# Patient Record
Sex: MALE | Race: BLACK OR AFRICAN AMERICAN | NOT HISPANIC OR LATINO | Employment: UNEMPLOYED | URBAN - METROPOLITAN AREA
[De-identification: names, ages, dates, MRNs, and addresses within clinical notes are randomized per-mention and may not be internally consistent; named-entity substitution may affect disease eponyms.]

---

## 2017-07-28 ENCOUNTER — TRANSFERRED RECORDS (OUTPATIENT)
Dept: HEALTH INFORMATION MANAGEMENT | Facility: CLINIC | Age: 52
End: 2017-07-28

## 2017-08-18 ENCOUNTER — TRANSFERRED RECORDS (OUTPATIENT)
Dept: HEALTH INFORMATION MANAGEMENT | Facility: CLINIC | Age: 52
End: 2017-08-18

## 2018-07-12 ENCOUNTER — TRANSFERRED RECORDS (OUTPATIENT)
Dept: HEALTH INFORMATION MANAGEMENT | Facility: CLINIC | Age: 53
End: 2018-07-12

## 2018-08-14 ENCOUNTER — MEDICAL CORRESPONDENCE (OUTPATIENT)
Dept: HEALTH INFORMATION MANAGEMENT | Facility: CLINIC | Age: 53
End: 2018-08-14

## 2019-02-01 ENCOUNTER — HOSPITAL ENCOUNTER (EMERGENCY)
Facility: CLINIC | Age: 54
Discharge: HOME | End: 2019-02-01
Attending: EMERGENCY MEDICINE | Admitting: EMERGENCY MEDICINE

## 2019-02-01 ENCOUNTER — APPOINTMENT (OUTPATIENT)
Dept: GENERAL RADIOLOGY | Facility: CLINIC | Age: 54
End: 2019-02-01

## 2019-02-01 VITALS
TEMPERATURE: 98.8 F | RESPIRATION RATE: 18 BRPM | OXYGEN SATURATION: 96 % | BODY MASS INDEX: 34.07 KG/M2 | HEIGHT: 69 IN | SYSTOLIC BLOOD PRESSURE: 127 MMHG | DIASTOLIC BLOOD PRESSURE: 78 MMHG | HEART RATE: 85 BPM | WEIGHT: 230 LBS

## 2019-02-01 DIAGNOSIS — R50.9 FEBRILE ILLNESS, ACUTE: ICD-10-CM

## 2019-02-01 DIAGNOSIS — M54.50 BILATERAL LOW BACK PAIN WITHOUT SCIATICA, UNSPECIFIED CHRONICITY: ICD-10-CM

## 2019-02-01 DIAGNOSIS — R51.9 ACUTE NONINTRACTABLE HEADACHE, UNSPECIFIED HEADACHE TYPE: ICD-10-CM

## 2019-02-01 DIAGNOSIS — B34.9 NONSPECIFIC SYNDROME SUGGESTIVE OF VIRAL ILLNESS: ICD-10-CM

## 2019-02-01 LAB
ALBUMIN UR-MCNC: 30 MG/DL
ANION GAP SERPL CALCULATED.3IONS-SCNC: 3 MMOL/L (ref 3–14)
APPEARANCE UR: CLEAR
BASOPHILS # BLD AUTO: 0 10E9/L (ref 0–0.2)
BASOPHILS NFR BLD AUTO: 0.3 %
BILIRUB UR QL STRIP: NEGATIVE
BUN SERPL-MCNC: 9 MG/DL (ref 7–30)
CALCIUM SERPL-MCNC: 8.1 MG/DL (ref 8.5–10.1)
CHLORIDE SERPL-SCNC: 104 MMOL/L (ref 94–109)
CO2 SERPL-SCNC: 29 MMOL/L (ref 20–32)
COLOR UR AUTO: YELLOW
CREAT SERPL-MCNC: 1.08 MG/DL (ref 0.66–1.25)
DIFFERENTIAL METHOD BLD: ABNORMAL
EOSINOPHIL # BLD AUTO: 0 10E9/L (ref 0–0.7)
EOSINOPHIL NFR BLD AUTO: 0 %
ERYTHROCYTE [DISTWIDTH] IN BLOOD BY AUTOMATED COUNT: 11.9 % (ref 10–15)
FLUAV+FLUBV AG SPEC QL: NEGATIVE
FLUAV+FLUBV AG SPEC QL: NEGATIVE
GFR SERPL CREATININE-BSD FRML MDRD: 77 ML/MIN/{1.73_M2}
GLUCOSE SERPL-MCNC: 108 MG/DL (ref 70–99)
GLUCOSE UR STRIP-MCNC: NEGATIVE MG/DL
HCT VFR BLD AUTO: 34.8 % (ref 40–53)
HGB BLD-MCNC: 11.1 G/DL (ref 13.3–17.7)
HGB UR QL STRIP: NEGATIVE
IMM GRANULOCYTES # BLD: 0 10E9/L (ref 0–0.4)
IMM GRANULOCYTES NFR BLD: 0.6 %
KETONES UR STRIP-MCNC: NEGATIVE MG/DL
LACTATE BLD-SCNC: 0.8 MMOL/L (ref 0.7–2)
LEUKOCYTE ESTERASE UR QL STRIP: NEGATIVE
LYMPHOCYTES # BLD AUTO: 0.6 10E9/L (ref 0.8–5.3)
LYMPHOCYTES NFR BLD AUTO: 17.2 %
MCH RBC QN AUTO: 29.1 PG (ref 26.5–33)
MCHC RBC AUTO-ENTMCNC: 31.9 G/DL (ref 31.5–36.5)
MCV RBC AUTO: 91 FL (ref 78–100)
MONOCYTES # BLD AUTO: 0.2 10E9/L (ref 0–1.3)
MONOCYTES NFR BLD AUTO: 6.3 %
MUCOUS THREADS #/AREA URNS LPF: PRESENT /LPF
NEUTROPHILS # BLD AUTO: 2.6 10E9/L (ref 1.6–8.3)
NEUTROPHILS NFR BLD AUTO: 75.6 %
NITRATE UR QL: NEGATIVE
NRBC # BLD AUTO: 0 10*3/UL
NRBC BLD AUTO-RTO: 0 /100
PH UR STRIP: 8 PH (ref 5–7)
PLATELET # BLD AUTO: 201 10E9/L (ref 150–450)
POTASSIUM SERPL-SCNC: 5.3 MMOL/L (ref 3.4–5.3)
RBC # BLD AUTO: 3.82 10E12/L (ref 4.4–5.9)
RBC #/AREA URNS AUTO: 7 /HPF (ref 0–2)
SODIUM SERPL-SCNC: 136 MMOL/L (ref 133–144)
SOURCE: ABNORMAL
SP GR UR STRIP: 1.02 (ref 1–1.03)
SPECIMEN SOURCE: NORMAL
UROBILINOGEN UR STRIP-MCNC: 4 MG/DL (ref 0–2)
WBC # BLD AUTO: 3.5 10E9/L (ref 4–11)
WBC #/AREA URNS AUTO: <1 /HPF (ref 0–5)

## 2019-02-01 PROCEDURE — 83605 ASSAY OF LACTIC ACID: CPT | Performed by: EMERGENCY MEDICINE

## 2019-02-01 PROCEDURE — 71046 X-RAY EXAM CHEST 2 VIEWS: CPT

## 2019-02-01 PROCEDURE — 85025 COMPLETE CBC W/AUTO DIFF WBC: CPT | Performed by: EMERGENCY MEDICINE

## 2019-02-01 PROCEDURE — 87804 INFLUENZA ASSAY W/OPTIC: CPT | Performed by: EMERGENCY MEDICINE

## 2019-02-01 PROCEDURE — 36415 COLL VENOUS BLD VENIPUNCTURE: CPT

## 2019-02-01 PROCEDURE — 87040 BLOOD CULTURE FOR BACTERIA: CPT | Performed by: EMERGENCY MEDICINE

## 2019-02-01 PROCEDURE — 25000128 H RX IP 250 OP 636: Performed by: EMERGENCY MEDICINE

## 2019-02-01 PROCEDURE — 80048 BASIC METABOLIC PNL TOTAL CA: CPT | Performed by: EMERGENCY MEDICINE

## 2019-02-01 PROCEDURE — 96374 THER/PROPH/DIAG INJ IV PUSH: CPT

## 2019-02-01 PROCEDURE — 81001 URINALYSIS AUTO W/SCOPE: CPT | Performed by: EMERGENCY MEDICINE

## 2019-02-01 PROCEDURE — 96375 TX/PRO/DX INJ NEW DRUG ADDON: CPT

## 2019-02-01 PROCEDURE — 96361 HYDRATE IV INFUSION ADD-ON: CPT

## 2019-02-01 PROCEDURE — 25000132 ZZH RX MED GY IP 250 OP 250 PS 637: Performed by: EMERGENCY MEDICINE

## 2019-02-01 PROCEDURE — 99284 EMERGENCY DEPT VISIT MOD MDM: CPT | Mod: 25

## 2019-02-01 RX ORDER — METOCLOPRAMIDE HYDROCHLORIDE 5 MG/ML
5 INJECTION INTRAMUSCULAR; INTRAVENOUS ONCE
Status: COMPLETED | OUTPATIENT
Start: 2019-02-01 | End: 2019-02-01

## 2019-02-01 RX ORDER — ACETAMINOPHEN 500 MG
1000 TABLET ORAL ONCE
Status: COMPLETED | OUTPATIENT
Start: 2019-02-01 | End: 2019-02-01

## 2019-02-01 RX ORDER — ONDANSETRON 4 MG/1
4 TABLET, ORALLY DISINTEGRATING ORAL EVERY 8 HOURS PRN
Qty: 10 TABLET | Refills: 0 | Status: SHIPPED | OUTPATIENT
Start: 2019-02-01 | End: 2019-03-03

## 2019-02-01 RX ORDER — ONDANSETRON 2 MG/ML
4 INJECTION INTRAMUSCULAR; INTRAVENOUS ONCE
Status: COMPLETED | OUTPATIENT
Start: 2019-02-01 | End: 2019-02-01

## 2019-02-01 RX ORDER — TRAMADOL HYDROCHLORIDE 50 MG/1
50 TABLET ORAL EVERY 6 HOURS PRN
COMMUNITY
End: 2021-04-07

## 2019-02-01 RX ORDER — DIPHENHYDRAMINE HYDROCHLORIDE 50 MG/ML
25 INJECTION INTRAMUSCULAR; INTRAVENOUS ONCE
Status: COMPLETED | OUTPATIENT
Start: 2019-02-01 | End: 2019-02-01

## 2019-02-01 RX ORDER — TRAZODONE HYDROCHLORIDE 50 MG/1
300 TABLET, FILM COATED ORAL AT BEDTIME
COMMUNITY
End: 2023-01-09

## 2019-02-01 RX ORDER — LIDOCAINE 4 G/G
1 PATCH TOPICAL ONCE
Status: DISCONTINUED | OUTPATIENT
Start: 2019-02-01 | End: 2019-02-01 | Stop reason: HOSPADM

## 2019-02-01 RX ORDER — KETOROLAC TROMETHAMINE 15 MG/ML
15 INJECTION, SOLUTION INTRAMUSCULAR; INTRAVENOUS ONCE
Status: COMPLETED | OUTPATIENT
Start: 2019-02-01 | End: 2019-02-01

## 2019-02-01 RX ADMIN — DIPHENHYDRAMINE HYDROCHLORIDE 25 MG: 50 INJECTION INTRAMUSCULAR; INTRAVENOUS at 11:29

## 2019-02-01 RX ADMIN — ONDANSETRON 4 MG: 2 INJECTION INTRAMUSCULAR; INTRAVENOUS at 10:05

## 2019-02-01 RX ADMIN — ACETAMINOPHEN 1000 MG: 500 TABLET, FILM COATED ORAL at 10:03

## 2019-02-01 RX ADMIN — SODIUM CHLORIDE 1000 ML: 9 INJECTION, SOLUTION INTRAVENOUS at 10:02

## 2019-02-01 RX ADMIN — KETOROLAC TROMETHAMINE 15 MG: 15 INJECTION, SOLUTION INTRAMUSCULAR; INTRAVENOUS at 10:05

## 2019-02-01 RX ADMIN — SODIUM CHLORIDE 1000 ML: 9 INJECTION, SOLUTION INTRAVENOUS at 11:27

## 2019-02-01 RX ADMIN — METOCLOPRAMIDE 5 MG: 5 INJECTION, SOLUTION INTRAMUSCULAR; INTRAVENOUS at 11:31

## 2019-02-01 RX ADMIN — LIDOCAINE 1 PATCH: 560 PATCH PERCUTANEOUS; TOPICAL; TRANSDERMAL at 10:50

## 2019-02-01 SDOH — HEALTH STABILITY: MENTAL HEALTH: HOW OFTEN DO YOU HAVE A DRINK CONTAINING ALCOHOL?: NEVER

## 2019-02-01 ASSESSMENT — ENCOUNTER SYMPTOMS
NAUSEA: 1
DYSURIA: 0
HEADACHES: 1
COUGH: 1
FEVER: 1
VOMITING: 1
CONSTIPATION: 0
DIARRHEA: 0
HEMATURIA: 0
BACK PAIN: 1

## 2019-02-01 ASSESSMENT — MIFFLIN-ST. JEOR: SCORE: 1873.65

## 2019-02-01 NOTE — DISCHARGE INSTRUCTIONS
Rest and drink lots of fluids.  Use Tylenol or ibuprofen as needed for pain and fever.  Purchase patches over the counter for back pain.  Use Zofran as needed for nausea and vomiting.  Return if you are having severe back pain, numbness or tingling, weakness, difficulty urinating, severe headache or neck pain, or ANY OTHER CONCERNING SYMPTOMS.  Otherwise, follow up with primary care next week to ensure you are improving as expected.

## 2019-02-01 NOTE — ED PROVIDER NOTES
"  History     Chief Complaint:  Back Pain     The history is provided by the patient.     Babar Bhatt is a 53 year old male with a history of hypertension who presents for evaluation of \"a bad cold or something.\" He has had frontal headache, nonproductive cough, now resolved sore throat, anorexia, nausea with a single episode of emesis, and lower abdominal pain that is now resolved over the last 3 days. He has chronic bilateral lower back pain and presents today because this pain became severe in the last 24 hours - a change from baseline. It does not radiate to his legs. He has tried Tylenol and aspirin for pain last night without relief. He has no history of malignancy or IV drug use. Patient denies diarrhea, constipation, dysuria, or hematuria. He does have right sided neck soreness. 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 He did not get an influenza vaccination this year. Patient has fever here, but did not note one at home.    Allergies:  NKDA    Medications:    Tramadol  Trazodone    Past Medical History:    HTN    Past Surgical History:    The patient does not have any pertinent past surgical history.    Family History:    No past pertinent family history.    Social History:  Marital Status:  Single   Friend at bedside.  Tobacco use: Smokes 0.50 PPD  Negative for alcohol use.     Review of Systems   Constitutional: Positive for appetite change and fever.   HENT: Negative for sore throat (resolved).    Respiratory: Positive for cough.    Gastrointestinal: Positive for nausea and vomiting. Negative for abdominal pain (resolved), constipation and diarrhea.   Genitourinary: Negative for dysuria and hematuria.   Musculoskeletal: Positive for back pain and neck pain (right neck sore).   Neurological: Positive for headaches.   All other systems reviewed and are negative.    Physical Exam     Patient Vitals for the past 24 hrs:   BP Temp Temp src Pulse Resp SpO2 " "Height Weight   02/01/19 1200 133/79 98.8  F (37.1  C) Oral 86 -- 96 % -- --   02/01/19 1130 136/82 -- -- -- -- -- -- --   02/01/19 1100 -- 101  F (38.3  C) Oral -- -- -- -- --   02/01/19 0840 140/77 102.6  F (39.2  C) Oral 94 18 97 % 1.753 m (5' 9\") 104.3 kg (230 lb)     Physical Exam  General: Well-developed and well-nourished. Well appearing middle aged  man. Cooperative.  Head:  Atraumatic.  Eyes:  Conjunctivae, lids, and sclerae are normal.  ENT:    Normal nose. Moist mucous membranes. Normal posterior oropharynx.  Neck:  Supple. Normal range of motion. No nuchal rigidity and able to easily touch chin to chest.  CV:  Regular rate and rhythm. Normal heart sounds with no murmurs, rubs, or gallops detected.  Resp:  No respiratory distress. Rare coarse rhonchi left base without decreased breath sounds, wheezing, or rales.  GI:  Soft. Non-distended. Non-tender. No love CVA tenderness.    MS:  Normal ROM. No bilateral lower extremity edema. Mild tenderness bilateral lumbar paraspinal musculature.  Skin:  Warm. Non-diaphoretic. No pallor.  Neuro: Awake. A&Ox3. Normal strength including 5/5 strength with plantarflexion and dorsiflexion. Sensation intact to light touch throughout including perineum.  Psych:  Normal mood and affect. Normal speech.  Vitals reviewed.    Emergency Department Course   Imaging:  Radiographic findings were communicated with the patient who voiced understanding of the findings.  XR Chest 2 views:   PA and lateral views of the chest. Lungs are clear. Heart  is normal in size. No effusions are evident. No pneumothorax.  Degenerative lower thoracic spine changes are noted, as per radiology.     Laboratory:  Influenza A/B: Negative  CBC: WBC: 3.5 (L0, HGB: 11.1 (L), PLT: 201  BMP: Glucose 108 (H), Ca: 8.1 (L0, o/w WNL (Creatinine: 1.08)    1000 Lactic acid: 0.8  UA with Microscopic: pH: 8.0 (H), Albumin: 30 (A), Urobilinogen: 4.0 (H), RBC: 7 (H), Mucous: Present (a), o/w " "WNL    Blood cultures x2: Pending    Interventions:  1002 NS 1L IV  1003 Tylenol, 1000 mg, PO  1005 Toradol, 15 mg, IV injection   Zofran, 4 mg, IV injection  1050 Lidocaine patch, 1 patch, Transdermal  1127 NS 1L IV  1129 Benadryl 25 mg IV injection  1131 Reglan 5mg IV injection    Emergency Department Course:  Nursing notes and vitals reviewed.   (0922) I performed an exam of the patient as documented above.      IV inserted. Medicine administered as documented above. Blood drawn. This was sent to the lab for further testing, results above.     The patient was sent for a chest x-ray while in the emergency department, findings above.      The patient provided a urine sample here in the emergency department. This was sent for laboratory testing, findings above.     (1120) I rechecked the patient and discussed the results of his workup thus far. His fever has resolved and his back pain is much better. His headache is still present. He reports that he has been getting cold-like symptoms at the end of every week for the last several months, however today's was much worse than it has been. His friend remarks he does not sleep well and believes cold symptoms at the end of each week are because of lack of sleep.     (1249) I rechecked the patient. His neck pain has resolved and his headache has lessened. He is tolerating PO and ambulating well in the ED.     Findings and plan explained to the patient. Patient discharged home with instructions regarding supportive care, medications, and reasons to return. The importance of close follow-up was reviewed. The patient was prescribed Zofran.      I personally reviewed the laboratory and imaging results with the patient and answered all related questions prior to discharge.     Impression & Plan      Medical Decision Making:  Babar is a 53 year old male who presents with a \"bad cold.\"  He reports a headache, cough, now resolved sore throat, nausea and vomiting, and ultimately " presented today due to back pain.  Patient states he has a history of back pain, but it was worse today.  He appears well on exam though he is febrile to 102.6  F.  He has some neck pain which he describes as right-sided soreness without nuchal rigidity and my concern for meningitis is very low.  LP is not indicated currently.  He had some abdominal pain earlier that has now resolved and he has no abdominal tenderness and imaging of the abdomen can safely be deferred.  He has no CVA tenderness, describing his back pain is much lower.  Urinalysis reveals no evidence of urinary tract infection or pyelonephritis to explain his symptoms and he has no hematuria to suggest ureterolithiasis or infected stone.  He has excellent strength and sensation of the lower extremities and no saddle anesthesia and my concern for cauda equina is low.  Although epidural abscess is to be considered in this patient, he denies history of IV drug use and a well established history of chronic low back pain (PT visit August 2018 indicates pain for 4 years but I find MRI dating back to 2010).  Likely his fever and acute illness have worsened his chronic low back pain.  I did obtain blood cultures given he is febrile, but my concern for bacteremia is low.  He has no hypotension or evidence of hypoperfusion with a normal lactic acid.  He has reassuring electrolytes and no kidney injury.  Although influenza is negative, he does have a leukopenia to 3.5 which is more suggestive of a viral illness. False negative influenza remains on differential  Chest x-ray was performed which reveals no pneumonia or other acute intrathoracic processes.  Patient was given Tylenol and Toradol with resolution of his fever.  He was given Lidoderm patch and noted slow improvement in his back pain.  On repeat evaluation, he continued to complain of headache and was given a migraine cocktail of Reglan and Benadryl as well as a second liter of fluids and after this had  notable improvement in his symptoms.  He has had resolution of his neck pain and was able to tolerate PO.  He was able to ambulate without significant back pain and overall states he is feeling improved and comfortable with plan for discharge.  At this point, I believe patient is appropriate for discharge given he has a reassuring workup and improvement in his symptoms.  Again, my suspicion is that the patient's fever and symptoms are related to a viral syndrome and will likely improve with supportive care including rest, fluids, and continued over-the-counter analgesics/antipyretics.  I recommended he purchase Lidoderm type patches over-the-counter as he states he does not have insurance and cannot afford Lidoderm patches.  I have provided him with Zofran as needed for nausea and vomiting.  We have discussed possible other causes of his fever, including meningitis, and he has verbalized understanding of a very low threshold for return should he have worsening or any new/concerning symptoms.  Otherwise I recommend he follow-up with primary care.  All his questions were answered and he verbalized understanding and is amenable to discharge.    Critical Care time:  none    Diagnosis:    ICD-10-CM    1. Febrile illness, acute R50.9    2. Nonspecific syndrome suggestive of viral illness B34.9    3. Bilateral low back pain without sciatica, unspecified chronicity M54.5    4. Acute nonintractable headache, unspecified headache type R51        Disposition:  discharged home    Discharge Medications:     Medication List      Started    ondansetron 4 MG ODT tab  Commonly known as:  ZOFRAN ODT  4 mg, Oral, EVERY 8 HOURS PRN          Scribe Disclosure:  I, Rocio Arnett, am serving as a scribe on 2/1/2019 at 9:22 AM to personally document services performed by Roz Bone MD based on my observations and the provider's statements to me.     Rocio Arnett  2/1/2019    EMERGENCY DEPARTMENT       Roz Bone MD  02/04/19  [id number]

## 2019-02-01 NOTE — ED AVS SNAPSHOT
Emergency Department  [address] [place] [id number]  Phone:  [phone]  Fax:  [phone]                                    Babar Bhatt   MRN: [id number]    Department:   Emergency Department   Date of Visit:  2/1/2019           After Visit Summary Signature Page    I have received my discharge instructions, and my questions have been answered. I have discussed any challenges I see with this plan with the nurse or doctor.    ..........................................................................................................................................  Patient/Patient Representative Signature      ..........................................................................................................................................  Patient Representative Print Name and Relationship to Patient    ..................................................               ................................................  Date                                   Time    ..........................................................................................................................................  Reviewed by Signature/Title    ...................................................              ..............................................  Date                                               Time          22EPIC Rev 08/18

## 2019-02-04 ASSESSMENT — ENCOUNTER SYMPTOMS
SORE THROAT: 0
APPETITE CHANGE: 1
ABDOMINAL PAIN: 0
NECK PAIN: 1

## 2019-02-07 LAB
BACTERIA SPEC CULT: NO GROWTH
BACTERIA SPEC CULT: NO GROWTH
Lab: NORMAL
Lab: NORMAL
SPECIMEN SOURCE: NORMAL
SPECIMEN SOURCE: NORMAL

## 2019-04-13 ENCOUNTER — HOSPITAL ENCOUNTER (EMERGENCY)
Facility: CLINIC | Age: 54
Discharge: HOME | End: 2019-04-13
Attending: EMERGENCY MEDICINE | Admitting: EMERGENCY MEDICINE
Payer: COMMERCIAL

## 2019-04-13 VITALS
HEART RATE: 71 BPM | SYSTOLIC BLOOD PRESSURE: 160 MMHG | HEIGHT: 69 IN | DIASTOLIC BLOOD PRESSURE: 104 MMHG | WEIGHT: 225 LBS | TEMPERATURE: 98.5 F | RESPIRATION RATE: 13 BRPM | BODY MASS INDEX: 33.33 KG/M2 | OXYGEN SATURATION: 97 %

## 2019-04-13 DIAGNOSIS — I10 BENIGN ESSENTIAL HYPERTENSION: ICD-10-CM

## 2019-04-13 LAB
ANION GAP SERPL CALCULATED.3IONS-SCNC: 4 MMOL/L (ref 3–14)
BASOPHILS # BLD AUTO: 0 10E9/L (ref 0–0.2)
BASOPHILS NFR BLD AUTO: 0.2 %
BUN SERPL-MCNC: 12 MG/DL (ref 7–30)
CALCIUM SERPL-MCNC: 8.8 MG/DL (ref 8.5–10.1)
CHLORIDE SERPL-SCNC: 108 MMOL/L (ref 94–109)
CO2 SERPL-SCNC: 28 MMOL/L (ref 20–32)
CREAT SERPL-MCNC: 0.86 MG/DL (ref 0.66–1.25)
DIFFERENTIAL METHOD BLD: ABNORMAL
EOSINOPHIL # BLD AUTO: 0.3 10E9/L (ref 0–0.7)
EOSINOPHIL NFR BLD AUTO: 2.8 %
ERYTHROCYTE [DISTWIDTH] IN BLOOD BY AUTOMATED COUNT: 12.4 % (ref 10–15)
GFR SERPL CREATININE-BSD FRML MDRD: >90 ML/MIN/{1.73_M2}
GLUCOSE SERPL-MCNC: 97 MG/DL (ref 70–99)
HCT VFR BLD AUTO: 38.8 % (ref 40–53)
HGB BLD-MCNC: 12.7 G/DL (ref 13.3–17.7)
IMM GRANULOCYTES # BLD: 0 10E9/L (ref 0–0.4)
IMM GRANULOCYTES NFR BLD: 0.3 %
LYMPHOCYTES # BLD AUTO: 1.3 10E9/L (ref 0.8–5.3)
LYMPHOCYTES NFR BLD AUTO: 14.2 %
MCH RBC QN AUTO: 29.3 PG (ref 26.5–33)
MCHC RBC AUTO-ENTMCNC: 32.7 G/DL (ref 31.5–36.5)
MCV RBC AUTO: 89 FL (ref 78–100)
MONOCYTES # BLD AUTO: 0.9 10E9/L (ref 0–1.3)
MONOCYTES NFR BLD AUTO: 10.5 %
NEUTROPHILS # BLD AUTO: 6.4 10E9/L (ref 1.6–8.3)
NEUTROPHILS NFR BLD AUTO: 72 %
NRBC # BLD AUTO: 0 10*3/UL
NRBC BLD AUTO-RTO: 0 /100
NT-PROBNP SERPL-MCNC: 74 PG/ML (ref 0–900)
PLATELET # BLD AUTO: 266 10E9/L (ref 150–450)
POTASSIUM SERPL-SCNC: 4 MMOL/L (ref 3.4–5.3)
RBC # BLD AUTO: 4.34 10E12/L (ref 4.4–5.9)
SODIUM SERPL-SCNC: 140 MMOL/L (ref 133–144)
TROPONIN I SERPL-MCNC: <0.015 UG/L (ref 0–0.04)
WBC # BLD AUTO: 8.9 10E9/L (ref 4–11)

## 2019-04-13 PROCEDURE — 85025 COMPLETE CBC W/AUTO DIFF WBC: CPT | Performed by: EMERGENCY MEDICINE

## 2019-04-13 PROCEDURE — 99284 EMERGENCY DEPT VISIT MOD MDM: CPT

## 2019-04-13 PROCEDURE — 80048 BASIC METABOLIC PNL TOTAL CA: CPT | Performed by: EMERGENCY MEDICINE

## 2019-04-13 PROCEDURE — 84484 ASSAY OF TROPONIN QUANT: CPT | Performed by: EMERGENCY MEDICINE

## 2019-04-13 PROCEDURE — 93005 ELECTROCARDIOGRAM TRACING: CPT

## 2019-04-13 PROCEDURE — 83880 ASSAY OF NATRIURETIC PEPTIDE: CPT | Performed by: EMERGENCY MEDICINE

## 2019-04-13 RX ORDER — AMLODIPINE BESYLATE 5 MG/1
5 TABLET ORAL DAILY
Qty: 30 TABLET | Refills: 0 | Status: SHIPPED | OUTPATIENT
Start: 2019-04-13 | End: 2021-04-07

## 2019-04-13 ASSESSMENT — ENCOUNTER SYMPTOMS: HEADACHES: 1

## 2019-04-13 ASSESSMENT — MIFFLIN-ST. JEOR: SCORE: 1850.97

## 2019-04-13 NOTE — ED AVS SNAPSHOT
Emergency Department  [address] [place] [address]  Phone:  [phone]  Fax:  [phone]                                    Babar Bhatt   MRN: [id number]    Department:   Emergency Department   Date of Visit:  4/13/2019           After Visit Summary Signature Page    I have received my discharge instructions, and my questions have been answered. I have discussed any challenges I see with this plan with the nurse or doctor.    ..........................................................................................................................................  Patient/Patient Representative Signature      ..........................................................................................................................................  Patient Representative Print Name and Relationship to Patient    ..................................................               ................................................  Date                                   Time    ..........................................................................................................................................  Reviewed by Signature/Title    ...................................................              ..............................................  Date                                               Time          22EPIC Rev 08/18

## 2019-04-13 NOTE — DISCHARGE INSTRUCTIONS
*You may resume diet and activities.  *Take medications as prescribed.  Continue your current medications.  *Check your blood pressures daily and follow-up with your doctor regarding them.  *Return if you develop chest pain, shortness of breath, faint or feel like you will faint or become worse in any way.    Discharge Instructions  Hypertension - High Blood Pressure    During you visit to the Emergency Department, your blood pressure was higher than the recommended blood pressure.  This may be related to stress, pain, medication or other temporary conditions. In these cases, your blood pressure may return to normal on its own. If you have a history of high blood pressure, you may need to have your provider adjust your medications. Sometimes, your high measurement here may indicate that you have developed high blood pressure that will stay high unless it is treated. As a general rule, high blood pressure causes problems over years rather than days, weeks, or months. So, while it is important to treat blood pressure, it is rarely important to treat blood pressure immediately. Occasionally we will begin a medication in the Emergency Department; more often we will recommend close follow-up for medications with a primary doctor/clinic.    Generally, every Emergency Department visit should have a follow-up clinic visit with either a primary or a specialty clinic/provider. Please follow-up as instructed by your emergency provider today.    Return to the Emergency Department if you start to have:  A severe headache.  Chest pain.  Shortness of breath.  Weakness or numbness that affects one part of the body.  Confusion.  Vision changes.  Significant swelling of legs and/or eyes.  A reaction to any medication started in the Emergency Department.    What can I do to help myself?  Avoid alcohol.  Take any blood pressure medicine that you are prescribed.  Get a good night?s sleep.  Lower your salt intake.  Exercise.  Lose  weight.  Manage stress.  See your doctor regularly    If blood pressure medication was started in the Emergency Department:  The medicine may not have an immediate effect. The body and brain determine what blood pressure you have. The medicine?s job is to retrain the body?s ?thermostat? to a lower blood pressure.  You will need to follow up with your provider to see how this medicine is working for you.  If you were given a prescription for medicine here today, be sure to read all of the information (including the package insert) that comes with your prescription.  This will include important information about the medicine, its side effects, and any warnings that you need to know about.  The pharmacist who fills the prescription can provide more information and answer questions you may have about the medicine.  If you have questions or concerns that the pharmacist cannot address, please call or return to the Emergency Department.   Remember that you can always come back to the Emergency Department if you are not able to see your regular provider in the amount of time listed above, if you get any new symptoms, or if there is anything that worries you.

## 2019-04-13 NOTE — ED PROVIDER NOTES
History     Chief Complaint:  Hypertension    The history is provided by the patient.      Babar Bhatt is a 53 year old male who presents with hypertension. The patient reports having high blood pressure for a while and he has not been getting treated for it for a couple of months. He states the medication did not work which is why he quit taking it. He reports having associated headaches and blurry vision for several months now which is why he has been taking his blood pressure.  He was seen yesterday in urgent care and they referred him to the ED. Headaches are gradual and like previous headaches.  No associated neurologic symptoms.  No fever.  No chest pain, shortness of breath, leg swelling or other symptoms.  He does not know the name of the medication. He denies a history of diabetes.     Allergies:  No known drug allergies.    Medications:    Tramadol (Ultram) 50mg  Trazodone (Desyrel) 50mg    Past Medical History:    Hypertension    Past Surgical History:    History reviewed. No pertinent past surgical history.    Family History:    History reviewed. No pertinent family history.    Social History:  Patient is single  Tobacco Use: Current smoker  Alcohol Use: No  PCP: Physician No Ref-Primary     Review of Systems   Eyes: Positive for visual disturbance.   Cardiovascular:        Hypertension   Neurological: Positive for headaches.   All other systems reviewed and are negative.    Physical Exam   First Vitals:  Patient Vitals for the past 24 hrs:   BP Temp Temp src Pulse Heart Rate Resp SpO2 Height Weight   04/13/19 1630 (!) 152/106 -- -- 79 75 15 96 % -- --   04/13/19 1621 -- -- -- -- 75 10 96 % -- --   04/13/19 1620 -- -- -- -- 77 10 91 % -- --   04/13/19 1619 (!) 163/109 -- -- 74 77 -- 98 % -- --   04/13/19 1617 -- -- -- -- 76 20 -- -- --   04/13/19 1616 -- -- -- -- 85 18 -- -- --   04/13/19 1615 -- -- -- -- 75 13 -- -- --   04/13/19 1614 -- -- -- -- 76 19 -- -- --   04/13/19 1613 -- -- -- -- 70 16 --  "-- --   04/13/19 1612 -- -- -- -- 77 26 -- -- --   04/13/19 1611 -- -- -- -- 84 16 -- -- --   04/13/19 1610 -- -- -- -- 80 10 -- -- --   04/13/19 1609 -- -- -- -- 76 15 -- -- --   04/13/19 1608 -- -- -- -- 70 9 -- -- --   04/13/19 1607 -- -- -- -- 69 12 -- -- --   04/13/19 1606 -- -- -- -- 73 17 -- -- --   04/13/19 1605 -- -- -- -- 72 11 -- -- --   04/13/19 1604 -- -- -- -- 70 22 -- -- --   04/13/19 1603 -- -- -- -- 68 10 -- -- --   04/13/19 1602 -- -- -- -- -- (!) 48 -- -- --   04/13/19 1530 (!) 208/87 98.5  F (36.9  C) Oral 81 -- 18 98 % 1.753 m (5' 9\") 102.1 kg (225 lb)     Physical Exam  General: Well-nourished,  appears to be uncomfortable when I enter the room  Eyes: PERRL, conjunctivae pink no scleral icterus or conjunctival injection.  No photophobia  ENT:  Moist mucus membranes, posterior oropharynx clear without erythema or exudates  Respiratory:  Lungs clear to auscultation bilaterally, no crackles/rubs/wheezes.  Good air movement  CV: Normal rate and rhythm, no murmurs/rubs/gallops  GI:  Abdomen soft and non-distended.  Normoactive BS.  No tenderness, guarding or rebound  Skin: Warm, dry.  No rashes or petechiae  Musculoskeletal: No peripheral edema or calf tenderness  Neuro: Alert and oriented to person/place/time.  Neck supple.  PERRL, EOMI no nystagmus, no aphasia/facial droop/dysarthria, tongue midline, symmetric palatal elevation, normal strength at SCM/trapezius/BUE/BLE, normal coordination to FNF at BUE, normal casual gait, negative romberg, sensation intact to LT over face/BUE/BLE  Psychiatric: Normal affect    Emergency Department Course   ECG:  @ 1559  Indication: Hypertension  Vent. Rate 74 bpm. IN interval 144 ms. QRS duration 82 ms. QT/QTc 350/388 ms. P-R-T axis 58 -22 -31.   Normal sinus rhythm. Normal ECG.     Read @ 1603 by Dr. Caballero.    Laboratory:  CBC:  WBC 8.9, HGB 12.7 low,   BMP: WNL. (Creatinine 0.86)  Troponin: <0.015  BNP: 74    Emergency Department Course:  3:48 PM Nursing " notes and vitals reviewed.  I performed an exam of the patient as documented above.     5:12 PM I rechecked on and updated the patient.    5:17 PM Findings and plan explained to the patient. Patient discharged home with instructions regarding supportive care, medications, and reasons to return. The importance of close follow-up was reviewed.     Impression & Plan      Medical Decision Making:  Babar Bhatt is a 53 year old male who presents for evaluation of elevated blood pressure.  There is a history of hypertension in the past.  The workup here is negative and the patient does not have any clinical, laboratory, ecg or historical signs of end-organ dysfunction.  His headaches are chronic and I do not believe he has a subarachnoid or meningitis. There is no signs of hypertensive emergency or urgency.  Blood pressure came down without intervention.  Supportive outpatient management is therefore indicated with close follow-up of primary care physician.  Given data obtained here in ED, will initiate amlodipine for therapy at this time given he is  and encouraged serial blood pressure monitoring at home to aid primary in decision making regarding hypertension.      Diagnosis:    ICD-10-CM    1. Benign essential hypertension I10        Disposition:  discharged to home    Discharge Medications:     Medication List      Started    amLODIPine 5 MG tablet  Commonly known as:  NORVASC  5 mg, Oral, DAILY          I, Bradley Aasen, am serving as a scribe on 4/13/2019 at 3:47 PM to personally document services performed by Ange Caballero MD based on my observations and the provider's statements to me.        Ange Caballero MD  04/13/19 1255

## 2019-04-13 NOTE — ED TRIAGE NOTES
Has had hx of htn, used to take meds but quite. Has noticed elevated bp for several months but yesterday went to urgent care where bp was 170/111. C/o headache and blurred vision.

## 2019-04-14 LAB — INTERPRETATION ECG - MUSE: NORMAL

## 2019-05-02 ENCOUNTER — TRANSFERRED RECORDS (OUTPATIENT)
Dept: HEALTH INFORMATION MANAGEMENT | Facility: CLINIC | Age: 54
End: 2019-05-02

## 2019-10-08 ENCOUNTER — OFFICE VISIT (OUTPATIENT)
Dept: FAMILY MEDICINE | Facility: CLINIC | Age: 54
End: 2019-10-08
Payer: COMMERCIAL

## 2019-10-08 VITALS
DIASTOLIC BLOOD PRESSURE: 91 MMHG | RESPIRATION RATE: 16 BRPM | TEMPERATURE: 98.4 F | OXYGEN SATURATION: 95 % | WEIGHT: 233.4 LBS | SYSTOLIC BLOOD PRESSURE: 148 MMHG | HEIGHT: 70 IN | BODY MASS INDEX: 33.41 KG/M2 | HEART RATE: 83 BPM

## 2019-10-08 DIAGNOSIS — I10 BENIGN ESSENTIAL HYPERTENSION: ICD-10-CM

## 2019-10-08 DIAGNOSIS — G89.4 CHRONIC PAIN SYNDROME: Primary | ICD-10-CM

## 2019-10-08 LAB
AMPHETAMINES QUAL: NEGATIVE
BARBITURATES QUAL URINE: NEGATIVE
BENZODIAZEPINE QUAL URINE: NEGATIVE
BUPRENORPHINE QUAL URINE: NEGATIVE
CANNABINOIDS UR QL SCN: POSITIVE
COCAINE QUAL URINE: NEGATIVE
METHAMPHETAMINE: NEGATIVE
METHODONE QUAL: NEGATIVE
MORPHINE QUAL: NEGATIVE
OXYCODONE QUAL: NEGATIVE
PHENCYCLIDINE: NEGATIVE
PROPOXYPHENE: NEGATIVE
TEMPERATURE OF URINE WAS BETWEEN 90-100 DEGREES F: YES
TRICYCLIC ANTIDEPRESSANTS: NEGATIVE

## 2019-10-08 RX ORDER — AMLODIPINE BESYLATE 10 MG/1
10 TABLET ORAL DAILY
Qty: 30 TABLET | Refills: 0 | Status: SHIPPED | OUTPATIENT
Start: 2019-10-08 | End: 2019-10-31

## 2019-10-08 RX ORDER — TRAMADOL HYDROCHLORIDE 50 MG/1
100 TABLET ORAL 4 TIMES DAILY
Qty: 120 TABLET | Refills: 0 | Status: SHIPPED | OUTPATIENT
Start: 2019-10-08 | End: 2019-10-22

## 2019-10-08 ASSESSMENT — PAIN SCALES - GENERAL: PAINLEVEL: SEVERE PAIN (7)

## 2019-10-08 ASSESSMENT — MIFFLIN-ST. JEOR: SCORE: 1904.95

## 2019-10-08 NOTE — PROGRESS NOTES
Chronic Pain Initial Visit         Babar Bhatt is a 53 year old year old who is  here for evaluation and treatment of chronic pain.     Babar is here for evaluation and to develop a multifaceted chronic pain plan that may or may not include chronic opiate therapy.     Previously been on a pain contract? No   Previous pain diagnosis: Yes - chronic low back pain  Pain location: Low back  Pain onset: 3 years ago  Pain is described as Aching, Burning, Sharp, Shooting and Stabbing   Pain rating: intensity ranges from 7/10 to 10/10, and Averages 7/10 on a 0-10 scale.  Aggravating factors include: Standing for periods of time.   Relieving factors include: Sitting, bending over, Details: Sitting/bending over relieves the pain for about 15-20 mins then it returns     Who lives in your household? Step sone, room mate   Recent family or social stressors:  none noted  Are you currently working ? Yes. 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 Fair   How many hours do you need? 8 How many do you get? 8    Mental Health  Diagnosis of Depression :   YES, diagnosed 10 years ago after father passing away   Other MH Diagnosis?:  No   History of preadolescence sexual abuse?:No      Substance Use   Alcohol Use:7-9 beverages / week  Tobacco Use: Currently quitting smoking. Used to smoke about 1/2 ppd.   History of chemical dependency:   YES, hx of cocaine abuse. Went through treatment about 3.5 years ago.   Current use of recreational substances alcohol  and cannabis   Any substance abuse in household? No           Family history:Substance use  Family History of alcohol abuse? No   Family History of Illicit substance use? No   Family History of prescription medication  abuse? No     Past pain Treatments   Pain Clinic:   YES - [facility]   Physical Therapy:  Yes. Helped during PT but then was worse off afterwards.   Psychologist:  Yes at [facility], last saw them 2  years ago. Sees a psychiatrist every 3 months.   Relaxation techniques/biofeedback:  Yes - Plays drums  Chiropractor:  No  Acupuncture:  No  TENs Unit:  Yes - tried for sciatica  Injections:  No   Current Exercise: Activity not currently     Previous medication treatments:  Opiates - Tramadol 100 mg QID  Antiepileptics - gabapentin (Neurontin) (did not like, to sedative)  Antidepressants - Citalopram (Celexa)   NSAIDs - diclofenac (Cataflam, Voltaren) and ibuprofen (Motrin)  Muscle relaxants - none  Topicals - lidocaine (Lidoderm)    LEGAL ISSUES  Are you currently involved in litigation related to your pain complaint? No  Have you ever been arrested or had other legal problems? Yes: no arrests   Have you filed a Workers' Compensation/SSI/MVA claim related to your pain complaint? No    --FITO/CareEverywhere signed for other providers,  Yes  --[place] Prescriber s Database reviewed:Yes    What are you hoping to do when your pain is more controlled? More exercise and being more active            Opioid Evaluation tools     DIRE Score: Patient Selection for Opioid Analgesia      Diagnosis: 2    1 = Benign chronic condition with minimal objective findings or no definite medical diagnosis.  Examples: fibromyalgia, migraine headaches, non-specific back pain.  2 = Slowly progressive condition concordant with moderate pain, or fixed condition with  moderate objective findings. Examples: failed back surgery syndrome, back pain with  moderate degenerative changes, neuropathic pain.  3 = Advanced condition concordant with severe pain with objective findings. Examples:  severe ischemic vascular disease, advanced neuropathy, severe spinal stenosis.    Intractability: 3    1 = Few therapies have been tried and the patient takes a passive role in his/her pain  management process.  2 = Most customary treatments have been tried but the patient is not fully engaged in the pain  management process, or barriers prevent (insurance,  transportation, medical illness).  3 = Patient fully engaged in a spectrum of appropriate treatments but with inadequate  response.    Risk (P+C+R+S): 10    Psychological: 2    1 = Serious personality dysfunction or mental illness interfering with care. Example: personality disorder, severe affective disorder, significant personality issues.  2 = Personality or mental health interferes moderately. Example: depression or anxiety disorder.  3 = Good communication with clinic. No significant personality dysfunction or mental illness.    Chemical Health: 2    1 = Active or very recent use of illicit drugs, excessive alcohol, or prescription drug abuse.  2 = Chemical coper (uses medications to cope with stress) or history of CD in remission.  3 = No CD history. Not drug-focused or chemically reliant.    Reliability: 3  1 = History of numerous problems: medication misuse, missed appointments, rarely follows through.  2 = Occasional difficulties with compliance, but generally reliable.  3 = Highly reliable patient with meds, appointments & treatment.    Social Support: 3  1 = Life in chaos. Little family support and few close relationships. Loss of most normal life roles.  2 = Reduction in some relationships and life roles.  3 = Supportive family/close relationships. Involved in work or school and no social isolation.     Efficacy score: 3    1 = Poor function or minimal pain relief despite moderate to high doses.  2 = Moderate benefit with function improved in a number of ways (or insufficient info - hasn't tried opioid yet or very low doses or too short of a trial).  3 = Good improvement in pain and function and quality of life with stable doses over time.      Total score (D + I + R + E): 18    Score 7-13: Not a suitable candidate for long-term opioid analgesia  Score 14-21: May be a good candidate for long-term opioid analgesia  Used with permission by Duarte Denise M.D.  Date: Oct 8, 2019  Patient Name: Babar LIU  Nova    OPIOID RISK TOOL        Ugo each box that applies Item score if female Item score if male   1. Family history of substance abuse Alcohol 0 1 3    Illegal Drugs 0 2 3    Prescription Drugs 0 4 4   2. Personal History of Substance Abuse Alcohol 0 3 3    Illegal Drugs 4 4 4    Prescription Drugs 1 5 5   3. Age (Ugo box if 16 - 45)  0 1 1   4. History of Preadolescent Sexual Abuse  0 3 0   5. Psychological Disease ADD,  OCD,  Bipolar,  Schizophrenia 0 2 2    Depression 1 1 1     TOTAL:  6     Used by Permission: Shayy Fraire MD   Functional Assessment  Questionnaire -5    Self-care ability assessment (bathing, toilet, dressing, moving and eating)  4. Independent with self-care   20   Family and social ability assessment (chores, hobbies, driving, sex and social activities)  4. Able to perform all   20   Movement ability assessment (Walk climb stairs)  3. Able to walk short distances and climb more than one flight of stairs   15   Lifting ability assessment  1. Able to lift up to 10 lbs. occasionally   5   Work ability assessment  2. Able to work part-time and with physical limitations   10                                                                                                                                                                                                                                                                                               Total    70   Physical Functional Ability (FAQ5) Score    2005 Peter Posada MD.                                                                                                                                                                                                                  Total  X 5 =     70/100       DIRE Score:  No flowsheet data found.   Score 14-21: May be a candidate for long-term opioid analgesia    Source: [facility]   2005    Opioid Risk Tool Score:  No flowsheet data  found.   Total Score Risk Category  4 - 7 =  Moderate Risk   of future problems with Opioid     Functional Assessment  Questionnaire -5  No flowsheet data found.    Bedside opioid-overdose calculator (RIOSORD)    In the past six months, has your patient had an outpatient,          Points for  inpatient or ED visit for any of the following:           'yes' answer    Substance use disorder (addiction) of any kind,     25   including alcohol and cannabis?  Bipolar disorder or schizophrenia?       10  Stroke or other cerebrovascular disease?          9  Signifi cant chronic kidney disease?         8  Heart failure?           7  Nonmalignant pancreatic disease?         7  Chronic pulmonary disease?          5  Chronic headache?           5  Does your patient take:  Fentanyl (transdermal or transmucosal)?      13  Morphine?          11  Methadone?          10  Hydromorphone?           7  Extended-release or long-acting (ER/LA) opioid?          5  Benzodiazepine?           9  Antidepressant?           8   >100 mg MME/day?                      7  TOTAL   8    Average probability of overdose or serious opioid-induced respiratory depression in the next six months   Points    Risk   0-4     2%   5-7     5%   8-9     7%   10-17     15%   18-25     30%   26-41     55%   42     83%        Problem, Medication and Allergy Lists were reviewed and are current..    Patient is a new patient to this clinic and so  I reviewed/updated the Past Medical History, the Family History and the Social History. .         Review of Systems:   CONSTITUTIONAL: no fatigue, no unexpected change in weight  SKIN: no worrisome rashes, no worrisome moles, no worrisome lesions  EYES: no acute vision problems or changes  ENT: no ear problems, no mouth problems, no throat problems  RESP: no significant cough, no shortness of breath  CV: no chest pain, no palpitations, no new or worsening peripheral edema  GI: no nausea, no vomiting, no constipation, no  "diarrhea         Physical Exam:     Vitals:    10/08/19 1437 10/08/19 1439   BP: (!) 160/96 (!) 148/91   Pulse: 83    Resp: 16    Temp: 98.4  F (36.9  C)    TempSrc: Oral    SpO2: 95%    Weight: 105.9 kg (233 lb 6.4 oz)    Height: 1.778 m (5' 10\")      Body mass index is 33.49 kg/m .  Severe Pain (7)  Vital signs normal except elevated BP  Vitals were reviewed  GENERAL: healthy, alert, well nourished, well hydrated, no distress  RESP: lungs clear to auscultation - no rales, no rhonchi, no wheezes  CV: regular rates and rhythm, normal S1 S2, no S3 or S4 and no murmur, no click or rub -        Results:     Results for orders placed or performed during the hospital encounter of 04/13/19   CBC with platelets differential   Result Value Ref Range    WBC 8.9 4.0 - 11.0 10e9/L    RBC Count 4.34 (L) 4.4 - 5.9 10e12/L    Hemoglobin 12.7 (L) 13.3 - 17.7 g/dL    Hematocrit 38.8 (L) 40.0 - 53.0 %    MCV 89 78 - 100 fl    MCH 29.3 26.5 - 33.0 pg    MCHC 32.7 31.5 - 36.5 g/dL    RDW 12.4 10.0 - 15.0 %    Platelet Count 266 150 - 450 10e9/L    Diff Method Automated Method     % Neutrophils 72.0 %    % Lymphocytes 14.2 %    % Monocytes 10.5 %    % Eosinophils 2.8 %    % Basophils 0.2 %    % Immature Granulocytes 0.3 %    Nucleated RBCs 0 0 /100    Absolute Neutrophil 6.4 1.6 - 8.3 10e9/L    Absolute Lymphocytes 1.3 0.8 - 5.3 10e9/L    Absolute Monocytes 0.9 0.0 - 1.3 10e9/L    Absolute Eosinophils 0.3 0.0 - 0.7 10e9/L    Absolute Basophils 0.0 0.0 - 0.2 10e9/L    Abs Immature Granulocytes 0.0 0 - 0.4 10e9/L    Absolute Nucleated RBC 0.0    Basic metabolic panel   Result Value Ref Range    Sodium 140 133 - 144 mmol/L    Potassium 4.0 3.4 - 5.3 mmol/L    Chloride 108 94 - 109 mmol/L    Carbon Dioxide 28 20 - 32 mmol/L    Anion Gap 4 3 - 14 mmol/L    Glucose 97 70 - 99 mg/dL    Urea Nitrogen 12 7 - 30 mg/dL    Creatinine 0.86 0.66 - 1.25 mg/dL    GFR Estimate >90 >60 mL/min/[1.73_m2]    GFR Estimate If Black >90 >60 mL/min/[1.73_m2] "    Calcium 8.8 8.5 - 10.1 mg/dL   Troponin I   Result Value Ref Range    Troponin I ES <0.015 0.000 - 0.045 ug/L   Nt probnp inpatient (BNP)   Result Value Ref Range    N-Terminal Pro BNP Inpatient 74 0 - 900 pg/mL   EKG 12-lead, tracing only   Result Value Ref Range    Interpretation ECG Click View Image link to view waveform and result         -Comprehensive rapid urine drug screen obtained today: Yes    Assessment and Plan    Babar Bhatt is here for evaluation of chronic pain.    PMHx: Depressing, hypertension   PSHx: None   Meds: Celexa, trazodone, amlodipine 5mg daily   Allergies: None  FamHx: No family history of Heart disease, stroke. Sister and father have diabetes      Babar Bhatt presents to establish care. Past medical, surgical, family, and social history reviewed.         Pt currently has a functional status FAQ 5  of 70.    Patient is being prescribed 400 mg of tramadol per day. 40 mg MEDD    Naloxone WILL NOT be prescribed.     The etiology of patient's chronic pain is chronic low back pain M.54.5    Chronic pain syndrome  Babar presents for initial CPM visit. He has been seeing Dr. Bowles from [facility] and has been on a stable does of tramadol for quite some time due to low back pain. He has been off of tramadol for 2 weeks and his LBP worsened. No withdrawal symptoms. He was honest through our discussions and seems that the tramadol has helped his pain more than any other medication or therapy he has tried. He also states it helps his depression. He does have a history of cocaine abuse which he underwent treatment for 3.5 years ago and has been free of cocaine since. He admits to smoking marijuana occasionally which is consistent with his UDS. Will continue his current tramadol prescription and see him back in 2 weeks for follow up.   - Rapid Urine Drug Screen ([facility])  - MENTAL HEALTH REFERRAL  -  - traMADol (ULTRAM) 50 MG tablet; Take 2 tablets (100 mg) by mouth 4 times daily for 15 days   Dispense: 120 tablet; Refill: 0    2. Benign essential hypertension  Hypertensive today, will increase amlodipine 10 mg daily (was on 5 mg daily)  - amLODIPine (NORVASC) 10 MG tablet; Take 1 tablet (10 mg) by mouth daily  Dispense: 30 tablet; Refill: 0    Exercise discussed and patient  and he declined to increase exercise at this time due to pain, however, would like to exercise more in the future if pain is controlled.     I explained that the assessment process may take up to 3 visits.   Expectations for CPM were also copied into the patient instructions.    Goals of therapy:     Increase function     Decrease suffering     May not relieve pain  1) Non-medical management: He has tried PT in the past and is not interested in it currently. He would like to increase exercise once he is back on tramadol and his pain is improved.   2) Non-opioid, medical management: None  3) Opioid medical management: tramadol 400 mg QID  4) Behavioral Health referral completed for CPM BH evaluation.      5) If opioids prescribed  patient was asked to bring pill bottle to each appointment and was informed that refills would only be provided at office visits. Sign opioid treatment agreement, update yearly while on opioids.   6) Asked patient to follow-up in  2 weeks with same provider for   CPM Follow up (20min)visit.  7)Chronic pain syndrome added to the patient s problem list      I precepted today with Eric Gutierrez MD.     Rafi Ortega, PGY3  [facility] Medicine

## 2019-10-08 NOTE — PATIENT INSTRUCTIONS
Babar Bhatt  [id number]         October 8, 2019  Informed  Consent  and  Agreement  for  Opioid  Therapy  of  Pain        Reason  for  Review  and  Signing  of  this  Document       Pain  relief  is  an  important  goal  for  your  care.  Opioid  medications  may  be  a  helpful  part  of   chronic  pain  treatment  for  some  people;  however,  misuse  of  opioid  medications  may result  in   serious  harm  to  patients  prescribed  them  and,  when  the  medications  are  diverted,  to  the  public  at   large.  As  opioid  use  for  pain  management  has  increased  in  recent  years,  injury,  addiction,  and   death  due  to  misuse  of  opioids  have  also  increased.    Patients  and  health  care  providers  both  have  responsibilities  for  the  safe  use  of  opioid   medications  when  they  are  prescribed  for  pain.  This  agreement  provides  important information   on  the  potential  benefits  and  risks  of  opioid  medications  and shows that both  you and  your  provider  agree  on  a  care  plan  so  that  opioid  medications  are  used  in  a  way  that  is  safe   and  effective  in  treating  your  pain.    This  agreement  is  reviewed  and  signed  by  all  patients  in  our   practice  who  receive  opioids  for  chronic  pain.          Expected  Benefits  or  Goals  of  Opioid  Treatment     Improved  pain      Improved  ability  to  engage  in  work,  social,  recreational  and/or  physical  activities      Improved  quality  of  life            Potential  Risks  or  Side  Effects  of  Opioid  Treatment      Physical  side  effects May  include  mood  changes,  drowsiness,  nausea,  constipation,   urination  difficulties,  depressed  breathing,  itching,  bone  thinning  and  sexual  difficulties,   such  as  lowering  of  male  hormone  in  men  and  cessation  of  menstrual  periods  in  women.      Physical  dependence  Sudden  stopping  of  an  opioid  may  lead  to  withdrawal   symptoms   including  abdominal  cramping,  pain,  diarrhea,  sweating,  anxiety,  irritability  and  aching.      Tolerance   A  dose  of  an  opioid  may  become  less  effective  overtime  even  though  there  is  no   change  in  your  physical  condition.    If  this  happens  repeatedly,  your  medication  may  need  to   be  changed  or  discontinued.        Addiction  Is  more  common  in  people  with  personal  or  family  history  of  addiction,  but  can   occur  in  anyone.    It  is  suggested  by  drug  craving,  loss  of  control  and  poor  outcomes  of  use.        Hyperalgesia  Increased  sensitivity  to  and/or  increasing  experience  of  pain  caused  by  the   use  of  opioids  may  require  change  or  discontinuation  of  medication.      Overdose  Taking  more  than  the  prescribed  amount  of  medication  or  using  with  alcohol  or   other  drugs  can  cause  you  to  stop  breathing  resulting  in  coma,  brain  damage,  or  even  death.        Sleep  apnea  (periods  of  not  breathing  while  asleep)  may  be  caused  or  worsened  by  opioids.        Risk  to  unborn  child  Risks  to  unborn  children  may  include:  physical  dependence  at  birth,   possible  alterations  in  pain  perception,  possible  increased  risk  for  development  of  addiction,   among  others.    Tell  your  provider  if  you  are  or  intend  to  become  pregnant.      Victimization There  is  a  risk  that  you  or  your  household  may  be  subject  to  theft,  deceit,   assault  or  abuse  by  persons  seeking  to  obtain  your  medications  for  purposes  of  misuse.        Responsibilities  in  Opioid  Therapy  of  Chronic  Pain      Your provider's responsibilities: listening carefully to your concerns, treating you with  care and with due respect, and making clinical decisions based on what he/she believes is in  your best interest.    Your responsibilities: In order to maximize the potential  benefits of opioid medications and to  minimize the potential risks, it is important that you accept the following responsibilities. In  signing this agreement, you agree to:    1. Use your opioid medications as prescribed for the purpose of relieving pain  2. Keep your medications locked up to avoid intentional or unintentional use or diversion  by others. Discard all unused medications.  3. Be honest with your providers about your medication or other drug use.  4. Use no illegal drugs and not abuse alcohol while being prescribed opioids.  5. Not share, sell, trade or in any way provide your medications to others.  6. Receive opioid medications from this practice only. If opioids are prescribed  unexpectedly by another office (for example due to an accident or dental procedure),  inform this office within 24 hours.  7. Fill your opioid medications at one pharmacy only. Inform this practice within 24 hours  if you must use a pharmacy different from your usual one.  8. Have urine drugs tests on a random basis and as requested by your provider. (Opioid  may be discontinued if the tests are declined illicit drugs found or medication not present when should be.)  9. Bring your opioid medications to the practice when requested.  10. Participate in other pain treatments agreed to with your provider and keep all  appointments scheduled for your care.  11. Permit this practice to communicate with other care providers and/or your significant  others as needed to assure opioids are being used appropriately and are beneficial to  your health and well-being  12.  I understand that my clinic can track controlled substance prescriptions from other providers through a central database (prescription monitoring program).  13.   I will tell my clinic right away if I become pregnant or have a new medical problem [facility]    Your medications may be continued if they improve your pain, help you engage in  valued  activities, and/or enhance your quality of life and if you follow the above responsibilities.  Your medications may be discontinued if your goals for treatment are not met, if you experience negative  effects from using them, or if you do not follow this agreement.  If you develop complications of opioid use, such as addiction, we will assist you in finding  treatment. Please be aware, however, that our practice cooperates fully with law enforcement,  the [place] Drug Enforcement Agency and other agencies in the investigation of opioid-related  crimes including sharing, selling, trading or other potential harmful use of these powerful  Medications.    I have reviewed this document and been given the opportunity to have any questions  answered. I understand the possible benefits and risks of opioid medications and I accept the  responsibilities described above.    __________________________________         ____________________________________  Patient     Date   Rafi Ortega MD                     Date        Personal Care Plan for Chronic Pain    1.  Personal Goals: Be able to exercise and be more active     2.  Sleep:     *  Basic sleep plan:    *reduce or eliminate caffeine and daytime naps    * relaxation before bed    * limit screen time 1-2 hours prior to bed    * establish dark/quiet sleep environment    * go to bed at target bedtime    * keep consistent wake time   *  Nighttime medications including the following: Trazodone    3.  Physical Activity:     * Listen to your body.  Pace yourself for success.  Don't over-do it.  Don't under do it.   * Balance activities with rest and set realistic goals.     4.  Nutrition/Weight:     * Try to eat at least 5 servings of fresh fruits and vegetables each day.   * Limit processed foods and foods high in sugar, sodium and fat.   * Maintain a healthy weight.     5.  Mood/Stress Management:    * Formal interventions:    * Counseling  * Support group or therapy  group   * Home/community based interventions:  * Relaxation techniques  * Meditation  * Yoga  * Creative activity  * Spiritual /prayer  * Service-based activity.   * Medications: Celexa    6.  Tobacco/Alcohol/Drug Use:      * Maintain healthy relationship with alcohol    * For women this would be no more than 1 drink per day    * For men, this would be no more than 2 drinks per day   * Eliminate recreational drugs    7.  Pain:     * Non-medication treatments:    * ice/heat    * massage    * acupuncture    * chiropractor    8.  Pain Medications: Tramadol 100mg 4 times daily       MENTAL HEALTH REFERRAL    October 9, 2019  Mental Health referral routed to  to schedule CPM appointment with behavioral health team.  Marlen Quintanilla        [facility] 10/11/19    [facility] 10/14/19

## 2019-10-09 ENCOUNTER — DOCUMENTATION ONLY (OUTPATIENT)
Dept: PSYCHOLOGY | Facility: CLINIC | Age: 54
End: 2019-10-09

## 2019-10-13 NOTE — PROGRESS NOTES
Preceptor Attestation:   Patient seen, evaluated and discussed with the resident. I have verified the content of the note, which accurately reflects my assessment of the patient and the plan of care.   Supervising Physician:  Eric Gutierrez MD.  Results for orders placed or performed in visit on 10/08/19   Rapid Urine Drug Screen ([facility])   Result Value Ref Range    Cannabinoids Qual Urine POSITIVE (A) NEGATIVE    Phencyclidine NEGATIVE NEGATIVE    Cocaine Qual Urine NEGATIVE NEGATIVE    Methamphetamine Qual NEGATIVE NEGATIVE    Morphine Qual NEGATIVE NEGATIVE    Amphetamines Qual NEGATIVE NEGATIVE    Benzodiazepine Qual Urine NEGATIVE NEGATIVE    Tricyclic Antidepressants NEGATIVE NEGATIVE    Methadone Qual NEGATIVE NEGATIVE    Barbiturates Qual Urine NEGATIVE NEGATIVE    Oxycodone Qual NEGATIVE NEGATIVE    Propoxyphene NEGATIVE NEGATIVE    Buprenorphine Qual Urine NEGATIVE NEGATIVE    Temperature of Urine was Between  Degrees F YES YES

## 2019-10-22 ENCOUNTER — OFFICE VISIT (OUTPATIENT)
Dept: FAMILY MEDICINE | Facility: CLINIC | Age: 54
End: 2019-10-22
Payer: COMMERCIAL

## 2019-10-22 ENCOUNTER — OFFICE VISIT (OUTPATIENT)
Dept: PSYCHOLOGY | Facility: CLINIC | Age: 54
End: 2019-10-22
Payer: COMMERCIAL

## 2019-10-22 VITALS
HEIGHT: 70 IN | HEART RATE: 74 BPM | OXYGEN SATURATION: 94 % | DIASTOLIC BLOOD PRESSURE: 82 MMHG | SYSTOLIC BLOOD PRESSURE: 136 MMHG | WEIGHT: 235.8 LBS | RESPIRATION RATE: 18 BRPM | BODY MASS INDEX: 33.76 KG/M2 | TEMPERATURE: 97.9 F

## 2019-10-22 DIAGNOSIS — G89.4 CHRONIC PAIN SYNDROME: ICD-10-CM

## 2019-10-22 DIAGNOSIS — Z71.6 ENCOUNTER FOR SMOKING CESSATION COUNSELING: ICD-10-CM

## 2019-10-22 DIAGNOSIS — G89.4 CHRONIC PAIN SYNDROME: Primary | ICD-10-CM

## 2019-10-22 DIAGNOSIS — N52.8 OTHER MALE ERECTILE DYSFUNCTION: Primary | ICD-10-CM

## 2019-10-22 RX ORDER — SILDENAFIL 100 MG/1
100 TABLET, FILM COATED ORAL DAILY PRN
Qty: 30 TABLET | Refills: 0 | Status: SHIPPED | OUTPATIENT
Start: 2019-10-22 | End: 2021-04-07

## 2019-10-22 RX ORDER — NICOTINE 21 MG/24HR
1 PATCH, TRANSDERMAL 24 HOURS TRANSDERMAL EVERY 24 HOURS
Qty: 16 PATCH | Refills: 0 | Status: SHIPPED | OUTPATIENT
Start: 2019-10-22 | End: 2021-04-07

## 2019-10-22 RX ORDER — TRAMADOL HYDROCHLORIDE 50 MG/1
100 TABLET ORAL 4 TIMES DAILY
Qty: 120 TABLET | Refills: 0 | Status: SHIPPED | OUTPATIENT
Start: 2019-10-22 | End: 2019-11-12

## 2019-10-22 ASSESSMENT — MIFFLIN-ST. JEOR: SCORE: 1915.83

## 2019-10-22 NOTE — PATIENT INSTRUCTIONS
Personal Care Plan for Chronic Pain    1.  Personal Goals:  Mr. Bhatt would like to be more active in his life. Ideally would like to jog or walk more, however, roller skating has been a potential replacement. Was skating every Friday in the past, however, has been unable to keep up with this.    *  Sexual Health Goals: Sex drive has decreased and is considering medication (e.g., Viagra).   *  Other Functional Goals (e.g., hobbies, daily activities, etc.): Started playing drums again with a friend, going on walks, and would like to bike ride more.     2.  Sleep:     *  Basic sleep plan:    *reduce or eliminate caffeine and daytime naps    * relaxation before bed    * limit screen time 1-2 hours prior to bed    * establish dark/quiet sleep environment    * go to bed at target bedtime:  Midnight     * keep consistent wake time:  8 am.   *  Nighttime medications including the following: Trazodone    3.  Physical Activity:  Has been going on walks, is trying to bike and roller skate more.    * Listen to your body.  Pace yourself for success.  Don't over-do it.  Don't under do it.   * Balance activities with rest and set realistic goals.     4.  Nutrition/Weight: Working on eating more healthy by eating more fish and vegetables, and less fried foods.    * Try to eat at least 5 servings of fresh fruits and vegetables each day.   * Limit processed foods and foods high in sugar, sodium and fat.   * Maintain a healthy weight.     6.  Tobacco Use:      * Consider tobacco quit plan:  Plans to quit smoking.   Counseling: Meet with Dr. Capps if needed  Medication:  Considering the patch and other tobacco cessation supports.   Quit date: Wishes to speak with Dr. Ortega for medication beforehand    7.  Pain:     * Non-medication treatments:    * ice/heat    * massage    * acupuncture    * chiropractor    Follow up with Behavioral Health, Dr. Mack: November 12, 2019 at 11:00 am

## 2019-10-22 NOTE — PROGRESS NOTES
"Behavioral Health Consult for Chronic Pain Management    Visit type: Health and Behavior Assessment  Meeting lasted: 60 minutes  Others present: patient    Babar Bhatt is a 53 year old,  male referred by Dr. Ortega for behavioral health evaluation as part of the Chronic Pain Management protocol at [facility]. Goal of behavioral health visit is to assist PCP with assessment and to co-create a plan to help patient with psychosocial aspects of pain.     Topics Discussed:  Oriented patient to team model of care for chronic pain and scope and purpose of assessment today.    Patient's Understanding of Pain Experience: Patient reported that he has lumbar disc degeneration and he understands that this will get progressively worse. Said his back pain started 5 years ago. His doctor ordered an MRI which showed disc degeneration. Said that after trials of medication tramadol ended up working best to control pain and allow him to function.     Patient Goals: Patient would like to be more active, lead a healthier lifestyle, and enjoy some hobbies/activities outside of work, now that he has shifted to part-time. Developed the following behavioral health goals:    Improve diet by reducing fried food and eating more fish and vegetable. Has been eating more grilled salmon and salads lately.     Quit smoking. Will look into patch and other medications before setting a quit date.     Become more active. Identify physical activity that he can do and enjoys (e.g., biking or roller  skating). Has started going on more walks. Recently picked up drumming with a friend who plays the [employer].      Current self-management strategies: Has relied mostly on medication (\"it is the only thing that helps\"). During work, Mr. Bhatt will take periodic breaks and do some stretching to improve back pain. Said he sometimes smokes marijuana which helps him distract from the pain (\"it's still there but I just don't think " "about it\"). Provided brief psychoeducation on medical cannabis and prescription options.     Sleep: Reported sleep schedule from midnight to 8 am. Says he used to have more difficulty sleeping, however, trazodone has helped. Said he still wakes up every couple of hours sometimes.     Nutrition: Reported eating a lot of fried foods and unhealthy junk food. Said he does want to eat more healthy to lower blood pressure and try to lose some weight. Is focusing on eating more fish and vegetables. Reported drinking mountain dew daily, but is also trying to cut this down and replacing with tea.     Physical Activity: Reported a difficult time exercising. Said he has been told that he needs to exercising sitting down and lifting weights, however, feels that this is boring. Is trying to go on walks, roller skating, or biking, which are more tolerable with his back pain. Said he has tried physical therapy, but did not stick with it since he would feel sore and with worsened pain afterwards.     Psychiatric History: Was taking Celexa for depression in the past for about 6 years. However, this was discontinued once tramadol started. Mr. Bhatt has felt that tramadol provides similar benefits. Started taking Celexa on August 2010 due to symptoms of depression after father's death. Reported depression symptoms went into remission with medication.    History of psychosis:  Denied   History of susanne: Denied   FITO for current providers?  Yes: FITO signed today for Dr. Arguelles (psychiatrist)    Trauma History:  Denied    History of Substance Use:    Alcohol: Said he will drink alcohol once a week during date night   Tobacco: Smoking regularly for past 3 years, 1 pack every two days. Is interested in quitting   Caffeine: Mountain dew daily, trying to reduce and drink tea instead   Illicit Drugs: Reported smoking marijuana about 2-3 times per week. Said this is for recreational  use with girlfriend. Although noted that it sometimes helps " "him feel less pain by distracting him.   Past drug use: Reported cocaine use. 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 Reported that co-workers and mangers are supportive.     Other Relevant Information: Mr. Bhatt lives with his girlfriend. Said he has four adult children (ages 20 to 31) and three grandchildren (ages 2-5). Said his family is supportive and his children have encouraged him to take medication and take care of his health (\"I used to be against pills, but the medication has really helped\"). Reported having three sisters and one identical twin brother in town.     Patient Active Problem List   Diagnosis     CARDIOVASCULAR SCREENING; LDL GOAL LESS THAN 160       Current Outpatient Medications   Medication     amLODIPine (NORVASC) 10 MG tablet     amLODIPine (NORVASC) 5 MG tablet     nicotine (NICODERM CQ) 14 MG/24HR 24 hr patch     nicotine (NICORETTE) 2 MG gum     sildenafil (VIAGRA) 100 MG tablet     traMADol (ULTRAM) 50 MG tablet     traMADol (ULTRAM) 50 MG tablet     traZODone (DESYREL) 50 MG tablet     No current facility-administered medications for this visit.        Objective: Mr. Bhatt appears to be awake and alert for today's visit.    Opioid Evaluation tools:     DIRE Score:      10/22/2019   Total Score 18      Score 14-21: May be a candidate for long-term opioid analgesia    Source: [facility]   2005    Opioid Risk Tool Score:  OPIOID RISK TOOL TOTAL SCORE 10/22/2019   Total Score 5      Total Score Risk Category  4 - 7 =  Moderate Risk   of future problems with Opioid     Bedside opioid-overdose calculator (RIOSORD)    In the past six months, has your patient had an outpatient,          Points " for  inpatient or ED visit for any of the following:           'yes' answer    Substance use disorder (addiction) of any kind,     25   including alcohol and cannabis?  Bipolar disorder or schizophrenia?       10  Stroke or other cerebrovascular disease?          9  Significant chronic kidney disease?         8  Heart failure?           7  Nonmalignant pancreatic disease?         7  Chronic pulmonary disease?          5  Chronic headache?           5  Does your patient take:  Fentanyl (transdermal or transmucosal)?      13  Morphine?          11  Methadone?          10  Hydromorphone?           7  Extended-release or long-acting (ER/LA) opioid?          5  Benzodiazepine?           9  Antidepressant?           8   >100 mg MME/day?                      7  TOTAL   8    Average probability of overdose or serious opioid-induced respiratory depression in the next six months   Points    Risk   0-4     2%   5-7     5%   8-9     7%   10-17     15%   18-25     30%   26-41     55%   42     83%     Other Assessment Tools:    DAST-10 Questionnaire  I m going to read you a list of questions concerning information about your potential involvement with drugs, excluding alcohol and tobacco, during the past 12 months.  When the words  drug abuse  are used, they mean the use of prescribed or over-the-counter medications/drugs in excess of the directions and any non-medical use of drugs. The various classes of drugs may include: cannabis (e.g., marijuana, hash), solvents, tranquilizers (e.g., Valium), barbiturates, cocaine, stimulants (e.g., speed), hallucinogens (e.g., LSD) or narcotics (e.g., heroin). Remember that the questions do not include alcohol or tobacco.    If you have difficulty with a statement, then choose the response that is mostly right. You may choose to answer or not answer any of the questions in this section.    These questions refer to the past 12 months Yes No Score   Have you used drugs other than those  "required for medical reasons?  1 0 0   Do you abuse more than one drug at a time? 1 0 0   Are you always able to stop using drugs when you want to? (If never use drugs, answer  Yes. ) * (reverse scored) 0 1 0   Have you had \"blackouts\" or \"flashbacks\" as a result of drug use?  1 0 0   Do you ever feel bad or guilty about your drug use? If never use drugs, choose  No.   1 0 0   Does your spouse (or parents) ever complain about your involvement with drugs? 1 0 0   Have you neglected your family because of your use of drugs? 1 0 0   Have you engaged in illegal activities in order to obtain drugs?  1 0 0   Have you ever experienced withdrawal symptoms (felt sick) when you stopped taking drugs? 1 0 0   Have you had medical problems as a result of your drug use (e.g.,  memory loss, hepatitis, convulsions, bleeding, etc.)? 1 0 0   TOTAL   0     DAST-10 Score Degree of problems Related to Drug Abuse Suggested Action   0 No problems reported None at this time   1-2 Low Level Monitor, re-assess at later timeNo issues identified.   3-5 Moderate Level Brief Intervention   6-8 Substantial Level Referral for brief therapy or treatment   9-10 Severe Level Refer for treatment       Functional Assessment  Questionnaire -5  FUNCTIONAL ASSESSMENT QUESTIONNAIRE SCORE 10/22/2019 10/22/2019   Total Score 65 65       Lifestyle Risk Screening Tool  10/22/2019   How many hours of sleep do you get most days? 8   How many times a day do you eat sweets or fried/processed foods? 1   How many 8 oz servings of sugared drinks (soda, juice, etc.) do you have per day? 2   How many servings of fruit and vegetables do you eat a day? 2 or less   How many hours of screen time (TV, Tablet, Video Games, phone, etc.) do you have per day? 4 or more   How many days a week do you exercise enough to make your heart beat faster? 3 or less   How many minutes a day do you exercise enough to make your heart beat faster? 9 or less   How often are you around others " who are smoking? Daily   How often do you use tobacco products of any kind? Rarely   How often do you use e-cigarettes or vape?  Never   How many alcoholic drinks do you have in one week? 0 to 14   How many alcoholic drinks do you have in one day? 0 to 2       Assessment:   Mr. Bhatt was referred by Dr. Ortega for a behavioral health evaluation to address chronic pain syndrome.  Based on assessment today, Mr. Bhatt's estimated DIRE score would be 18 which may indicate that he may be a suitable candidate for opioid therapy.  His ORT score of 5 places him at moderate risk for developing problems with opioid therapy and his RIOSORD score of 8 indicates that he  would have an 7% chance of overdose or serious opioid-induced respiratory depression in the next six months.  If medical cannabis were to be considered, it should be remembered that past history of substance use disorder is a relative contraindication.  That being said, Mr. Bhatt is currently using marijuana without apparent incident or problem and enrollment in medical cannabis program could be considered from a risk mitigation perspective.  Mr. Bhatt would likely benefit from additional behavioral health support to address poor nutrition and tobacco cessation. He was receptive to this discussion and interested in scheduling with this provider for additional support.    Stage of change: Preparatory  Diagnosis: chronic pain syndrome.    Plan:   1. Provided psychoeducation about the relationships between chronic pain, sleep, physical activity, tobacco and other alcohol/drug use, nutrition/weight and stress.   2. Developed Personal Care Plan for Chronic Pain for self-management strategies (see patient care instructions).  Care Plan Date: October 22, /2019  3. Follow up with [facility] is recommended - Mr. Bhatt scheduled follow-up on November 12, 2019.

## 2019-10-22 NOTE — Clinical Note
Hello, I met with your patient for CPM visit. Please see my following note and let me know if you have any questions.

## 2019-10-22 NOTE — PATIENT INSTRUCTIONS
Chronic Pain Management Visit      Goals of Chronic Pain therapy:     Increase function     Decrease suffering     This process may not get rid of pain completely.    There are no diagnoses linked to this encounter.      Thank you for coming to Clinic today.  Lab Testing:  **If you had lab testing today and your results are reassuring or normal they will be mailed to you or sent through [employer] within 7 days.   **If the lab tests need quick action we will call you with the results.  The phone number we will call with results is # [phone] (home) none (work). If this is not the best number please call our clinic and change the number.  Medication Refills:  If you need any refills please call your pharmacy and they will contact us. Opioid refills will only be refilled at visits, any lost opioid prescriptions will not be replaced.  If you need to  your refill at a new pharmacy, please contact the new pharmacy directly. The new pharmacy will help you get your medications transferred faster.   Scheduling:  If you have any concerns about today's visit or wish to schedule another appointment please call our office during normal business hours   (8-5:00 M-F)    Medical Concerns:  If you have urgent medical concerns please call [phone] at any time of the day.  If you have a medical emergency please call [phone].  Again thank you for choosing [address] [address] [phone]  and please let us know how we can best partner with you to improve you and your family's health.            Guide to Smoking Cessation  [facility]    Congratulations!  Making the decision to quit smoking cigarettes is often very difficult, so CONGRATULATIONS on making the first steps towards a smoke-free lifestyle.     What are the Dangers of Smoking?  ? Each year, more than 480,000 Americans die from cigarette-related causes  ? Over 7,000 chemicals in cigarette smoke,  at least 69 cause cancer  ? Cigarettes contain chemicals found in nail polish, hair dye, household , rat poisons, batteries, and materials to pave roads  ? Risk of death from any cause is up to 3 times higher in people who smoke  ? Risk of Chronic Obstructive Pulmonary Disease (COPD; an irreversible lung disease) is about     25 times more likely  ? Risk of Lung Cancer is about 24 times more likely  ? Increased Risk of Death from:  o Heart Disease  o Stroke  o Infections (pneumonia, influenza, tuberculosis)  o Diabetes  o Liver Disease  o Ischemic Disorders of Intestines (loss of blood flow to intestines)  o Damage to Blood Vessels  o Kidney Failure  ? Increased Risk of Many Types of Cancer      ? How does Smoking Affect the Health of the People Around Me?   ? Dangers of Second-Hand Smoke (contributes to death of  38,000 Americans each year):   o Lung cancer in people who have never smoked  o Cancer of larynx (voice box), throat, nasal sinuses, brain, bladder, rectum, stomach, and breast  o Linked to childhood cancers such as lymphoma, leukemia, liver cancer, and brain tumors  o Effects on children s health:  - Get sick more often (bronchitis, pneumonia, ear infections)  - More likely to cough, wheeze, and have shortness of breath (risk of asthma)  o Linked to stroke, nasal irritation, lung cancer, and heart disease in adults  ? Third-Hand Smoke:  o Residual nicotine and other chemicals left on various indoor surfaces react with common indoor pollutants to create dangerous toxic chemicals  o It attaches to hair, skin, clothes, furniture, drapes, walls, bedding, carpets, dust, vehicles, and   other surfaces even after the smoking has stopped      When Do I Start my New Life without Cigarettes?  ? Set a quit date in the near future (with a few weeks)  ? This is the day that you will remember forever as the day you stopped smoking cigarettes  ? What will your day be? Please write it down.  I will remember the  day I quit smoking cigarettes as _________________________________.  How Much Money will I Save by Quitting Smoking?  ? If I smoke about a pack per day:  o Cigarettes are about $10 per pack  o $70/week ? $300/month ? $3,600/year  How Much Time will I Save by Quitting Smoking?  ? If I smoke a pack per day:  o Cigarettes take about 6 minutes to smoke ? You will get back 30 days per year  How do I Sussex with Cravings?  ? Most major cravings only last ~1-3 minutes and tend to get better after just 10 minutes!  ? First week or two are most difficult but YOU CAN DO IT!  ? What  triggers  do you have for smoking?   ? Avoid places where others are smoking   ? Ask friends to not smoke in front of you or quit with a friend  ? Put notecard with reasons for quitting where cigarettes are normally kept  ? Chew on sugarless gum or suck on hard candy, mints, or lollipops  ? Use a toothpick or straw to help mouth busy  ? Keep rubber band on wrist - let it remind you of quitting and play with it if you need to use your hands  ? Snack on carrots or celery sticks  ? Brush teeth with mint flavored toothpaste or use mouth wash immediately after waking up or as needed throughout day  ? Drink a glass of fruit juice or water instead   ? Take a walk or exercise more  ? Call or visit a friend  ? Play with children or pets instead  ? Deep breathing or  mini mental vacation   ? Take a bath or shower - especially right after waking up  ? Learn a new hobby with your extra free time  ? Wash dishes by hand or just wash your hands more frequently  ? Change the flavor of coffee you drink or try hot tea instead  ? Which strategies will work best for you to quit smoking?   Nicotine Replacement Therapy (NRT):    1) Nicotine Patch (Nicoderm CQ ):    Reason to use: It gives your body nicotine throughout the day to reduce cravings    Side Effects: Skin irritation, vivid dreams, insomnia, GI complaints    2) Nicotine Gum (Nicorette ):    Reason to use: To  replace cigarette for  breakthrough  cravings    Side Effects: mouth and jaw soreness, hiccups, stomach upset     3) Nicotine Lozenge:    Reason to use: To replace cigarette for  breakthrough  cravings    Side Effects: hiccups, nausea, and heartburn    4) Nicotine Inhaler:    Reason to use: To replace cigarette for  breakthrough  cravings     It is not an e-cigarette; it s a medication to help with cravings    Side Effects: throat irritation, coughing, and nasal irritation    Non-Nicotine Replacement Therapies:  1) Varenicline (Chantix ):    Reason to use:  o Partially blocks the effects of nicotine in your brain and partially acts like nicotine    Side Effects: Nausea, headache, flatulence, difficulty sleeping (insomnia), abnormal dreams, abnormal taste in mouth  o (RARE) If you notice any change in your behavior or mood or have any thoughts of harming yourself or others, please stop using right away and contact your doctor     2) Bupropion SR (Zyban ):    Reason to use:  o Increases the amount of dopamine in your brain; helps physical and psychological addition  o May help withdrawal symptoms and some people even think it makes cigarettes taste bad  o Discontinue if no significant progress toward abstinence by 7 weeks    Side effects: difficulty sleeping (insomnia), dry mouth  o (RARE) If you notice any change in your behavior or mood or have any thoughts of harming yourself or others, please stop using right away and contact your doctor

## 2019-10-22 NOTE — PROGRESS NOTES
Behavioral Health Consult for Chronic Pain Management    Visit type: Health and Behavior Assessment  Meeting lasted: *** minutes  Others present: patient    Babar Bhatt is a 53 year old,  male referred by Dr. Ortega for behavioral health evaluation as part of the Chronic Pain Management protocol at [facility]. Goal of behavioral health visit is to assist PCP with assessment and to co-create a plan to help patient with psychosocial aspects of pain.     Topics Discussed:  Oriented patient to team model of care for chronic pain and scope and purpose of assessment today.    Patient's Understanding of Pain Experience: *** Lumbar disc degeneration. Back pain started 5 years ago. Tramadol ended up working the past.     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 Needs to take a few minute break for stretching to help with pain.  Current self-management strategies: *** Medication is the only thing that helps. Stretching helps    Sleep: Takes trazodone for sleep. This helps although still wakes up every couple of hours sometimes.     Nutrition:  *** Reported eating fried foods and unhealthy junk food. Said he does want to eat more healthy by eating more fish, salads, vegetables    Physical Activity:  *** Tried physical therapy. Felt better right during the therapy, but afterwards would feel sore and with worsened pain.    Psychiatric History: Was taking celexa for depression in the past for about 6 years. However, this was discontinued once tramadol started. Mr. Bhatt has felt that tramadol provides similar benefits. Started taking celexa in August 2010 due to symptoms of depression after father's death. Reported depression symptoms went into remission with medication.    History of psychosis:  Denied   FITO for current providers?  Yes: FITO signed today for Dr. Arguelles    Trauma History:  Denied    History of Substance Use:    Alcohol: *** Once a  week    Tobacco: *** Smoking regularly for past 3 years, 1 pack every two days, interested in quitting   Caffeine: *** Mountain dew, used to be daily but is trying to change, trying to drink tea   Illicit Drugs: *** marijuana about three times per week. Past: cocaine use, got treatment, sober for 3.5 years   Misuse of Prescription Drugs: denied     Family History of Substance Use:  none    Alcohol: ***    Illicit Drugs: ***   Misuse of Prescription Drugs: ***    Lives with girlfriend. Four children from 19 to 30, 3 grandchildren (2 to 5 years old). Three sisters and 1 twin brother. Close to brother    Work History: part-time work     Other Relevant Information: ***    Patient Active Problem List   Diagnosis     CARDIOVASCULAR SCREENING; LDL GOAL LESS THAN 160       Current Outpatient Medications   Medication     amLODIPine (NORVASC) 10 MG tablet     amLODIPine (NORVASC) 5 MG tablet     traMADol (ULTRAM) 50 MG tablet     traMADol (ULTRAM) 50 MG tablet     traZODone (DESYREL) 50 MG tablet     No current facility-administered medications for this visit.          Objective: Mr. Bhatt appears to be {ORIENTATION:407220} for today's visit.    Opioid Evaluation tools:  (Enter responses in flowsheets)    DIRE Score:    No flowsheet data found.   {Dire Score:674498}    Source: [facility]   2005    Opioid Risk Tool Score:  No flowsheet data found.   Total Score Risk Category {Risk:965078} of future problems with Opioid     Bedside opioid-overdose calculator (RIOSORD)  {Bold positives and % risk at the bottom.}    In the past six months, has your patient had an outpatient,          Points for  inpatient or ED visit for any of the following:           'yes' answer    Substance use disorder (addiction) of any kind,     25   including alcohol and cannabis?  Bipolar disorder or schizophrenia?       10  Stroke or other cerebrovascular disease?          9  Signifi cant chronic kidney  "disease?         8  Heart failure?           7  Nonmalignant pancreatic disease?         7  Chronic pulmonary disease?          5  Chronic headache?           5  Does your patient take:  Fentanyl (transdermal or transmucosal)?      13  Morphine?          11  Methadone?          10  Hydromorphone?           7  Extended-release or long-acting (ER/LA) opioid?          5  Benzodiazepine?           9  Antidepressant?           8   >100 mg MME/day?                      7  TOTAL   ***    Average probability of overdose or serious opioid-induced respiratory depression in the next six months   Points    Risk   0-4     2%   5-7     5%   8-9     7%   10-17     15%   18-25     30%   26-41     55%   42     83%    Other Assessment Tools:    DAST-10 Questionnaire  I m going to read you a list of questions concerning information about your potential involvement with drugs, excluding alcohol and tobacco, during the past 12 months.  When the words  drug abuse  are used, they mean the use of prescribed or over-the-counter medications/drugs in excess of the directions and any non-medical use of drugs. The various classes of drugs may include: cannabis (e.g., marijuana, hash), solvents, tranquilizers (e.g., Valium), barbiturates, cocaine, stimulants (e.g., speed), hallucinogens (e.g., LSD) or narcotics (e.g., heroin). Remember that the questions do not include alcohol or tobacco.    If you have difficulty with a statement, then choose the response that is mostly right. You may choose to answer or not answer any of the questions in this section.    These questions refer to the past 12 months Yes No Score   Have you used drugs other than those required for medical reasons?  1 0 {0-1:076953}   Do you abuse more than one drug at a time? 1 0 {0-1:258842}   Are you always able to stop using drugs when you want to? (If never use drugs, answer  Yes. ) * (reverse scored) 0 1 {0-1:478249}   Have you had \"blackouts\" or \"flashbacks\" as a result of " "drug use?  1 0 {0-1:741593}   Do you ever feel bad or guilty about your drug use? If never use drugs, choose  No.   1 0 {0-1:062334}   Does your spouse (or parents) ever complain about your involvement with drugs? 1 0 {0-1:679958}   Have you neglected your family because of your use of drugs? 1 0 {0-1:103667}   Have you engaged in illegal activities in order to obtain drugs?  1 0 {0-1:489663}   Have you ever experienced withdrawal symptoms (felt sick) when you stopped taking drugs? 1 0 {0-1:666228}   Have you had medical problems as a result of your drug use (e.g.,  memory loss, hepatitis, convulsions, bleeding, etc.)? 1 0 {0-1:488139}   TOTAL   ***     DAST-10 Score Degree of problems Related to Drug Abuse Suggested Action   0 No problems reported None at this time   1-2 Low Level Monitor, re-assess at later time{Issues :521890}   3-5 Moderate Level Brief Intervention   6-8 Substantial Level Referral for brief therapy or treatment   9-10 Severe Level Refer for treatment       Functional Assessment  Questionnaire -5  FUNCTIONAL ASSESSMENT QUESTIONNAIRE SCORE 10/8/2019   Total Score 70       No flowsheet data found.    No flowsheet data found.    Assessment:   Mr. Bhatt was referred by  *** for a behavioral health evaluation to address chronic pain syndrome.  Based on assessment today, Mr. Bhatt's estimated DIRE score would be *** which may indicate that {he / she - lower case:428026} {MAY:412889} be a suitable candidate for opioid therapy.  {His/HerCap:858985} ORT score of ***places{ Him Her Them:685643::\"them\"} at *** risk for developing problems with opioid therapy and {his/her/their:102712} RIOSORD score of *** indicates that {he / she - lower case:977593}  would have an ***% chance of overdose or serious opioid-induced respiratory depression in the next six months.  If medical cannabis were to be considered ***  Given these findings, ***.  Mr. Bhatt would likely benefit from additional behavioral health " support to address *** (choose what applies - poor sleep, limited physical activity, poor nutrition and stress management).  {HE/SHE/THEY:336223} was receptive to this discussion and interested in scheduling with this provider for additional support.    Stage of change: {STAGES OF CHANGE:781083}  Diagnosis: chronic pain syndrome.    Plan:   1. Provided psychoeducation about the relationships between chronic pain, sleep, physical activity, tobacco and other alcohol/drug use, nutrition/weight and stress.   2. Developed Personal Care Plan for Chronic Pain for self-management strategies (see patient care instructions).  Care Plan Date: {Care Plan  Months:445862}/{Years 7252-0477:214324}  3. Follow up with [facility] {IS/IS NOT:9024} recommended        Note to Clinician:  Copy Personal Care Plan (BHPTCPMPERSONALCAREPLAN) into patient instructions and print for patient   Route note to PCP   Update overview comments for Chronic Pain Syndrome on the problem list using (BTCPMPROBLEMLIST) dot phrase  Route to Supervisory Committee (BTCPMSUPERVISORYCOMMITTEEREFERRAL) if appropriate (i.e., ORT indicates High risk with >8 score; DIRE indicates Not suitable candidate with <13 score; additional concerns or risks)

## 2019-10-22 NOTE — PROGRESS NOTES
Preceptor Attestation:   Patient seen, evaluated and discussed with the resident. I have verified the content of the note, which accurately reflects my assessment of the patient and the plan of care.   Supervising Physician:  Curtis Underwood MD

## 2019-10-22 NOTE — PROGRESS NOTES
"  Chronic Pain Follow-Up Visit    Pain Update:  Location of pain: back  Analgesia/pain control: Recent changes:  improved    Overall control: Tolerable with discomfort    Adherance     How often do you take extra pain medicine:Never    Did you take your pain medication today? YES    Adverse effects: No      Database checked today? Yes. Details: as expected     No flowsheet data found.  No flowsheet data found.    Care Plan discussed and updated as needed.  See the end of this note.     FUNCTIONAL ASSESSMENT QUESTIONNAIRE SCORE 10/8/2019 10/22/2019   Total Score 70 65        Problem, Medication and Allergy Lists were reviewed and are current..           Physical Exam:     Vitals:    10/22/19 1142   BP: 136/82   Pulse: 74   Resp: 18   Temp: 97.9  F (36.6  C)   TempSrc: Oral   SpO2: 94%   Weight: 107 kg (235 lb 12.8 oz)   Height: 1.778 m (5' 10\")     Body mass index is 33.83 kg/m .  Vitals were reviewed and were normal  GENERAL: healthy, alert, well nourished, well hydrated, no distress  RESP: lungs clear to auscultation - no rales, no rhonchi, no wheezes  CV: regular rates and rhythm, normal S1 S2, no S3 or S4 and no murmur, no click or rub -  ABDOMEN: soft, no tenderness, no  hepatosplenomegaly, no masses, normal bowel sounds        Results:     Results for orders placed or performed in visit on 10/08/19   Rapid Urine Drug Screen ([facility])   Result Value Ref Range    Cannabinoids Qual Urine POSITIVE (A) NEGATIVE    Phencyclidine NEGATIVE NEGATIVE    Cocaine Qual Urine NEGATIVE NEGATIVE    Methamphetamine Qual NEGATIVE NEGATIVE    Morphine Qual NEGATIVE NEGATIVE    Amphetamines Qual NEGATIVE NEGATIVE    Benzodiazepine Qual Urine NEGATIVE NEGATIVE    Tricyclic Antidepressants NEGATIVE NEGATIVE    Methadone Qual NEGATIVE NEGATIVE    Barbiturates Qual Urine NEGATIVE NEGATIVE    Oxycodone Qual NEGATIVE NEGATIVE    Propoxyphene NEGATIVE NEGATIVE    Buprenorphine Qual Urine NEGATIVE NEGATIVE    Temperature of Urine was " Between  Degrees F YES YES      rapid urine drug screen obtained today: Yes    Assessment and Plan    Babar Bhatt is here for follow up of chronic pain caused by low back pain.    Babar was seen today for pain management.    Diagnoses and all orders for this visit:    Other male erectile dysfunction  -     sildenafil (VIAGRA) 100 MG tablet; Take 1 tablet (100 mg) by mouth daily as needed (Erectile dysfunction)    Chronic pain syndrome  -     traMADol (ULTRAM) 50 MG tablet; Take 2 tablets (100 mg) by mouth 4 times daily  -     Rapid Urine Drug Screen ([facility])    Encounter for smoking cessation counseling  -     nicotine (NICODERM CQ) 14 MG/24HR 24 hr patch; Place 1 patch onto the skin every 24 hours  -     nicotine (NICORETTE) 2 MG gum; Place 1 each (2 mg) inside cheek as needed for smoking cessation      Chronic Pain Syndrome:  Care plan updated with patient, see below for details.  Patient is being prescribed 400 mg of tramadol IR per day. 40 mg MEDD  Care Plan Date: October/2019  Naloxone has not been prescribed.     Smoking Cessation  - Additionally discussed smoking cessation. He is currently smoking 1/2 ppd and would like to try the patch in addition to gum for cravings.     Erectile Dysfunction  - Has trouble gaining erections and would like to try Viagra. He is not taking nitrates. Good Rx coupon printed along with the prescription.       If opioids prescribed patient was asked to bring pill bottle to each appointment and was informed that refills would only be provided at office visits.   Asked patient to F/U in 2 weeks with same provider for 20 minute  Visit.     I precepted today with Curtis Underwood MD.     Rafi Ortega, PGY3  [facility] Medicine          Patient Instructions      Chronic Pain Management Visit      Goals of Chronic Pain therapy:     Increase function     Decrease suffering     This process may not get rid of pain completely.    There are no diagnoses linked to this  encounter.      Thank you for coming to Clinic today.  Lab Testing:  **If you had lab testing today and your results are reassuring or normal they will be mailed to you or sent through [employer] within 7 days.   **If the lab tests need quick action we will call you with the results.  The phone number we will call with results is # [phone] (home) none (work). If this is not the best number please call our clinic and change the number.  Medication Refills:  If you need any refills please call your pharmacy and they will contact us. Opioid refills will only be refilled at visits, any lost opioid prescriptions will not be replaced.  If you need to  your refill at a new pharmacy, please contact the new pharmacy directly. The new pharmacy will help you get your medications transferred faster.   Scheduling:  If you have any concerns about today's visit or wish to schedule another appointment please call our office during normal business hours   (8-5:00 M-F)    Medical Concerns:  If you have urgent medical concerns please call [phone] at any time of the day.  If you have a medical emergency please call [phone].  Again thank you for choosing [address] [address] [phone]  and please let us know how we can best partner with you to improve you and your family's health.            Guide to Smoking Cessation  [facility]    Congratulations!  Making the decision to quit smoking cigarettes is often very difficult, so CONGRATULATIONS on making the first steps towards a smoke-free lifestyle.     What are the Dangers of Smoking?  ? Each year, more than 480,000 Americans die from cigarette-related causes  ? Over 7,000 chemicals in cigarette smoke, at least 69 cause cancer  ? Cigarettes contain chemicals found in nail polish, hair dye, household , rat poisons, batteries, and materials to pave roads  ? Risk of death from any cause is up to 3  times higher in people who smoke  ? Risk of Chronic Obstructive Pulmonary Disease (COPD; an irreversible lung disease) is about     25 times more likely  ? Risk of Lung Cancer is about 24 times more likely  ? Increased Risk of Death from:  o Heart Disease  o Stroke  o Infections (pneumonia, influenza, tuberculosis)  o Diabetes  o Liver Disease  o Ischemic Disorders of Intestines (loss of blood flow to intestines)  o Damage to Blood Vessels  o Kidney Failure  ? Increased Risk of Many Types of Cancer      ? How does Smoking Affect the Health of the People Around Me?   ? Dangers of Second-Hand Smoke (contributes to death of  38,000 Americans each year):   o Lung cancer in people who have never smoked  o Cancer of larynx (voice box), throat, nasal sinuses, brain, bladder, rectum, stomach, and breast  o Linked to childhood cancers such as lymphoma, leukemia, liver cancer, and brain tumors  o Effects on children s health:  - Get sick more often (bronchitis, pneumonia, ear infections)  - More likely to cough, wheeze, and have shortness of breath (risk of asthma)  o Linked to stroke, nasal irritation, lung cancer, and heart disease in adults  ? Third-Hand Smoke:  o Residual nicotine and other chemicals left on various indoor surfaces react with common indoor pollutants to create dangerous toxic chemicals  o It attaches to hair, skin, clothes, furniture, drapes, walls, bedding, carpets, dust, vehicles, and   other surfaces even after the smoking has stopped      When Do I Start my New Life without Cigarettes?  ? Set a quit date in the near future (with a few weeks)  ? This is the day that you will remember forever as the day you stopped smoking cigarettes  ? What will your day be? Please write it down.  I will remember the day I quit smoking cigarettes as _________________________________.  How Much Money will I Save by Quitting Smoking?  ? If I smoke about a pack per day:  o Cigarettes are about $10 per pack  o $70/week ?  $300/month ? $3,600/year  How Much Time will I Save by Quitting Smoking?  ? If I smoke a pack per day:  o Cigarettes take about 6 minutes to smoke ? You will get back 30 days per year  How do I [place] with Cravings?  ? Most major cravings only last ~1-3 minutes and tend to get better after just 10 minutes!  ? First week or two are most difficult but YOU CAN DO IT!  ? What  triggers  do you have for smoking?   ? Avoid places where others are smoking   ? Ask friends to not smoke in front of you or quit with a friend  ? Put notecard with reasons for quitting where cigarettes are normally kept  ? Chew on sugarless gum or suck on hard candy, mints, or lollipops  ? Use a toothpick or straw to help mouth busy  ? Keep rubber band on wrist - let it remind you of quitting and play with it if you need to use your hands  ? Snack on carrots or celery sticks  ? Brush teeth with mint flavored toothpaste or use mouth wash immediately after waking up or as needed throughout day  ? Drink a glass of fruit juice or water instead   ? Take a walk or exercise more  ? Call or visit a friend  ? Play with children or pets instead  ? Deep breathing or  mini mental vacation   ? Take a bath or shower - especially right after waking up  ? Learn a new hobby with your extra free time  ? Wash dishes by hand or just wash your hands more frequently  ? Change the flavor of coffee you drink or try hot tea instead  ? Which strategies will work best for you to quit smoking?   Nicotine Replacement Therapy (NRT):    1) Nicotine Patch (Nicoderm CQ ):    Reason to use: It gives your body nicotine throughout the day to reduce cravings    Side Effects: Skin irritation, vivid dreams, insomnia, GI complaints    2) Nicotine Gum (Nicorette ):    Reason to use: To replace cigarette for  breakthrough  cravings    Side Effects: mouth and jaw soreness, hiccups, stomach upset     3) Nicotine Lozenge:    Reason to use: To replace cigarette for  breakthrough   cravings    Side Effects: hiccups, nausea, and heartburn    4) Nicotine Inhaler:    Reason to use: To replace cigarette for  breakthrough  cravings     It is not an e-cigarette; it s a medication to help with cravings    Side Effects: throat irritation, coughing, and nasal irritation    Non-Nicotine Replacement Therapies:  1) Varenicline (Chantix ):    Reason to use:  o Partially blocks the effects of nicotine in your brain and partially acts like nicotine    Side Effects: Nausea, headache, flatulence, difficulty sleeping (insomnia), abnormal dreams, abnormal taste in mouth  o (RARE) If you notice any change in your behavior or mood or have any thoughts of harming yourself or others, please stop using right away and contact your doctor     2) Bupropion SR (Zyban ):    Reason to use:  o Increases the amount of dopamine in your brain; helps physical and psychological addition  o May help withdrawal symptoms and some people even think it makes cigarettes taste bad  o Discontinue if no significant progress toward abstinence by 7 weeks    Side effects: difficulty sleeping (insomnia), dry mouth  o (RARE) If you notice any change in your behavior or mood or have any thoughts of harming yourself or others, please stop using right away and contact your doctor

## 2019-10-23 NOTE — PROGRESS NOTES
I have reviewed and agree with the behavioral health fellow's documentation for this visit.  I did not personally see the patient.  Amber Hsieh, PhD., LP

## 2019-10-29 ENCOUNTER — TELEPHONE (OUTPATIENT)
Dept: FAMILY MEDICINE | Facility: CLINIC | Age: 54
End: 2019-10-29

## 2019-10-29 DIAGNOSIS — I10 BENIGN ESSENTIAL HYPERTENSION: ICD-10-CM

## 2019-10-31 RX ORDER — AMLODIPINE BESYLATE 10 MG/1
10 TABLET ORAL DAILY
Qty: 30 TABLET | Refills: 3 | Status: SHIPPED | OUTPATIENT
Start: 2019-10-31 | End: 2020-06-26

## 2019-11-12 ENCOUNTER — OFFICE VISIT (OUTPATIENT)
Dept: FAMILY MEDICINE | Facility: CLINIC | Age: 54
End: 2019-11-12
Payer: COMMERCIAL

## 2019-11-12 ENCOUNTER — OFFICE VISIT (OUTPATIENT)
Dept: PSYCHOLOGY | Facility: CLINIC | Age: 54
End: 2019-11-12
Payer: COMMERCIAL

## 2019-11-12 VITALS
HEART RATE: 93 BPM | DIASTOLIC BLOOD PRESSURE: 79 MMHG | BODY MASS INDEX: 34.63 KG/M2 | RESPIRATION RATE: 16 BRPM | WEIGHT: 233.8 LBS | TEMPERATURE: 97.3 F | OXYGEN SATURATION: 96 % | HEIGHT: 69 IN | SYSTOLIC BLOOD PRESSURE: 144 MMHG

## 2019-11-12 DIAGNOSIS — G89.4 CHRONIC PAIN SYNDROME: Primary | ICD-10-CM

## 2019-11-12 DIAGNOSIS — I10 BENIGN ESSENTIAL HYPERTENSION: ICD-10-CM

## 2019-11-12 DIAGNOSIS — Z71.6 ENCOUNTER FOR SMOKING CESSATION COUNSELING: ICD-10-CM

## 2019-11-12 LAB
AMPHETAMINES QUAL: NEGATIVE
BARBITURATES QUAL URINE: NEGATIVE
BENZODIAZEPINE QUAL URINE: NEGATIVE
BUPRENORPHINE QUAL URINE: NEGATIVE
CANNABINOIDS UR QL SCN: POSITIVE
COCAINE QUAL URINE: ABNORMAL
METHAMPHETAMINE: NEGATIVE
METHODONE QUAL: NEGATIVE
MORPHINE QUAL: NEGATIVE
OXYCODONE QUAL: NEGATIVE
PHENCYCLIDINE: NEGATIVE
PROPOXYPHENE: NEGATIVE
TEMPERATURE OF URINE WAS BETWEEN 90-100 DEGREES F: YES
TRICYCLIC ANTIDEPRESSANTS: NEGATIVE

## 2019-11-12 RX ORDER — TRAMADOL HYDROCHLORIDE 50 MG/1
50 TABLET ORAL EVERY 6 HOURS PRN
Status: CANCELLED | OUTPATIENT
Start: 2019-11-12

## 2019-11-12 RX ORDER — TRAMADOL HYDROCHLORIDE 50 MG/1
100 TABLET ORAL 4 TIMES DAILY
Qty: 240 TABLET | Refills: 0 | Status: SHIPPED | OUTPATIENT
Start: 2019-11-12 | End: 2021-04-28

## 2019-11-12 ASSESSMENT — MIFFLIN-ST. JEOR: SCORE: 1878.01

## 2019-11-12 NOTE — PATIENT INSTRUCTIONS
Thank you for coming in today!  The homework/goals we discussed today are:     Great job on your plan to stop smoking this week! Please let me know how it goes    Remember to pace yourself during work and when you start exercising  Please follow-up in 1 month. Stop by the  to schedule this appointment after today's visit. This can be a 30 minute appointment.            AUTUMN Souza., JOLANTA Yusuf., JUDAH Russ., RICHARD Pandey., TORIBIO Dinh., & SHANDRA Willard. Cognitive behavioral therapy for chronic pain among veterans: Therapist manual. [place], [place]: [employer].

## 2019-11-12 NOTE — PROGRESS NOTES
Behavioral Health Chronic Pain Management Visit     Visit type: Follow Up  Meeting lasted: 30 minutes  Others present: none     Reason for Consultation:  Babar Bhatt is a 54 year old,  male referred by Dr. Ortega for behavioral health consultation as part of the Chronic Pain Management protocol at [facility]. Goal of behavioral health visit is to help the patient with the psychosocial aspects of pain management.     Patient Goals: Patient would like to be more active, lead a healthier lifestyle, and enjoy some hobbies/activities outside of work, now that he has shifted to part-time. Developed the following behavioral health goals:    Improve diet by reducing fried food and eating more fish and vegetable. Has been eating more grilled salmon and salads lately.     Quit smoking. Will look into patch and other medications before setting a quit date.     Become more active. Identify physical activity that he can do and enjoys (e.g., biking or roller skating). Has started going on more walks. Recently picked up drumming with a friend who plays the [employer].           Topics Discussed:   1. Today we focused on tobacco cessation. Nico reported that he bought a pack of cigarettes last week and smoked during his birthday and an outing with friends. He plans to quit smoking after this pack is done, which will probably be by tomorrow. We discussed a quit date of Wednesday or Thursday. This provider utilized motivational interviewing strategies to help Nico make a plan for quitting. He stated it is very important for him to quit smoking (10/10 in the importance scale) because as he gets older he notices the effects smoking has on his body. He does not like having shortness of breath, complications with bronchitis, and said that when he smokes he files tired and with lesser mood than before. When he has stopped smoking in the past he reported increased energy and feeling more content. He also  "reported that his children urge him to quit smoking often and he wishes to set a good example. Additionally, Nico noted the cost of buying cigarettes builds up monthly (\"about $80 dls a month, sometimes $160, that's high!\"). Nico reported feeling fairly confident that he could quit smoking (8/10 in the confidence scale) because he has quit in the past successfully. We explored strategies he has used to manage cravings and stop smoking (e.g., patches, distract himself until thought passes, etc.).   2. We briefly discussed increasing activity. Nico reported he often \"pushes\" through the pain and works longer shifts at work, however, he has felt really tired and with worse back pain afterwards. Discussed concept of pacing. Tied this to his plan to start exercising more. We included more information on pacing in the AVS.    Objective: Mr. Bhatt appears to be awake and alert for today's visit.    Assessment:   Mr. Bhatt was referred by Dr. Ortega for a behavioral health evaluation to address chronic pain syndrome. The patient would likely benefit from continued support with psychosocial aspects of pain management.    Stage of change: Preparatory  Diagnosis: chronic pain syndrome    Plan:   1. We did not update the personal care plan today. We reviewed and decided to update at next appointment. Personal Care Plan for Chronic Pain Date: October/2019.  "

## 2019-11-12 NOTE — PROGRESS NOTES
"  Chronic Pain Follow-Up Visit    Pain Update:  Location of pain: back  Analgesia/pain control: Recent changes:  same    Overall control: Tolerable with discomfort    Adherance     How often do you take extra pain medicine:Never    Did you take your pain medication today? YES    Adverse effects: No      Database checked today? Yes. Details: As expected     No flowsheet data found.  No flowsheet data found.    Care Plan discussed and updated as needed.  See the end of this note.     FUNCTIONAL ASSESSMENT QUESTIONNAIRE SCORE 10/22/2019 10/22/2019   Total Score 65 65        Problem, Medication and Allergy Lists were reviewed and are current..           Physical Exam:     Vitals:    11/12/19 1318   BP: (!) 144/79   BP Location: Left arm   Pulse: 93   Resp: 16   Temp: 97.3  F (36.3  C)   TempSrc: Oral   SpO2: 96%   Weight: 106.1 kg (233 lb 12.8 oz)   Height: 1.74 m (5' 8.5\")     Body mass index is 35.03 kg/m .  Vital signs normal except HTN  GENERAL: healthy, alert, well nourished, well hydrated, no distress  HENT: ear canals- normal; TMs- normal; Nose- normal; Mouth- no ulcers, no lesions  NECK: no tenderness, no adenopathy, no asymmetry, no masses, no stiffness; thyroid- normal to palpation  RESP: lungs clear to auscultation - no rales, no rhonchi, no wheezes  CV: regular rates and rhythm, normal S1 S2, no S3 or S4 and no murmur, no click or rub -  ABDOMEN: soft, no tenderness, no  hepatosplenomegaly, no masses, normal bowel sounds        Results:     No results found for any visits on 11/12/19.   rapid urine drug screen obtained today: Yes    Assessment and Plan    Babar Bhatt is here for follow up of chronic pain caused by low back pain.    Babar was seen today for refill request.    Diagnoses and all orders for this visit:    Chronic pain syndrome  -     Rapid Urine Drug Screen ([facility])  -     ORTHOPEDICS ADULT REFERRAL; Future  -     traMADol (ULTRAM) 50 MG tablet; Take 2 tablets (100 mg) by mouth 4 times " daily    Benign essential hypertension    Encounter for smoking cessation counseling  -     nicotine (NICORETTE) 2 MG gum; Place 1 each (2 mg) inside cheek as needed for smoking cessation        1. Chronic Pain Syndrome:  Care plan updated with patient, see below for details.  Patient is being prescribed 400 mg of tramadol per day. 60 mg MEDD  Care Plan Date: October/2019  Naloxone has been prescribed.  Babar is here for chronic pain visit due to low back pain. Pain has been stable, has pain when he works long shifts still. Would like referral for possible steroid injection as he feels he is on too many medications.  checked, as expected.   - Rapid Urine Drug Screen ([facility])  - ORTHOPEDICS ADULT REFERRAL; Future  - traMADol (ULTRAM) 50 MG tablet; Take 2 tablets (100 mg) by mouth 4 times daily  Dispense: 240 tablet; Refill: 0    2. Benign essential hypertension  BP elevated today, has been checking it at home and it has been in the 130s systolic. Would like to try exercise and diet prior to adding another medication.     3. Encounter for smoking cessation counseling  - nicotine (NICORETTE) 2 MG gum; Place 1 each (2 mg) inside cheek as needed for smoking cessation  Dispense: 80 each; Refill: 3    If opioids prescribed patient was asked to bring pill bottle to each appointment and was informed that refills would only be provided at office visits.   Asked patient to F/U in 1 month(s) with same provider for 20 minute  Visit.     I precepted today with Curtis Underwood MD.       Rafi Ortega MD    There are no Patient Instructions on file for this visit.         ADDENDUM     Rapid drug screen positive for cocaine. Sending confirmatory test.     Rafi Ortega, PGY3  [facility]

## 2019-11-14 NOTE — PATIENT INSTRUCTIONS
11/14/19  ORTHOPEDICS ADULT REFERRAL  [facility]  Phone: [phone]  Fax: [phone]    Referral submitted online.    Demographics, referral, office note(s) and radiology reports faxed to [phone], they will contact patient.     Marlen Quintanilla

## 2019-11-16 LAB — BENZOYLECGONINE, URN, QUANT: >1000 NG/ML

## 2019-12-09 NOTE — TELEPHONE ENCOUNTER
Note routed to Dr.Krumme Callie BAEZ  
[facility] phone call message- patient requesting a refill:    Full Medication Name: amLODIPine (NORVASC) 10 MG tablet    Dose: Sig - Route: Take 1 tablet (10 mg) by mouth daily - Oral    Pharmacy confirmed as   [id number] - [facility] [address] [place] [address]  Phone: [phone] Fax: [phone]  : Yes    Additional Comments: Please give pt a call once refill has been complete. Pt is running low on medication and needs refill sent over.       OK to leave a message on voice mail? Yes    Primary language: English      needed? No    Call taken on October 29, 2019 at 4:42 PM by Grisel Flores-Cardona  
Refilled medication.     Rafi Ortega, PGY3  [facility]      
Quality 130: Documentation Of Current Medications In The Medical Record: Current Medications Documented
Quality 431: Preventive Care And Screening: Unhealthy Alcohol Use - Screening: Patient screened for unhealthy alcohol use using a single question and scores less than 2 times per year
Quality 226: Preventive Care And Screening: Tobacco Use: Screening And Cessation Intervention: Patient screened for tobacco use and is an ex/non-smoker
Quality 47: Advance Care Plan: Advance Care Planning discussed and documented in the medical record; patient did not wish or was not able to name a surrogate decision maker or provide an advance care plan.
Detail Level: Detailed

## 2020-04-16 ENCOUNTER — TELEPHONE (OUTPATIENT)
Dept: FAMILY MEDICINE | Facility: CLINIC | Age: 55
End: 2020-04-16

## 2020-04-16 NOTE — TELEPHONE ENCOUNTER
Reached out to patient during COVID19 Clinic outreach. Reassured patient that [facility] is still open and has started implementing phone and video appointments to help patient remain safe at home.     Patient reports the following concerns: n/a    Per patient request, patient is scheduled for a visit to address their concerns on the following date: n/a     Offered MyChart. Patient declined.    Marian Mansfield

## 2021-03-31 ENCOUNTER — TELEPHONE (OUTPATIENT)
Dept: FAMILY MEDICINE | Facility: CLINIC | Age: 56
End: 2021-03-31

## 2021-03-31 NOTE — TELEPHONE ENCOUNTER
Called patient to inform them of upcoming COVID-19 vaccine clinic with [employer] single dose vaccination on Saturday 4/3/21 at this clinic.    Scheduled for 11:45am apt on Saturday 4/3.     MERY Smith

## 2021-04-07 ENCOUNTER — OFFICE VISIT (OUTPATIENT)
Dept: FAMILY MEDICINE | Facility: CLINIC | Age: 56
End: 2021-04-07
Payer: COMMERCIAL

## 2021-04-07 VITALS
TEMPERATURE: 96.9 F | DIASTOLIC BLOOD PRESSURE: 90 MMHG | HEART RATE: 91 BPM | OXYGEN SATURATION: 97 % | BODY MASS INDEX: 37.04 KG/M2 | WEIGHT: 250.1 LBS | SYSTOLIC BLOOD PRESSURE: 152 MMHG | RESPIRATION RATE: 20 BRPM | HEIGHT: 69 IN

## 2021-04-07 DIAGNOSIS — Z11.59 ENCOUNTER FOR HEPATITIS C SCREENING TEST FOR LOW RISK PATIENT: ICD-10-CM

## 2021-04-07 DIAGNOSIS — M47.816 SPONDYLOSIS OF LUMBAR REGION WITHOUT MYELOPATHY OR RADICULOPATHY: Primary | ICD-10-CM

## 2021-04-07 DIAGNOSIS — Z11.4 SCREENING FOR HUMAN IMMUNODEFICIENCY VIRUS WITHOUT PRESENCE OF RISK FACTORS: ICD-10-CM

## 2021-04-07 DIAGNOSIS — E66.01 MORBID OBESITY (H): ICD-10-CM

## 2021-04-07 DIAGNOSIS — I10 BENIGN ESSENTIAL HYPERTENSION: ICD-10-CM

## 2021-04-07 DIAGNOSIS — G89.4 CHRONIC PAIN SYNDROME: ICD-10-CM

## 2021-04-07 LAB
AMPHETAMINES UR QL: NOT DETECTED NG/ML
ANION GAP SERPL CALCULATED.3IONS-SCNC: 6 MMOL/L (ref 3–14)
BARBITURATES UR QL SCN: NOT DETECTED NG/ML
BENZODIAZ UR QL SCN: NOT DETECTED NG/ML
BUN SERPL-MCNC: 15 MG/DL (ref 7–30)
BUPRENORPHINE UR QL: NOT DETECTED NG/ML
CALCIUM SERPL-MCNC: 9 MG/DL (ref 8.5–10.1)
CANNABINOIDS UR QL: ABNORMAL NG/ML
CHLORIDE SERPL-SCNC: 106 MMOL/L (ref 94–109)
CHOLEST SERPL-MCNC: 210 MG/DL
CO2 SERPL-SCNC: 29 MMOL/L (ref 20–32)
COCAINE UR QL SCN: NOT DETECTED NG/ML
CREAT SERPL-MCNC: 0.9 MG/DL (ref 0.66–1.25)
D-METHAMPHET UR QL: NOT DETECTED NG/ML
GFR SERPL CREATININE-BSD FRML MDRD: >90 ML/MIN/{1.73_M2}
GLUCOSE SERPL-MCNC: 110 MG/DL (ref 70–99)
HBA1C MFR BLD: 4.8 % (ref 0–5.6)
HDLC SERPL-MCNC: 88 MG/DL
LDLC SERPL CALC-MCNC: 105 MG/DL
METHADONE UR QL SCN: NOT DETECTED NG/ML
NONHDLC SERPL-MCNC: 122 MG/DL
OPIATES UR QL SCN: NOT DETECTED NG/ML
OXYCODONE UR QL SCN: NOT DETECTED NG/ML
PCP UR QL SCN: NOT DETECTED NG/ML
POTASSIUM SERPL-SCNC: 4 MMOL/L (ref 3.4–5.3)
PROPOXYPH UR QL: NOT DETECTED NG/ML
SODIUM SERPL-SCNC: 141 MMOL/L (ref 133–144)
TRICYCLICS UR QL SCN: NOT DETECTED NG/ML
TRIGL SERPL-MCNC: 85 MG/DL

## 2021-04-07 PROCEDURE — 99214 OFFICE O/P EST MOD 30 MIN: CPT | Performed by: FAMILY MEDICINE

## 2021-04-07 PROCEDURE — 80048 BASIC METABOLIC PNL TOTAL CA: CPT | Performed by: FAMILY MEDICINE

## 2021-04-07 PROCEDURE — 80306 DRUG TEST PRSMV INSTRMNT: CPT | Performed by: FAMILY MEDICINE

## 2021-04-07 PROCEDURE — 87389 HIV-1 AG W/HIV-1&-2 AB AG IA: CPT | Performed by: FAMILY MEDICINE

## 2021-04-07 PROCEDURE — 36415 COLL VENOUS BLD VENIPUNCTURE: CPT | Performed by: FAMILY MEDICINE

## 2021-04-07 PROCEDURE — 80061 LIPID PANEL: CPT | Performed by: FAMILY MEDICINE

## 2021-04-07 PROCEDURE — 83036 HEMOGLOBIN GLYCOSYLATED A1C: CPT | Performed by: FAMILY MEDICINE

## 2021-04-07 PROCEDURE — 86803 HEPATITIS C AB TEST: CPT | Performed by: FAMILY MEDICINE

## 2021-04-07 RX ORDER — TRIAMTERENE AND HYDROCHLOROTHIAZIDE 75; 50 MG/1; MG/1
1 TABLET ORAL DAILY
Qty: 90 TABLET | Refills: 0 | Status: SHIPPED | OUTPATIENT
Start: 2021-04-07 | End: 2021-07-05

## 2021-04-07 RX ORDER — GABAPENTIN 300 MG/1
300 CAPSULE ORAL 3 TIMES DAILY
Qty: 90 CAPSULE | Refills: 0 | Status: SHIPPED | OUTPATIENT
Start: 2021-04-07 | End: 2021-05-05

## 2021-04-07 RX ORDER — BUPROPION HYDROCHLORIDE 300 MG/1
TABLET ORAL
COMMUNITY

## 2021-04-07 RX ORDER — CITALOPRAM HYDROBROMIDE 40 MG/1
TABLET ORAL
COMMUNITY
Start: 2020-05-08 | End: 2023-01-09

## 2021-04-07 RX ORDER — ZOLPIDEM TARTRATE 5 MG/1
5 TABLET ORAL
COMMUNITY
End: 2023-01-01

## 2021-04-07 ASSESSMENT — MIFFLIN-ST. JEOR: SCORE: 1946.89

## 2021-04-07 NOTE — PROGRESS NOTES
Assessment & Plan   1. Spondylosis of lumbar region without myelopathy or radiculopathy  Assessment: history and clinical examination are consistent with spondylosis of the lumbar spine. The patient denies any weight loss, urinary or bowel changes.   Plan:  - he was started on gabapentin today.   - advised to schedule a visit if pain fails to improve.   - Drug Abuse Screen Panel 13, Urine (Care Map)  - **Basic metabolic panel FUTURE anytime  - gabapentin (NEURONTIN) 300 MG capsule; Take 1 capsule (300 mg) by mouth 3 times daily  Dispense: 90 capsule; Refill: 0    2. Chronic pain syndrome  - Drug Abuse Screen Panel 13, Urine (Care Map)  - **Basic metabolic panel FUTURE anytime    3. Benign essential hypertension  - triamterene-HCTZ (MAXZIDE) 75-50 MG tablet; Take 1 tablet by mouth daily  Dispense: 90 tablet; Refill: 0  - **Basic metabolic panel FUTURE anytime  - Hemoglobin A1c  - Lipid panel reflex to direct LDL Fasting    4. Morbid obesity (H)  - **Basic metabolic panel FUTURE anytime  - Hemoglobin A1c  - Lipid panel reflex to direct LDL Fasting    5. Screening for human immunodeficiency virus without presence of risk factors  - HIV Antigen Antibody Combo    6. Encounter for hepatitis C screening test for low risk patient  - Hepatitis C antibody    Return in about 1 week (around 4/14/2021) for Follow-up visit.    Nabila Newby MD  [facility]    Margo Eckert is a 55 year old who presents for the following health issues     HPI     Chronic/Recurring Back Pain Follow Up      Where is your back pain located? (Select all that apply) low back center    How would you describe your back pain?  burning and sharp    Where does your back pain spread? the right and left buttock and the right and left  thigh    Since your last clinic visit for back pain, how has your pain changed? gradually worsening    Does your back pain interfere with your job? YES    Since your last visit, have you tried any  "new treatment? No    Worsening pain for the past few months.   Pain starts 15 minutes after standing or walking.  Pain radiates from the back to bilateral legs.   Pain is on the scale of 10/10.  Tingling and burning is always there but worse with walking and standing.   Sitting and bending helps with the pain.   Pain has been present since 2018.  The patient tried ibuprofen and acetaminophen without improvement in his pain.   Pain is worse because of weight gain recently.     Hypertension Follow-up      Do you check your blood pressure regularly outside of the clinic? No     Are you following a low salt diet? Yes    Are your blood pressures ever more than 140 on the top number (systolic) OR more   than 90 on the bottom number (diastolic), for example 140/90? unknown      How many servings of fruits and vegetables do you eat daily?  0-1    On average, how many sweetened beverages do you drink each day (Examples: soda, juice, sweet tea, etc.  Do NOT count diet or artificially sweetened beverages)?   1    How many days per week do you exercise enough to make your heart beat faster? 3 or less    How many minutes a day do you exercise enough to make your heart beat faster? 9 or less  How many days per week do you miss taking your medication? 1    What makes it hard for you to take your medications?  remembering to take     Smoking one pack every 3 days.       Review of Systems   Constitutional, HEENT, cardiovascular, pulmonary, gi and gu systems are negative, except as otherwise noted.      Objective    BP (!) 152/90 (Patient Position: Sitting, Cuff Size: Adult Large)   Pulse 91   Temp 96.9  F (36.1  C) (Tympanic)   Resp 20   Ht 1.74 m (5' 8.5\")   Wt 113.4 kg (250 lb 1.6 oz)   SpO2 97%   BMI 37.47 kg/m    Body mass index is 37.47 kg/m .  Physical Exam   GENERAL: healthy, alert and no distress  NECK: no adenopathy, no asymmetry, masses, or scars and thyroid normal to palpation  RESP: lungs clear to auscultation - " no rales, rhonchi or wheezes  CV: regular rate and rhythm, normal S1 S2, no S3 or S4, no murmur, click or rub, no peripheral edema and peripheral pulses strong  ABDOMEN: soft, nontender, no hepatosplenomegaly, no masses and bowel sounds normal  Comprehensive back pain exam:  No tenderness, Range of motion not limited by pain, Lower extremity strength functional and equal on both sides, Lower extremity reflexes within normal limits bilaterally, Lower extremity sensation normal and equal on both sides and Straight leg positive on  left, indicating possible ipsilateral radiculopathy    Results for orders placed or performed in visit on 04/07/21   Drug Abuse Screen Panel 13, Urine (Care Map)     Status: Abnormal   Result Value Ref Range    Cannabinoids (96-olk-1-carboxy-9-THC) Detected, Abnormal Result (A) NDET^Not Detected ng/mL    Phencyclidine (Phencyclidine) Not Detected NDET^Not Detected ng/mL    Cocaine (Benzoylecgonine) Not Detected NDET^Not Detected ng/mL    Methamphetamine (d-Methamphetamine) Not Detected NDET^Not Detected ng/mL    Opiates (Morphine) Not Detected NDET^Not Detected ng/mL    Amphetamine (d-Amphetamine) Not Detected NDET^Not Detected ng/mL    Benzodiazepines (Nordiazepam) Not Detected NDET^Not Detected ng/mL    Tricyclic Antidepressants (Desipramine) Not Detected NDET^Not Detected ng/mL    Methadone (Methadone) Not Detected NDET^Not Detected ng/mL    Barbiturates (Butalbital) Not Detected NDET^Not Detected ng/mL    Oxycodone (Oxycodone) Not Detected NDET^Not Detected ng/mL    Propoxyphene (Norpropoxyphene) Not Detected NDET^Not Detected ng/mL    Buprenorphine (Buprenorphine) Not Detected NDET^Not Detected ng/mL   **Basic metabolic panel FUTURE anytime     Status: Abnormal   Result Value Ref Range    Sodium 141 133 - 144 mmol/L    Potassium 4.0 3.4 - 5.3 mmol/L    Chloride 106 94 - 109 mmol/L    Carbon Dioxide 29 20 - 32 mmol/L    Anion Gap 6 3 - 14 mmol/L    Glucose 110 (H) 70 - 99 mg/dL    Urea  Nitrogen 15 7 - 30 mg/dL    Creatinine 0.90 0.66 - 1.25 mg/dL    GFR Estimate >90 >60 mL/min/[1.73_m2]    GFR Estimate If Black >90 >60 mL/min/[1.73_m2]    Calcium 9.0 8.5 - 10.1 mg/dL   Hemoglobin A1c     Status: None   Result Value Ref Range    Hemoglobin A1C 4.8 0 - 5.6 %   Lipid panel reflex to direct LDL Fasting     Status: Abnormal   Result Value Ref Range    Cholesterol 210 (H) <200 mg/dL    Triglycerides 85 <150 mg/dL    HDL Cholesterol 88 >39 mg/dL    LDL Cholesterol Calculated 105 (H) <100 mg/dL    Non HDL Cholesterol 122 <130 mg/dL   HIV Antigen Antibody Combo     Status: None   Result Value Ref Range    HIV Antigen Antibody Combo Nonreactive NR^Nonreactive       Hepatitis C antibody     Status: None   Result Value Ref Range    Hepatitis C Antibody Nonreactive NR^Nonreactive

## 2021-04-07 NOTE — LETTER
Opioid / Opioid Plus Controlled Substance Agreement    This is an agreement between you and your provider about the safe and appropriate use of controlled substance/opioids prescribed by your care team. Controlled substances are medicines that can cause physical and mental dependence (abuse).    There are strict laws about having and using these medicines. We here at [facility] are committing to working with you in your efforts to get better. To support you in this work, we ll help you schedule regular office appointments for medicine refills. If we must cancel or change your appointment for any reason, we ll make sure you have enough medicine to last until your next appointment.     As a Provider, I will:    Listen carefully to your concerns and treat you with respect.     Recommend a treatment plan that I believe is in your best interest. This plan may involve therapies other than opioid pain medication.     Talk with you often about the possible benefits, and the risk of harm of any medicine that we prescribe for you.     Provide a plan on how to taper (discontinue or go off) using this medicine if the decision is made to stop its use.    As a Patient, I understand that opioid(s):     Are a controlled substance prescribed by my care team to help me function or work and manage my condition(s).     Are strong medicines and can cause serious side effects such as:    Drowsiness, which can seriously affect my driving ability    A lower breathing rate, enough to cause death    Harm to my thinking ability     Depression     Abuse of and addiction to this medicine    Need to be taken exactly as prescribed. Combining opioids with certain medicines or chemicals (such as illegal drugs, sedatives, sleeping pills, and benzodiazepines) can be dangerous or even fatal. If I stop opioids suddenly, I may have severe withdrawal symptoms.    Do not work for all types of pain nor for all patients. If they re not helpful, I may  be asked to stop them.    The risks, benefits and side effects of these medicine(s) were explained to me. I agree that:  1. I will take part in other treatments as advised by my care team. This may be psychiatry or counseling, physical therapy, behavioral therapy, group treatment or a referral to a specialist.     2. I will keep all my appointments. I understand that this is part of the monitoring of opioids. My care team may require an office visit for EVERY opioid/controlled substance refill. If I miss appointments or don t follow instructions, my care team may stop my medicine.    3. I will take my medicines as prescribed. I will not change the dose or schedule unless my care team tells me to. There will be no refills if I run out early.     4. I may be asked to come to the clinic and complete a urine drug test or complete a pill count at any time. If I don t give a urine sample or participate in a pill count, the care team may stop my medicine.    5. I will only receive prescriptions from this clinic for chronic pain. If I am treated by another provider for acute pain issues, I will tell them that I am taking opioid pain medication for chronic pain and that I have a treatment agreement with this provider. I will inform my [facility] care team within one business day if I am given a prescription for any pain medication by another healthcare provider. My [facility] care team can contact other providers and pharmacists about my use of any medicines.    6. It is up to me to make sure that I don t run out of my medicines on weekends or holidays. If my care team is willing to refill my opioid prescription without a visit, I must request refills only during office hours. Refills may take up to 3 business days to process. I will use one pharmacy to fill all my opioid and other controlled substance prescriptions. I will notify the clinic about any changes to my insurance or medication availability.    7. I  am responsible for my prescriptions. If the medicine/prescription is lost, stolen or destroyed, it will not be replaced. I also agree not to share controlled substance medicines with anyone.    8. I am aware I should not use any illegal or recreational drugs. I agree not to drink alcohol unless my care team says I can.       9. If I enroll in the [place] [facility] Cannabis program, I will tell my care team prior to my next refill.     10. I will tell my care team right away if I become pregnant, have a new medical problem treated outside of my regular clinic, or have a change in my medications.    11. I understand that this medicine can affect my thinking, judgment and reaction time. Alcohol and drugs affect the brain and body, which can affect the safety of my driving. Being under the influence of alcohol or drugs can affect my decision-making, behaviors, personal safety, and the safety of others. Driving while impaired (DWI) can occur if a person is driving, operating, or in physical control of a car, motorcycle, boat, snowmobile, ATV, motorbike, off-road vehicle, or any other motor vehicle ([place] Statute 169A.20). I understand the risk if I choose to drive or operate any vehicle or machinery.    I understand that if I do not follow any of the conditions above, my prescriptions or treatment may be stopped or changed.          Opioids  What You Need to Know    What are opioids?   Opioids are pain medicines that must be prescribed by a doctor. They are also known as narcotics.     Examples are:   1. morphine (MS Contin, Kinza)  2. oxycodone (Oxycontin)  3. oxycodone and acetaminophen (Percocet)  4. hydrocodone and acetaminophen (Vicodin, Norco)   5. fentanyl patch (Duragesic)   6. hydromorphone (Dilaudid)   7. methadone  8. codeine (Tylenol #3)     What do opioids do well?   Opioids are best for severe short-term pain such as after a surgery or injury. They may work well for cancer pain. They may help some people with  long-lasting (chronic) pain.     What do opioids NOT do well?   Opioids never get rid of pain entirely, and they don t work well for most patients with chronic pain. Opioids don t reduce swelling, one of the causes of pain.                                    Other ways to manage chronic pain and improve function include:       Treat the health problem that may be causing pain    Anti-inflammation medicines, which reduce swelling and tenderness, such as ibuprofen (Advil, Motrin) or naproxen (Aleve)    Acetaminophen (Tylenol)    Antidepressants and anti-seizure medicines, especially for nerve pain    Topical treatments such as patches or creams    Injections or nerve blocks    Chiropractic or osteopathic treatment    Acupuncture, massage, deep breathing, meditation, visual imagery, aromatherapy    Use heat or ice at the pain site    Physical therapy     Exercise    Stop smoking    Take part in therapy       Risks and side effects     Talk to your doctor before you start or decide to keep taking opioids. Possible side effects include:      Lowering your breathing rate enough to cause death    Overdose, including death, especially if taking higher than prescribed doses    Worse depression symptoms; less pleasure in things you usually enjoy    Feeling tired or sluggish    Slower thoughts or cloudy thinking    Being more sensitive to pain over time; pain is harder to control    Trouble sleeping or restless sleep    Changes in hormone levels (for example, less testosterone)    Changes in sex drive or ability to have sex    Constipation    Unsafe driving    Itching and sweating    Dizziness    Nausea, throwing up and dry mouth    What else should I know about opioids?    Opioids may lead to dependence, tolerance, or addiction.      Dependence means that if you stop or reduce the medicine too quickly, you will have withdrawal symptoms. These include loose poop (diarrhea), jitters, flu-like symptoms, nervousness and tremors.  Dependence is not the same as addiction.                       Tolerance means needing higher doses over time to get the same effect. This may increase the chance of serious side effects.      Addiction is when people improperly use a substance that harms their body, their mind or their relations with others. Use of opiates can cause a relapse of addiction if you have a history of drug or alcohol abuse.      People who have used opioids for a long time may have a lower quality of life, worse depression, higher levels of pain and more visits to doctors.    You can overdose on opioids. Take these steps to lower your risk of overdose:    1. Recognize the signs:  Signs of overdose include decrease or loss of consciousness (blackout), slowed breathing, trouble waking up and blue lips. If someone is worried about overdose, they should call 911.    2. Talk to your doctor about Narcan (naloxone).   If you are at risk for overdose, you may be given a prescription for Narcan. This medicine very quickly reverses the effects of opioids.   If you overdose, a friend or family member can give you Narcan while waiting for the ambulance. They need to know the signs of overdose and how to give Narcan.     3. Don't use alcohol or street drugs.   Taking them with opioids can cause death.    4. Do not take any of these medicines unless your doctor says it s OK. Taking these with opioids can cause death:    Benzodiazepines, such as lorazepam (Ativan), alprazolam (Xanax) or diazepam (Valium)    Muscle relaxers, such as cyclobenzaprine (Flexeril)    Sleeping pills like zolpidem (Ambien)     Other opioids      How to keep you and other people safe while taking opioids:    1. Never share your opioids with others.  Opioid medicines are regulated by the Drug Enforcement Agency (HEATHER). Selling or sharing medications is a criminal act.    2. Be sure to store opioids in a secure place, locked up if possible. Young children can easily swallow them  and overdose.    3. When you are traveling with your medicines, keep them in the original bottles. If you use a pill box, be sure you also carry a copy of your medicine list from your clinic or pharmacy.    4. Safe disposal of opioids    Most pharmacies have places to get rid of medicine, called disposal kiosks. Medicine disposal options are also available in every [facility]. Search your county and  medication disposal  to find more options. You can find more details at:  https://www.pca.[email or web address].[email or web address]./living-green/managing-[email or web address]-medications     I agree that my provider, clinic care team, and pharmacy may work with any city, state or federal law enforcement agency that investigates the misuse, sale, or other diversion of my controlled medicine. I will allow my provider to discuss my care with, or share a copy of, this agreement with any other treating provider, pharmacy or emergency room where I receive care.    I have read this agreement and have asked questions about anything I did not understand.    _______________________________________________________  Patient Signature - Babar Bhatt _____________________                   Date     _______________________________________________________  Provider Signature - Nabila Newby MD   _____________________                   Date     _______________________________________________________  Witness Signature (required if provider not present while patient signing)   _____________________                   Date

## 2021-04-08 LAB
HCV AB SERPL QL IA: NONREACTIVE
HIV 1+2 AB+HIV1 P24 AG SERPL QL IA: NONREACTIVE

## 2021-04-12 ENCOUNTER — NURSE TRIAGE (OUTPATIENT)
Dept: NURSING | Facility: CLINIC | Age: 56
End: 2021-04-12

## 2021-04-12 DIAGNOSIS — E78.5 HYPERLIPIDEMIA WITH TARGET LDL LESS THAN 100: Primary | ICD-10-CM

## 2021-04-12 NOTE — RESULT ENCOUNTER NOTE
Called the patient at 6:20 pm on 4/12/21 to discuss lab results. Patient did no . I left him a voice message asking him to call us back.   If he calls back please inform him the following.   Dear Babar  Here are your recent results. LDL(bad) cholesterol level is elevated which can increase your heart disease risk.  A diet high in fat and simple carbohydrates, genetics and being overweight can contribute to this. ADVISE: exercising 150 minutes of aerobic exercise per week (30 minutes for 5 days per week or 50 minutes for 3 days per week are options) and eating a low saturated fat/low carbohydrate diet are helpful to improve this. Current guidelines from the American Heart Association support starting a cholesterol lowering medication to lower your heart and stroke disease risk. I would like to send a prescription to your pharmacy. Please let us know if you are okay with it.     I would also recommend rechecking your fasting cholesterol panel by scheduling a lab-only appointment in 3 months.    Best Regards  Nabila Newby MD      The 10-year ASCVD risk score (Radhajennifer STANTON Jr., et al., 2013) is: 9.7%    Values used to calculate the score:      Age: 55 years      Sex: Male      Is Non- : No      Diabetic: No      Tobacco smoker: Yes      Systolic Blood Pressure: 152 mmHg      Is BP treated: No      HDL Cholesterol: 88 mg/dL      Total Cholesterol: 210 mg/dL    Nabila Newby MD

## 2021-04-12 NOTE — TELEPHONE ENCOUNTER
Returning PCP call - reviewed provider note with patient.  Patient would like to start cholesterol medication.  Pharmacy: [facility] on [address] on 4/12/2021 at 6:41 PM    Reason for Disposition    Caller requesting lab results    Additional Information    Negative: Lab calling with strep throat test results and triager can call in prescription    Negative: Lab calling with urinalysis test results and triager can call in prescription    Negative: Medication questions    Negative: ED call to PCP    Negative: Physician call to PCP    Negative: Call about patient who is currently hospitalized    Negative: Lab or radiology calling with CRITICAL test results    Negative: [1] Prescription not at pharmacy AND [2] was prescribed today by PCP    Negative: [1] Follow-up call from patient regarding patient's clinical status AND [2] information urgent    Negative: [1] Caller requests to speak ONLY to PCP AND [2] urgent question    Negative: [1] Caller requests to speak to PCP now AND [2] won't tell us reason for call  (Exception: if 10 pm to 6 am, caller must first discuss reason for the call)    Negative: Notification of hospital admission    Negative: Notification of death    Protocols used: [facility]

## 2021-04-13 RX ORDER — ROSUVASTATIN CALCIUM 20 MG/1
20 TABLET, COATED ORAL DAILY
Qty: 90 TABLET | Refills: 1 | Status: SHIPPED | OUTPATIENT
Start: 2021-04-13 | End: 2021-08-04

## 2021-04-28 ENCOUNTER — ANCILLARY PROCEDURE (OUTPATIENT)
Dept: GENERAL RADIOLOGY | Facility: CLINIC | Age: 56
End: 2021-04-28
Attending: FAMILY MEDICINE
Payer: COMMERCIAL

## 2021-04-28 ENCOUNTER — OFFICE VISIT (OUTPATIENT)
Dept: FAMILY MEDICINE | Facility: CLINIC | Age: 56
End: 2021-04-28
Payer: COMMERCIAL

## 2021-04-28 VITALS
HEART RATE: 93 BPM | HEIGHT: 69 IN | WEIGHT: 245 LBS | SYSTOLIC BLOOD PRESSURE: 130 MMHG | TEMPERATURE: 97 F | BODY MASS INDEX: 36.29 KG/M2 | DIASTOLIC BLOOD PRESSURE: 87 MMHG | RESPIRATION RATE: 16 BRPM | OXYGEN SATURATION: 96 %

## 2021-04-28 DIAGNOSIS — M43.17 SPONDYLOLISTHESIS OF LUMBOSACRAL REGION: ICD-10-CM

## 2021-04-28 DIAGNOSIS — J44.9 CHRONIC OBSTRUCTIVE PULMONARY DISEASE, UNSPECIFIED COPD TYPE (H): Primary | ICD-10-CM

## 2021-04-28 DIAGNOSIS — Z12.11 ENCOUNTER FOR SCREENING FOR MALIGNANT NEOPLASM OF COLON: ICD-10-CM

## 2021-04-28 DIAGNOSIS — Z87.891 PERSONAL HISTORY OF TOBACCO USE: ICD-10-CM

## 2021-04-28 PROCEDURE — 99406 BEHAV CHNG SMOKING 3-10 MIN: CPT | Performed by: FAMILY MEDICINE

## 2021-04-28 PROCEDURE — 72100 X-RAY EXAM L-S SPINE 2/3 VWS: CPT | Mod: FY | Performed by: RADIOLOGY

## 2021-04-28 PROCEDURE — 99214 OFFICE O/P EST MOD 30 MIN: CPT | Performed by: FAMILY MEDICINE

## 2021-04-28 RX ORDER — ALBUTEROL SULFATE 90 UG/1
2 AEROSOL, METERED RESPIRATORY (INHALATION) EVERY 6 HOURS
Qty: 6.7 G | Refills: 3 | Status: SHIPPED | OUTPATIENT
Start: 2021-04-28 | End: 2021-08-06

## 2021-04-28 RX ORDER — ALBUTEROL SULFATE 1.25 MG/3ML
1.25 SOLUTION RESPIRATORY (INHALATION) EVERY 6 HOURS PRN
Qty: 3 ML | Refills: 0 | Status: SHIPPED | OUTPATIENT
Start: 2021-04-28 | End: 2022-05-17

## 2021-04-28 RX ORDER — FLUTICASONE PROPIONATE 110 UG/1
1 AEROSOL, METERED RESPIRATORY (INHALATION) 2 TIMES DAILY
Qty: 12 G | Refills: 0 | Status: SHIPPED | OUTPATIENT
Start: 2021-04-28 | End: 2021-06-03

## 2021-04-28 RX ORDER — ALBUTEROL SULFATE 90 UG/1
2 AEROSOL, METERED RESPIRATORY (INHALATION) ONCE
Status: CANCELLED | OUTPATIENT
Start: 2021-04-28 | End: 2021-04-28

## 2021-04-28 ASSESSMENT — MIFFLIN-ST. JEOR: SCORE: 1923.75

## 2021-04-28 NOTE — PATIENT INSTRUCTIONS
"  You can call to schedule radiology tests at the following numbers:    [facility] (including mammograms, ultrasound, CT and MRI scans and Dexa Scans)    Call   Toll Free     [facility] (including mammograms, ultrasound, CT and MRI scans and Dexa Scans)    Call   Toll Free     Nicotine Gum (Nicorette, polacrilex nicotine gum)      Dosing:    >25 cigarettes per day Dose   Weeks 1-6 Chew 1 piece of 4 mg gum every 1-2 hours. Maximum of 24 pieces a day.   Weeks 7-9 Chew 1 piece of 4 mg gum every 2-4 hours. Maximum of 24 pieces a day.   Weeks 10-12 Chew 1 piece of 4 mg gum every 4-8 hours. Maximum of 24 pieces a day.   < 25 cigarettes per day    Weeks 1-6 Chew 1 piece of 2 mg gum every 1-2 hours. Maximum of 24 pieces a day.   Weeks 7-9 Chew 1 piece of 2 mg gum every 2-4 hours. Maximum of 24 pieces a day.   Weeks 10-12 Chew 1 piece of 2 mg gum every 4-8 hours. Maximum of 24 pieces a day.     How to use nicotine gum:    Chew the gum slowly until you notice a tingly feeling or peppery taste.     Then \"park\" the gum between your teeth and your cheek and let it sit there until you don't notice the tingly feeling or taste anymore.     Chew the gum slowly again until you notice the tingly feeling or peppery taste again and \"park\" the gum on the other side of your mouth.     Repeat this process for 30 minutes, then throw the gum away.     Especially at the beginning, use the gum whenever you would normally smoke a cigarette.    Some tips:    Do not smoke while you are using nicotine gum.     Do not swallow the gum.     Do not chew the gum like normal gum--you will swallow the nicotine instead of absorbing it. You are also more likely to get indigestion or nausea.     Do not drink anything, including water, while you are chewing gum.     Avoid acidic drinks like coffee and pop right before chewing the gum.     As your body becomes less " dependent on nicotine, you will need to chew less gum.     Follow up with your health care provider if you have any questions and also to help taper your nicotine gum dose.    Side Effects:  Some people may experience some indigestion or nausea. Using proper chewing technique may help. If you experience any other troublesome or unusual side effects, call your health care provider.    Lung Cancer Screening   Frequently Asked Questions  If you are at high-risk for lung cancer, getting screened with low-dose computed tomography (LDCT) every year can help save your life. This handout offers answers to some of the most common questions about lung cancer screening. If you have other questions, please call [phone] ([phone]).     What is it?  Lung cancer screening uses special X-ray technology to create an image of your lung tissue. The exam is quick and easy and takes less than 10 seconds. We don t give you any medicine or use any needles. You can eat before and after the exam. You don t need to change your clothes as long as the clothing on your chest doesn t contain metal. But, you do need to be able to hold your breath for at least 6 seconds during the exam.    What is the goal of lung cancer screening?  The goal of lung cancer screening is to save lives. Many times, lung cancer is not found until a person starts having physical symptoms. Lung cancer screening can help detect lung cancer in the earliest stages when it may be easier to treat.    Who should be screened for lung cancer?  We suggest lung cancer screening for anyone who is at high-risk for lung cancer. You are in the high-risk group if you:      are between the ages of 55 and 79, and    have smoked at least 1 pack of cigarettes a day for 30 or more years, and    still smoke or have quit within the past 15 years.    However, if you have a new cough or shortness of breath, you should talk to your doctor before being screened.    Some national  lung health advocacy groups also recommend screening for people ages 50 to 79 who have smoked an average of 1 pack of cigarettes a day for 20 years. They must also have at least 1 other risk factor for lung cancer, not including exposure to secondhand smoke. Other risk factors are having had cancer in the past, emphysema, pulmonary fibrosis, COPD, a family history of lung cancer, or exposure to certain materials such as arsenic, asbestos, beryllium, cadmium, chromium, diesel fumes, nickel, radon or silica. Your care team can help you know if you have one of these risk factors.     Why does it matter if I have symptoms?  Certain symptoms can be a sign that you have a condition in your lungs that should be checked and treated by your doctor. These symptoms include fever, chest pain, a new or changing cough, shortness of breath that you have never felt before, coughing up blood or unexplained weight loss. Having any of these symptoms can greatly affect the results of lung cancer screening.       Should all smokers get an LDCT lung cancer screening exam?  It depends. Lung cancer screening is for a very specific group of men and women who have a history of heavy smoking over a long period of time (see  Who should be screened for lung cancer  above).  I am in the high-risk group, but have been diagnosed with cancer in the past. Is LDCT lung cancer screening right for me?  In some cases, you should not have LDCT lung screening, such as when your doctor is already following your cancer with CT scan studies. Your doctor will help you decide if LDCT lung screening is right for you.  Do I need to have a screening exam every year?  Yes. If you are in the high-risk group described earlier, you should get an LDCT lung cancer screening exam every year until you are 79, or are no longer willing or able to undergo screening and possible procedures to diagnose and treat lung cancer.  How effective is LDCT at preventing death from  lung cancer?  Studies have shown that LDCT lung cancer screening can lower the risk of death from lung cancer by 20 percent in people who are at high-risk.  What are the risks?  There are some risks and limitations of LDCT lung cancer screening. We want to make sure you understand the risks and benefits, so please let us know if you have any questions. Your doctor may want to talk with you more about these risks.    Radiation exposure: As with any exam that uses radiation, there is a very small increased risk of cancer. The amount of radiation in LDCT is small--about the same amount a person would get from a mammogram. Your doctor orders the exam when he or she feels the potential benefits outweigh the risks.    False negatives: No test is perfect, including LDCT. It is possible that you may have a medical condition, including lung cancer, that is not found during your exam. This is called a false negative result.    False positives and more testing: LDCT very often finds something in the lung that could be cancer, but in fact is not. This is called a false positive result. False positive tests often cause anxiety. To make sure these findings are not cancer, you may need to have more tests. These tests will be done only if you give us permission. Sometimes patients need a treatment that can have side effects, such as a biopsy. For more information on false positives, see  What can I expect from the results?     Findings not related to lung cancer: Your LDCT exam also takes pictures of areas of your body next to your lungs. In a very small number of cases, the CT scan will show an abnormal finding in one of these areas, such as your kidneys, adrenal glands, liver or thyroid. This finding may not be serious, but you may need more tests. Your doctor can help you decide what other tests you may need, if any.  What can I expect from the results?  About 1 out of 4 LDCT exams will find something that may need more tests.  Most of the time, these findings are lung nodules. Lung nodules are very small collections of tissue in the lung. These nodules are very common, and the vast majority--more than 97 percent--are not cancer (benign). Most are normal lymph nodes or small areas of scarring from past infections.  But, if a small lung nodule is found to be cancer, the cancer can be cured more than 90 percent of the time. To know if the nodule is cancer, we may need to get more images before your next yearly screening exam. If the nodule has suspicious features (for example, it is large, has an odd shape or grows over time), we will refer you to a specialist for further testing.  Will my doctor also get the results?  Yes. Your doctor will get a copy of your results.  Is it okay to keep smoking now that there s a cancer screening exam?  No. Tobacco is one of the strongest cancer-causing agents. It causes not only lung cancer, but other cancers and cardiovascular (heart) diseases as well. The damage caused by smoking builds over time. This means that the longer you smoke, the higher your risk of disease. While it is never too late to quit, the sooner you quit, the better.  Where can I find help to quit smoking?  The best way to prevent lung cancer is to stop smoking. If you have already quit smoking, congratulations and keep it up! For help on quitting smoking, please call [facility] at 4-523-131-YNNG ([phone]) or the American Cancer Society at 1-310.730.8499 to find local resources near you.  One-on-one health coaching:  If you d prefer to work individually with a health care provider on tobacco cessation, we offer:      Medication Therapy Management:  Our specially trained pharmacists work closely with you and your doctor to help you quit smoking.  Call [phone] or [phone] (toll free).     Can Do: Health coaching offered by [facility].  [email or web address]

## 2021-04-28 NOTE — PROGRESS NOTES
Assessment & Plan     Chronic obstructive pulmonary disease, unspecified COPD type (H)  Patient has a long history of smoking.  History of COPD diagnosed in 2017.  I restarted his Flovent inhaler.  - albuterol (PROAIR HFA/PROVENTIL HFA/VENTOLIN HFA) 108 (90 Base) MCG/ACT inhaler; Inhale 2 puffs into the lungs every 6 hours  - albuterol (ACCUNEB) 1.25 MG/3ML neb solution; Take 1 vial (1.25 mg) by nebulization every 6 hours as needed for shortness of breath / dyspnea or wheezing  - fluticasone (FLOVENT HFA) 110 MCG/ACT inhaler; Inhale 1 puff into the lungs 2 times daily  -Spirometry testing in the future.      Personal history of tobacco use  - Prof fee: Shared Decisionmaking for Lung Cancer Screening  - CT Chest Lung Cancer Scrn Low Dose wo; Future  - nicotine (NICORETTE) 2 MG gum; Place 1 each (2 mg) inside cheek as needed for smoking cessation Place 1 each inside cheek as needed for smoking cessation.    Spondylolisthesis of lumbosacral region  Clinical examination today is consistent with lumbosacral spine strain versus spondylolisthesis.  Due to the presence of persistent neurological symptoms for greater than 6 weeks, I recommended an MRI of his lumbosacral spine.  - XR Lumbar Spine 2/3 Views; Future  - MR Lumbar Spine w/o Contrast; Future    Encounter for screening for malignant neoplasm of colon  Patient desires to proceed with a colonoscopy.  - GASTROENTEROLOGY ADULT REF PROCEDURE ONLY; Future    Return in about 3 months (around 7/28/2021) for Follow-up visit.    Nabila Newby MD  [facility]    Margo Eckert is a 55 year old who presents for the following health issues:    HPI   55-year-old who presents to the clinic to address multiple concerns.  He reports intermittent episodes of dry cough.  He also reports a wet cough in the morning.  His medical history significant for COPD diagnosed in 2017 by another provider.  He was using albuterol inhalers to control symptoms but  "currently uses an albuterol inhaler only.    He denies any chest pain.  Denies any palpitations.    Also, he continues to have persistent lower back pain.  Pain radiates to bilateral lower extremities.  He denies any changes to his urine or stools.  Denies any recent trauma.    Social HX:  Smoking 1 ppd on and off since his 20's. currently smoking 1 pack every three days. Last 10 yrs he was smoking 1 pack every other day.     Review of Systems   Constitutional, HEENT, cardiovascular, pulmonary, gi and gu systems are negative, except as otherwise noted.      Objective    /87   Pulse 93   Temp 97  F (36.1  C) (Tympanic)   Resp 16   Ht 1.74 m (5' 8.5\")   Wt 111.1 kg (245 lb)   SpO2 96%   BMI 36.71 kg/m    Body mass index is 36.71 kg/m .  Physical Exam   GENERAL: healthy, alert and no distress  RESP: lungs clear to auscultation - no rales, rhonchi or wheezes  CV: regular rate and rhythm, normal S1 S2, no S3 or S4, no murmur, click or rub, no peripheral edema and peripheral pulses strong  MS: no gross musculoskeletal defects noted, no edema  Comprehensive back pain exam:  No tenderness, Range of motion not limited by pain, Lower extremity strength functional and equal on both sides, Lower extremity reflexes within normal limits bilaterally, Lower extremity sensation normal and equal on both sides and Straight leg raise negative bilaterally    Results for orders placed or performed in visit on 04/28/21   XR Lumbar Spine 2/3 Views     Status: None    Narrative    LUMBAR SPINE TWO TO THREE VIEWS 4/28/2021 2:17 PM     COMPARISON: None    HISTORY: Spondylolisthesis of lumbosacral region      Impression    IMPRESSION: Five lumbar type vertebrae. Normal alignment. Vertebral  body heights normal. No fractures. Facet arthropathy at L3-L4, L4-L5  and L5-S1.    EMILY HAYES MD       Lung Cancer Screening Shared Decision Making Visit     Babar Bhatt is eligible for lung cancer screening on the basis of the " information provided in my signed lung cancer screening order.     I have discussed with patient the risks and benefits of screening for lung cancer with low-dose CT.     The risks include:  radiation exposure: one low dose chest CT has as much ionizing radiation as about 15 chest x-rays or 6 months of background radiation living in [place]    false positives: 96% of positive findings/nodules are NOT cancer, but some might still require additional diagnostic evaluation, including biopsy  over-diagnosis: some slow growing cancers that might never have been clinically significant will be detected and treated unnecessarily     The benefit of early detection of lung cancer is contingent upon adherence to annual screening or more frequent follow up if indicated.     Furthermore, reaping the benefits of screening requires Babar Bhatt to be willing and physically able to undergo diagnostic procedures, if indicated. Although no specific guide is available for determining severity of comorbidities, it is reasonable to withhold screening in patients who have greater mortality risk from other diseases.     We did discuss that the only way to prevent lung cancer is to not smoke. Smoking cessation counseling was given, duration 3-10 minutes.      I did offer risk estimation using a calculator such as this one:    ShouldIScreen    :[id number]}

## 2021-05-05 DIAGNOSIS — M47.816 SPONDYLOSIS OF LUMBAR REGION WITHOUT MYELOPATHY OR RADICULOPATHY: ICD-10-CM

## 2021-05-05 RX ORDER — GABAPENTIN 300 MG/1
300 CAPSULE ORAL 3 TIMES DAILY
Qty: 90 CAPSULE | Refills: 0 | Status: SHIPPED | OUTPATIENT
Start: 2021-05-05 | End: 2021-06-17

## 2021-05-05 NOTE — TELEPHONE ENCOUNTER
Routing refill request to provider for review/approval because:  Drug not on the [facility] refill protocol   Janay GUZMAN RN

## 2021-06-03 DIAGNOSIS — J44.9 CHRONIC OBSTRUCTIVE PULMONARY DISEASE, UNSPECIFIED COPD TYPE (H): ICD-10-CM

## 2021-06-03 DIAGNOSIS — M47.816 SPONDYLOSIS OF LUMBAR REGION WITHOUT MYELOPATHY OR RADICULOPATHY: ICD-10-CM

## 2021-06-03 RX ORDER — FLUTICASONE PROPIONATE 110 UG/1
1 AEROSOL, METERED RESPIRATORY (INHALATION) 2 TIMES DAILY
Qty: 12 G | Refills: 2 | Status: SHIPPED | OUTPATIENT
Start: 2021-06-03 | End: 2021-08-27

## 2021-06-17 RX ORDER — GABAPENTIN 300 MG/1
300 CAPSULE ORAL 3 TIMES DAILY
Qty: 90 CAPSULE | Refills: 0 | Status: SHIPPED | OUTPATIENT
Start: 2021-06-17 | End: 2021-07-22

## 2021-07-21 ENCOUNTER — ANCILLARY PROCEDURE (OUTPATIENT)
Dept: MRI IMAGING | Facility: CLINIC | Age: 56
End: 2021-07-21
Attending: FAMILY MEDICINE
Payer: COMMERCIAL

## 2021-07-21 ENCOUNTER — ANCILLARY PROCEDURE (OUTPATIENT)
Dept: CT IMAGING | Facility: CLINIC | Age: 56
End: 2021-07-21
Attending: FAMILY MEDICINE
Payer: COMMERCIAL

## 2021-07-21 DIAGNOSIS — M43.17 SPONDYLOLISTHESIS OF LUMBOSACRAL REGION: ICD-10-CM

## 2021-07-21 DIAGNOSIS — Z87.891 PERSONAL HISTORY OF TOBACCO USE: ICD-10-CM

## 2021-07-21 PROCEDURE — 72148 MRI LUMBAR SPINE W/O DYE: CPT | Mod: GC | Performed by: RADIOLOGY

## 2021-07-21 PROCEDURE — 71271 CT THORAX LUNG CANCER SCR C-: CPT | Performed by: RADIOLOGY

## 2021-07-22 ENCOUNTER — TELEPHONE (OUTPATIENT)
Dept: FAMILY MEDICINE | Facility: CLINIC | Age: 56
End: 2021-07-22

## 2021-07-22 DIAGNOSIS — M47.816 SPONDYLOSIS OF LUMBAR REGION WITHOUT MYELOPATHY OR RADICULOPATHY: ICD-10-CM

## 2021-07-22 RX ORDER — GABAPENTIN 300 MG/1
300 CAPSULE ORAL 3 TIMES DAILY
Qty: 90 CAPSULE | Refills: 0 | Status: SHIPPED | OUTPATIENT
Start: 2021-07-22 | End: 2021-08-19

## 2021-07-22 NOTE — RESULT ENCOUNTER NOTE
I reviewed the CT scan and MRI.  He was noted to have a three small solid nodules.  Size of the nodules was small.  It ranges from 2 mm to 4 mm.   Findings are also consistent with trace centrilobular emphysema which is consistent with his history of COPD.      I would recommend continuing with annual CT scans to screen for lung cancer.    Also, his MRI shows disc herniation at the level of L5-S1.  Disc herniation is contacting the S1 nerve root.  No signs of spinal stenosis.  I would like to discuss results of the CT and MRI with Mr. Godinez.  Can you please advise him to schedule an office visit or a virtual visit to discuss the results.        Best regards  Nabila Newby MD

## 2021-08-04 ENCOUNTER — OFFICE VISIT (OUTPATIENT)
Dept: FAMILY MEDICINE | Facility: CLINIC | Age: 56
End: 2021-08-04
Payer: COMMERCIAL

## 2021-08-04 VITALS
DIASTOLIC BLOOD PRESSURE: 89 MMHG | HEART RATE: 71 BPM | BODY MASS INDEX: 36.58 KG/M2 | HEIGHT: 69 IN | OXYGEN SATURATION: 96 % | WEIGHT: 247 LBS | RESPIRATION RATE: 16 BRPM | TEMPERATURE: 97.7 F | SYSTOLIC BLOOD PRESSURE: 148 MMHG

## 2021-08-04 DIAGNOSIS — E78.5 HYPERLIPIDEMIA LDL GOAL <130: ICD-10-CM

## 2021-08-04 DIAGNOSIS — E78.5 HYPERLIPIDEMIA WITH TARGET LDL LESS THAN 100: ICD-10-CM

## 2021-08-04 DIAGNOSIS — J43.2 CENTRILOBULAR EMPHYSEMA (H): ICD-10-CM

## 2021-08-04 DIAGNOSIS — M51.26 LUMBAR DISC HERNIATION: Primary | ICD-10-CM

## 2021-08-04 DIAGNOSIS — I10 BENIGN ESSENTIAL HYPERTENSION: ICD-10-CM

## 2021-08-04 PROCEDURE — 99214 OFFICE O/P EST MOD 30 MIN: CPT | Performed by: FAMILY MEDICINE

## 2021-08-04 RX ORDER — TRIAMTERENE AND HYDROCHLOROTHIAZIDE 75; 50 MG/1; MG/1
1 TABLET ORAL DAILY
Qty: 30 TABLET | Refills: 0 | Status: SHIPPED | OUTPATIENT
Start: 2021-08-04 | End: 2021-09-01

## 2021-08-04 RX ORDER — TRAMADOL HYDROCHLORIDE 50 MG/1
50 TABLET ORAL
Qty: 14 TABLET | Refills: 0 | Status: CANCELLED | OUTPATIENT
Start: 2021-08-05

## 2021-08-04 RX ORDER — ROSUVASTATIN CALCIUM 20 MG/1
20 TABLET, COATED ORAL DAILY
Qty: 90 TABLET | Refills: 1 | Status: SHIPPED | OUTPATIENT
Start: 2021-08-04 | End: 2022-01-17

## 2021-08-04 RX ORDER — TRAMADOL HYDROCHLORIDE 50 MG/1
50 TABLET ORAL
Qty: 16 TABLET | Refills: 0 | Status: SHIPPED | OUTPATIENT
Start: 2021-08-04 | End: 2021-09-08

## 2021-08-04 ASSESSMENT — MIFFLIN-ST. JEOR: SCORE: 1932.82

## 2021-08-04 NOTE — PROGRESS NOTES
Assessment & Plan     Lumbar disc herniation  Assessment: Recent MRI was reviewed with the patient.  He has a lumbar disc herniation at the level of L5-S1 contacting the transversing right S1 nerve root.  He is currently taking gabapentin 300 mg 3 times daily.  He was previously taking tramadol in 2019 and 2020.  Previous dose of tramadol was 100 mg 4 times daily.  We briefly discussed seeing a spine specialist for steroid injections.  We resumed opioid therapy at a low dose 50 mg 4 times a week.  Plan:  - Spine Referral; Future  - traMADol (ULTRAM) 50 MG tablet; Take 1 tablet (50 mg) by mouth four times a week  -Urine drug screen is pending.    Centrilobular emphysema (H)  Patient has a long history of smoking.  He is currently smoking 1 cigarette/day.  Recent CT scan findings were discussed with the patient.  He has centrilobular emphysema.  Patient was referred to pulmonology to establish care and undergo spirometry testing.  - Adult Pulmonary Medicine Referral; Future    Hyperlipidemia with target LDL less than 100  The patient ran out of medications 4 weeks ago.  Restarted medications today.  .- rosuvastatin (CRESTOR) 20 MG tablet; Take 1 tablet (20 mg) by mouth daily    Benign essential hypertension  Ran out of medications 4 weeks ago.  Restarted medications today.  - triamterene-HCTZ (MAXZIDE) 75-50 MG tablet; Take 1 tablet by mouth daily    Hyperlipidemia LDL goal <130  - rosuvastatin (CRESTOR) 20 MG tablet; Take 1 tablet (20 mg) by mouth daily    Return in about 3 months (around 11/4/2021) for Physical Exam.    Nabila Newby MD  [facility]    Margo Eckert is a 55 year old who presents for the following health issues:    HPI     Patient would like to discuss MRI and CT scan results with provider done at 7/21/2021    Chronic/Recurring Back Pain Follow Up      Where is your back pain located? (Select all that apply) low back center    How would you describe your back pain?   "Crushing sensation    Where does your back pain spread? the right and left  thigh    Since your last clinic visit for back pain, how has your pain changed? always present, but gets better and worse    Does your back pain interfere with your job? YES    Since your last visit, have you tried any new treatment? No      How many servings of fruits and vegetables do you eat daily?  2-3    On average, how many sweetened beverages do you drink each day (Examples: soda, juice, sweet tea, etc.  Do NOT count diet or artificially sweetened beverages)?   Occasional.. sometimes once per day.     How many days per week do you exercise enough to make your heart beat faster? 3 or less not since the pain started.       How many minutes a day do you exercise enough to make your heart beat faster? none    How many days per week do you miss taking your medication? 0    Review of Systems   Constitutional, HEENT, cardiovascular, pulmonary, gi and gu systems are negative, except as otherwise noted.      Objective    BP (!) 148/89   Pulse 71   Temp 97.7  F (36.5  C) (Skin)   Resp 16   Ht 1.74 m (5' 8.5\")   Wt 112 kg (247 lb)   SpO2 96%   BMI 37.01 kg/m    Body mass index is 37.01 kg/m .  Physical Exam   GENERAL: healthy, alert and no distress  RESP: lungs clear to auscultation - no rales, rhonchi or wheezes  CV: regular rate and rhythm, normal S1 S2, no S3 or S4, no murmur, click or rub, no peripheral edema and peripheral pulses strong  Comprehensive back pain exam:  Tenderness of L4, L5 and S1, Range of motion not limited by pain, Lower extremity strength functional and equal on both sides, Lower extremity reflexes within normal limits bilaterally, Lower extremity sensation normal and equal on both sides and Straight leg positive on  right, indicating possible ipsilateral radiculopathy     MR LUMBAR SPINE W/O CONTRAST 7/21/2021 3:42 PM     Provided History: Low back pain, > 6 wks; Spondylolisthesis of  lumbosacral region  ICD-10: " Spondylolisthesis of lumbosacral region     Comparison: Radiograph dated 4/28/2021     Technique: Sagittal T1-weighted, sagittal T2-weighted, sagittal STIR,  sagittal diffusion-weighted, axial T2-weighted, and axial gradient  echo images of the lumbar spine were obtained without the  administration of intravenous contrast.     Findings: Counting down from C2, there are 5 lumbar-type vertebrae.   The tip of the conus medullaris is at L1. Normal alignment of the  lumbar vertebrae. No significant disc height narrowing at any level.  Normal bone marrow signal on STIR images. Epidural lipomatosis caudal  to L5-S1.  On a level by level basis:     T12-L1: No spinal canal or neuroforaminal stenosis.     L1-2: Facet arthropathy bilaterally. No spinal canal or neuroforaminal  stenosis.     L2-3: No spinal canal or neuroforaminal stenosis.     L3-4: No spinal canal or neuroforaminal stenosis.     L4-5: Bilateral facet arthropathy. Ligamentum flavum thickening. Mild  broad based disc bulge. No spinal canal or neuroforaminal stenosis.     L5-S1: Bilateral facet arthropathy. Focal right central/subarticular  disc protrusion contacting the traversing right S1 nerve root. Mild  left neural foraminal narrowing. Right neural foramen is pain. No  significant spinal canal stenosis.     No abnormality of the paraspinous soft tissues.                                                                      Impression:   1. Focal right central/subarticular disc protrusion at L5-S1  contacting the traversing right S1 nerve root.  2. No high-grade neural foraminal or spinal canal stenosis at any  level.     I have personally reviewed the examination and initial interpretation  and I agree with the findings.

## 2021-08-06 DIAGNOSIS — J43.2 CENTRILOBULAR EMPHYSEMA (H): Primary | ICD-10-CM

## 2021-08-06 NOTE — TELEPHONE ENCOUNTER
SPINE PATIENTS - NEW PROTOCOL PREVISIT    RECORDS RECEIVED FROM: Internal   REASON FOR VISIT: Lumbar disc herniation   Date of Appt: 8/13/21   NOTES (FOR ALL VISITS) STATUS DETAILS   OFFICE NOTE from referring provider Internal Dr Newby @ [facility] (PCP):  8/4/21   OFFICE NOTE from other specialist N/A    DISCHARGE SUMMARY from hospital N/A    DISCHARGE REPORT from ER N/A    [facility] REPORT N/A    MEDICATION LIST Internal    IMAGING  (FOR ALL VISITS)     MRI (HEAD, NECK, SPINE) Internal [facility]:  MRI Lumbar Spine 7/21/21   XRAY (SPINE) *NEUROSURGERY* Internal [facility]:  XR Lumbar Spine 4/28/21   CT (HEAD, NECK, SPINE) N/A

## 2021-08-11 ENCOUNTER — TELEPHONE (OUTPATIENT)
Dept: PULMONOLOGY | Facility: CLINIC | Age: 56
End: 2021-08-11

## 2021-08-11 NOTE — TELEPHONE ENCOUNTER
Spoke with pt and able to arrange a CXR prior to NEW appt with Dr. Newell in November. Details confirmed with pt who requested hard copy of itinerary be mailed to his home; mailing address verified.

## 2021-08-12 NOTE — TELEPHONE ENCOUNTER
RECORDS RECEIVED FROM: internal    DATE RECEIVED: 11.3.21   NOTES STATUS DETAILS   OFFICE NOTE from referring provider internal  Nabila Newby MD   OFFICE NOTE from other specialist na    DISCHARGE SUMMARY from hospital na    DISCHARGE REPORT from the ER na    MEDICATION LIST internal     IMAGING  (NEED IMAGES AND REPORTS)     CT SCAN Na     CHEST XRAY (CXR) internal  Scheduled 11.3.21    TESTS     PULMONARY FUNCTION TESTING (PFT) internal  Scheduled 11.3.21

## 2021-08-13 ENCOUNTER — OFFICE VISIT (OUTPATIENT)
Dept: NEUROSURGERY | Facility: CLINIC | Age: 56
End: 2021-08-13
Attending: FAMILY MEDICINE
Payer: COMMERCIAL

## 2021-08-13 ENCOUNTER — PRE VISIT (OUTPATIENT)
Dept: NEUROSURGERY | Facility: CLINIC | Age: 56
End: 2021-08-13

## 2021-08-13 VITALS
RESPIRATION RATE: 16 BRPM | WEIGHT: 241.7 LBS | DIASTOLIC BLOOD PRESSURE: 83 MMHG | OXYGEN SATURATION: 97 % | SYSTOLIC BLOOD PRESSURE: 124 MMHG | HEART RATE: 74 BPM | BODY MASS INDEX: 36.22 KG/M2

## 2021-08-13 DIAGNOSIS — M51.26 LUMBAR DISC HERNIATION: ICD-10-CM

## 2021-08-13 PROCEDURE — 99204 OFFICE O/P NEW MOD 45 MIN: CPT | Performed by: NEUROLOGICAL SURGERY

## 2021-08-13 ASSESSMENT — PAIN SCALES - GENERAL: PAINLEVEL: MODERATE PAIN (4)

## 2021-08-13 NOTE — NURSING NOTE
Chief Complaint   Patient presents with     Consult     ump new- lumbar disk herniation     Ritika Cha

## 2021-08-13 NOTE — LETTER
8/13/2021       RE: Babar Bhatt  [address] [facility] [address]     Dear Colleague,    Thank you for referring your patient, Babar Bhatt, to the [facility] [place] at [facility]. Please see a copy of my visit note below.    Service Date: 08/13/2021    HISTORY OF PRESENT ILLNESS:  Mr. Babar Bhatt is a 55-year-old  male who presents to our clinic for evaluation of low back pain with bilateral upper thigh pain/paresthesia.  The patient was under the impression that he was coming to a pain clinic for an injection, not to our surgical clinic.      According to the patient, he has been having low back pain for the last couple years, which is his primary symptom, but he also has some discomfort in his legs and he described it as a tingling sensation in the lateral aspect of his thighs, but the pain never radiates below the knee.  Pain is usually worse with activities and it does improve with rest.    Past Medical History:   Diagnosis Date     Chronic obstructive pulmonary disease, unspecified COPD type (H) 4/28/2021     Depressive disorder      Hypertension      No past surgical history on file.    Current Outpatient Medications   Medication     albuterol (ACCUNEB) 1.25 MG/3ML neb solution     albuterol (PROAIR HFA/PROVENTIL HFA/VENTOLIN HFA) 108 (90 Base) MCG/ACT inhaler     buPROPion (WELLBUTRIN XL) 300 MG 24 hr tablet     citalopram (CELEXA) 40 MG tablet     fluticasone (FLOVENT HFA) 110 MCG/ACT inhaler     gabapentin (NEURONTIN) 300 MG capsule     nicotine (NICORETTE) 2 MG gum     rosuvastatin (CRESTOR) 20 MG tablet     traMADol (ULTRAM) 50 MG tablet     traZODone (DESYREL) 50 MG tablet     triamterene-HCTZ (MAXZIDE) 75-50 MG tablet     zolpidem (AMBIEN) 5 MG tablet     No current facility-administered medications for this visit.       The patient is in no acute distress.  On exam, his strength is full.  He  has no weakness or numbness.  Normal gait and balance.    I personally reviewed the lumbar spine MRI scan, which is fairly unremarkable.  There is very mild broad-based disk bulge on the right side at L5-S1, barely encroaching upon the S1 nerve root.  There is also evidence of a moderate to severe facet arthropathy at L4-L5 and moderate facet arthropathy at L5-S1.    IMPRESSION AND PLAN:  Mr. Babar Bhatt presents with primary complaint of low back pain with some discomfort and paresthesia of the lateral aspect of his thighs.  He has no tenderness over the trochanteric bursa.  He has no significant nerve compression, although there is some mild disk protrusion at the right side of L5-S1, but I do not think this is causing his symptoms.  He does, however, have facet arthropathy at L4-L5 and L5-S1.  He may benefit from bilateral facet injections.  I will refer him to a pain clinic for further evaluation.    Devonte Gatica MD

## 2021-08-13 NOTE — PROGRESS NOTES
Service Date: 08/13/2021    HISTORY OF PRESENT ILLNESS:  Mr. Babar Bhatt is a 55-year-old  male who presents to our clinic for evaluation of low back pain with bilateral upper thigh pain/paresthesia.  The patient was under the impression that he was coming to a pain clinic for an injection, not to our surgical clinic.      According to the patient, he has been having low back pain for the last couple years, which is his primary symptom, but he also has some discomfort in his legs and he described it as a tingling sensation in the lateral aspect of his thighs, but the pain never radiates below the knee.  Pain is usually worse with activities and it does improve with rest.    Past Medical History:   Diagnosis Date     Chronic obstructive pulmonary disease, unspecified COPD type (H) 4/28/2021     Depressive disorder      Hypertension      No past surgical history on file.    Current Outpatient Medications   Medication     albuterol (ACCUNEB) 1.25 MG/3ML neb solution     albuterol (PROAIR HFA/PROVENTIL HFA/VENTOLIN HFA) 108 (90 Base) MCG/ACT inhaler     buPROPion (WELLBUTRIN XL) 300 MG 24 hr tablet     citalopram (CELEXA) 40 MG tablet     fluticasone (FLOVENT HFA) 110 MCG/ACT inhaler     gabapentin (NEURONTIN) 300 MG capsule     nicotine (NICORETTE) 2 MG gum     rosuvastatin (CRESTOR) 20 MG tablet     traMADol (ULTRAM) 50 MG tablet     traZODone (DESYREL) 50 MG tablet     triamterene-HCTZ (MAXZIDE) 75-50 MG tablet     zolpidem (AMBIEN) 5 MG tablet     No current facility-administered medications for this visit.       The patient is in no acute distress.  On exam, his strength is full.  He has no weakness or numbness.  Normal gait and balance.    I personally reviewed the lumbar spine MRI scan, which is fairly unremarkable.  There is very mild broad-based disk bulge on the right side at L5-S1, barely encroaching upon the S1 nerve root.  There is also evidence of a moderate to severe facet arthropathy at  L4-L5 and moderate facet arthropathy at L5-S1.    IMPRESSION AND PLAN:  Mr. Babar Bhatt presents with primary complaint of low back pain with some discomfort and paresthesia of the lateral aspect of his thighs.  He has no tenderness over the trochanteric bursa.  He has no significant nerve compression, although there is some mild disk protrusion at the right side of L5-S1, but I do not think this is causing his symptoms.  He does, however, have facet arthropathy at L4-L5 and L5-S1.  He may benefit from bilateral facet injections.  I will refer him to a pain clinic for further evaluation.    Devonte Gatica MD        D: 2021   T: 2021   MT: ERIKA    Name:     BABAR BHATT  MRN:      [id number]        Account:      [id number]   :      1965           Service Date: 2021       Document: [id number]

## 2021-08-19 DIAGNOSIS — M47.816 SPONDYLOSIS OF LUMBAR REGION WITHOUT MYELOPATHY OR RADICULOPATHY: ICD-10-CM

## 2021-08-19 RX ORDER — GABAPENTIN 300 MG/1
CAPSULE ORAL
Qty: 90 CAPSULE | Refills: 0 | Status: SHIPPED | OUTPATIENT
Start: 2021-08-19 | End: 2021-09-08

## 2021-08-20 ENCOUNTER — TELEPHONE (OUTPATIENT)
Dept: FAMILY MEDICINE | Facility: CLINIC | Age: 56
End: 2021-08-20

## 2021-08-20 DIAGNOSIS — G89.4 CHRONIC PAIN SYNDROME: Primary | ICD-10-CM

## 2021-08-20 NOTE — TELEPHONE ENCOUNTER
FS,    Pt says he was told to come in for a urine test before he can get a refill.    I scheduled him in Monday at 3 for lab apt.  Please order drug screen as appropriate.    Thanks,    Chelly Peña RN

## 2021-08-23 ENCOUNTER — LAB (OUTPATIENT)
Dept: LAB | Facility: CLINIC | Age: 56
End: 2021-08-23
Payer: COMMERCIAL

## 2021-08-23 DIAGNOSIS — G89.4 CHRONIC PAIN SYNDROME: ICD-10-CM

## 2021-08-23 LAB
AMPHETAMINES UR QL: NOT DETECTED
BARBITURATES UR QL SCN: NOT DETECTED
BENZODIAZ UR QL SCN: NOT DETECTED
BUPRENORPHINE UR QL: NOT DETECTED
CANNABINOIDS UR QL: DETECTED
COCAINE UR QL SCN: NOT DETECTED
D-METHAMPHET UR QL: NOT DETECTED
METHADONE UR QL SCN: NOT DETECTED
OPIATES UR QL SCN: NOT DETECTED
OXYCODONE UR QL SCN: NOT DETECTED
PCP UR QL SCN: NOT DETECTED
PROPOXYPH UR QL: NOT DETECTED
TRICYCLICS UR QL SCN: NOT DETECTED

## 2021-08-23 PROCEDURE — 80306 DRUG TEST PRSMV INSTRMNT: CPT

## 2021-08-25 DIAGNOSIS — J44.9 CHRONIC OBSTRUCTIVE PULMONARY DISEASE, UNSPECIFIED COPD TYPE (H): ICD-10-CM

## 2021-08-27 RX ORDER — FLUTICASONE PROPIONATE 110 UG/1
1 AEROSOL, METERED RESPIRATORY (INHALATION) 2 TIMES DAILY
Qty: 12 G | Refills: 2 | Status: SHIPPED | OUTPATIENT
Start: 2021-08-27 | End: 2021-12-07

## 2021-08-27 NOTE — TELEPHONE ENCOUNTER
"Requested Prescriptions   Signed Prescriptions Disp Refills    fluticasone (FLOVENT HFA) 110 MCG/ACT inhaler 12 g 2     Sig: Inhale 1 puff into the lungs 2 times daily       Inhaled Steroids Protocol Passed - 8/25/2021 12:32 PM        Passed - Patient is age 12 or older        Passed - Recent (12 mo) or future (30 days) visit within the authorizing provider's specialty     Patient has had an office visit with the authorizing provider or a provider within the authorizing providers department within the previous 12 mos or has a future within next 30 days. See \"Patient Info\" tab in inbasket, or \"Choose Columns\" in Meds & Orders section of the refill encounter.              Passed - Medication is active on med list           Sonia Hinton RN  [facility]     "

## 2021-09-03 DIAGNOSIS — M51.26 LUMBAR DISC HERNIATION: ICD-10-CM

## 2021-09-03 NOTE — TELEPHONE ENCOUNTER
FS,   Patient calling for Tramadol refill   States you were going to automatically refill his pain med once the drug test result was back but you haven't   Positive for cannabis   Informed pt   He said you are aware of that though    Routing refill request to provider for review/approval because:  Drug not on the [facility] refill protocol     Please authorize if appropriate.  Thanks,  Janay GUZMAN RN

## 2021-09-08 ENCOUNTER — TELEPHONE (OUTPATIENT)
Dept: FAMILY MEDICINE | Facility: CLINIC | Age: 56
End: 2021-09-08

## 2021-09-08 DIAGNOSIS — M47.816 SPONDYLOSIS OF LUMBAR REGION WITHOUT MYELOPATHY OR RADICULOPATHY: ICD-10-CM

## 2021-09-08 RX ORDER — GABAPENTIN 300 MG/1
CAPSULE ORAL
Qty: 90 CAPSULE | Refills: 0 | Status: SHIPPED | OUTPATIENT
Start: 2021-09-08 | End: 2021-10-20

## 2021-09-08 RX ORDER — TRAMADOL HYDROCHLORIDE 50 MG/1
50 TABLET ORAL
Qty: 16 TABLET | Refills: 0 | Status: SHIPPED | OUTPATIENT
Start: 2021-09-09 | End: 2022-03-14

## 2021-09-08 NOTE — TELEPHONE ENCOUNTER
Reason for Call:  Medication or medication refill:    Do you use a [facility]?  Name of the pharmacy and phone number for the current request:  [facility] [address] [place] [address] at [address] [place] [address] [address]    Name of the medication requested: gabapentin (NEURONTIN) 300 MG capsule [6544] (Order [id number])    Other request:    Can we leave a detailed message on this number? YES    Phone number patient can be reached at: Home number on file [phone] (home)    Best Time: anytime    Call taken on 9/8/2021 at 10:48 AM by Renetta Flores

## 2021-09-16 DIAGNOSIS — M47.816 SPONDYLOSIS OF LUMBAR REGION WITHOUT MYELOPATHY OR RADICULOPATHY: ICD-10-CM

## 2021-09-16 RX ORDER — GABAPENTIN 300 MG/1
CAPSULE ORAL
Qty: 90 CAPSULE | Refills: 0 | Status: CANCELLED | OUTPATIENT
Start: 2021-09-16

## 2021-09-23 ENCOUNTER — OFFICE VISIT (OUTPATIENT)
Dept: ANESTHESIOLOGY | Facility: CLINIC | Age: 56
End: 2021-09-23
Attending: NURSE PRACTITIONER
Payer: COMMERCIAL

## 2021-09-23 VITALS — SYSTOLIC BLOOD PRESSURE: 161 MMHG | OXYGEN SATURATION: 97 % | HEART RATE: 82 BPM | DIASTOLIC BLOOD PRESSURE: 92 MMHG

## 2021-09-23 DIAGNOSIS — Z11.59 ENCOUNTER FOR SCREENING FOR OTHER VIRAL DISEASES: ICD-10-CM

## 2021-09-23 DIAGNOSIS — M51.26 LUMBAR DISC HERNIATION: ICD-10-CM

## 2021-09-23 DIAGNOSIS — M47.816 LUMBAR SPONDYLOSIS: Primary | ICD-10-CM

## 2021-09-23 PROCEDURE — 99204 OFFICE O/P NEW MOD 45 MIN: CPT | Performed by: ANESTHESIOLOGY

## 2021-09-23 RX ORDER — CYCLOBENZAPRINE HCL 10 MG
5-20 TABLET ORAL 3 TIMES DAILY PRN
Qty: 60 TABLET | Refills: 0 | Status: SHIPPED | OUTPATIENT
Start: 2021-09-23 | End: 2021-10-23

## 2021-09-23 ASSESSMENT — PAIN SCALES - GENERAL: PAINLEVEL: EXTREME PAIN (8)

## 2021-09-23 NOTE — LETTER
9/23/2021       RE: Babar Bhatt  [address] [facility] [address]     Dear Colleague,    Thank you for referring your patient, Babar Bhatt, to the [facility] FOR COMPREHENSIVE PAIN MANAGEMENT [facility]. Please see a copy of my visit note below.    VISIT RECOMMENDATIONS:  1. Trial cyclobenzaprine 10 mg TID PRN for myofascial pain.  Will assess for benefit at follow up.   2. Order placed for bilateral Lumbar 4/5 and lumbar 5/sacral 1 facet joint intraarticular injections with steroid medication    COMPREHENSIVE PAIN CLINIC INITIAL EVALUATION    I had the pleasure of meeting Mr. Babar Bhatt on 9/23/2021 in the [facility] in consult for Dr. Mayers with regards to his low back pain    Subjective:  The patient is a 55 year old male with past medical history of HTN, depression, and COPD who presents for evaluation of low back pain.  The patient's pain began several years ago without any particular precipitating event and has been slowly progressive since that time.  He reports that his pain is located primarily in the bilateral low back and radiates to the anterior thighs and occasionally the posterior thighs.  The patient describes the pain as sharp and stiff in the back and numb and burning in the thighs.  He reports that the pain is made worse by prolonged standing (>15 minutes standing, >30 minutes walking).  His pain is improved with sitting or flexing at the waist (improved but not eliminated).  He denies any associated neurological symptoms of the lower extremities.  The patient's pain does not have a significant temporal component, but is activity dependent.  His pain is variable with his activity level but can be as low as 5/10 or as severe as 10/10.     He denies any new problems with falls or balance, any new bowel or bladder incontinence, any night sweats or unexplained fevers, or any sudden or unexpected  weight loss.     Babar Bhatt has not been seen at a pain clinic in the past.      Current treatments:  Gabapentin 300 mg TID (helpful for leg pain)  Tramadol 50 mg PRN    Previous medication treatments included:  Anti-convulsants: Gabapentin  Muscle relaxors: not tried  Anti-depressants: not tried  Acetaminophen/NSAIDs: non beneficial  Topicals: non beneficial    Other treatments have included:  Physical therapy: non beneficial  Pain Psychology: not tried  Chiropractic: not tried  Acupuncture: not tried  TENs Unit: Possibly for legs  Injections: not tried  Surgeries: none in the area    Past Medical History:  Medical history reviewed.   Past Medical History:   Diagnosis Date     Chronic obstructive pulmonary disease, unspecified COPD type (H) 4/28/2021     Depressive disorder      Hypertension       Patient Active Problem List   Diagnosis     CARDIOVASCULAR SCREENING; LDL GOAL LESS THAN 160     Chronic pain syndrome     Chronic obstructive pulmonary disease, unspecified COPD type (H)     Personal history of tobacco use     Spondylolisthesis of lumbosacral region     Past Surgical History:  Pertinent surgical history reviewed.   No past surgical history on file.     Medications: Pertinent medications reviewed and updated  Current Outpatient Medications   Medication Sig Dispense Refill     albuterol (ACCUNEB) 1.25 MG/3ML neb solution Take 1 vial (1.25 mg) by nebulization every 6 hours as needed for shortness of breath / dyspnea or wheezing 3 mL 0     albuterol (PROAIR HFA/PROVENTIL HFA/VENTOLIN HFA) 108 (90 Base) MCG/ACT inhaler Inhale 2 puffs into the lungs every 6 hours 6.7 g 0     buPROPion (WELLBUTRIN XL) 300 MG 24 hr tablet        citalopram (CELEXA) 40 MG tablet TK 1 T PO QD       fluticasone (FLOVENT HFA) 110 MCG/ACT inhaler Inhale 1 puff into the lungs 2 times daily 12 g 2     gabapentin (NEURONTIN) 300 MG capsule TAKE 1 CAPSULE(300 MG) BY MOUTH THREE TIMES DAILY 90 capsule 0     nicotine (NICORETTE) 2 MG  gum Place 1 each (2 mg) inside cheek as needed for smoking cessation Place 1 each inside cheek as needed for smoking cessation. 50 each 1     rosuvastatin (CRESTOR) 20 MG tablet Take 1 tablet (20 mg) by mouth daily 90 tablet 1     traMADol (ULTRAM) 50 MG tablet Take 1 tablet (50 mg) by mouth four times a week 16 tablet 0     traZODone (DESYREL) 50 MG tablet Take 300 mg by mouth At Bedtime        triamterene-HCTZ (MAXZIDE) 75-50 MG tablet Take 1 tablet by mouth daily 90 tablet 0     zolpidem (AMBIEN) 5 MG tablet Take 5 mg by mouth nightly as needed for sleep       [place] Pres[place] Monitoring [place] reviewed     Allergies: Pertinent allergies reviewed   No Known Allergies    Family History:   family history includes Diabetes in his father, mother, and sister; Hypertension in his mother.    Social history:   Social History     Socioeconomic History     Marital status: Single     Spouse name: Not on file     Number of children: Not on file     Years of education: Not on file     Highest education level: Not on file   Occupational History     Not on file   Tobacco Use     Smoking status: Current Some Day Smoker     Packs/day: 0.50     Smokeless tobacco: Never Used   Substance and Sexual Activity     Alcohol use: Yes     Comment: occassionally     Drug use: Not Currently     Types: Marijuana     Sexual activity: Yes     Partners: Female   Other Topics Concern     Not on file   Social History Narrative     Not on file     Social Determinants of Health     Financial Resource Strain:      Difficulty of Paying Living Expenses:    Food Insecurity:      Worried About Running Out of Food in the Last Year:      Ran Out of Food in the Last Year:    Transportation Needs:      Lack of Transportation (Medical):      Lack of Transportation (Non-Medical):    Physical Activity:      Days of Exercise per Week:      Minutes of Exercise per Session:    Stress:      Feeling of Stress :    Social Connections:      Frequency of  Communication with Friends and Family:      Frequency of Social Gatherings with Friends and Family:      Attends Sikhism Services:      Active Member of Clubs or Organizations:      Attends Club or Organization Meetings:      Marital Status:    Intimate Partner Violence:      Fear of Current or Ex-Partner:      Emotionally Abused:      Physically Abused:      Sexually Abused:      Social History     Social History Narrative     Not on file     He lives in [place]. He is currently working. He works as a .  Smokin packs/week. Alcohol: occasional. Street drugs: cannabis, helpful for pain.     Review of Systems:  ROS   The 14 system ROS was reviewed from the intake questionnaire; results listed at end of note.    Physical Exam:  BP (!) 161/92   Pulse 82   SpO2 97%     Physical Exam   Constitutional: He is oriented to person, place, and time.  He appears well-developed and well-nourished. He is not in acute distress.   HENT:     Head: Normocephalic and atraumatic.     Eyes: Pupils are equal, round, and reactive to light. EOM are normal. No scleral icterus.     Neck: Normal range of motion. Neck supple.   Cardiovascular: Normal rate and regular rhythm.   Pulmonary/Chest:  NWOB. No respiratory distress.   Abdominal: deferred  Genitourinary: deferred  Neurological: He is alert and oriented to person, place, and time. CN II-XII grossly intact, coordination grossly normal.  Romberg test negative.  Skin: Skin is warm and dry. He is not diaphoretic.   Psychiatric: He has a normal mood and affect. His behavior is normal. Judgment and thought content normal.  MSK: Gait is normal, posture is slightly flexed at the waist    Lumbar Spine Exam:    There are tender points identified in the lumbar paraspinal musculature  There are not trigger points identified in the lumbar paraspinal musculature    Range of Motion Flexion: normal     Extension: reduced     Rotation: normal     Side-bending: reduced    There IS  exacerbation of the patient's typical pain with rotation and extension of the lumbar spine to the left side and right side       Left  Right  Seated Slump test Negative Negative    Lower Extremity Manual Muscle Testing    Motor (0 to 5 scale):        Right         Left    Hip flexion (L1/2)            5              5    Knee extension (L2,3,4)  5              5    Ankle dorsiflexion (L4/5)             5              5    Long toe extension (L5)              5              5    Ankle plantar flexion(S1)            5              5    Reflexes (0 to 4 scale):    Right    Left    Patellar (L4)              2           2    Achilles (S1)              2           2    Clonus:    Bilateral absent    Sensation:    Light touch: grossly intact    Imaging:  MR LUMBAR SPINE W/O CONTRAST 7/21/2021 3:42 PM     Provided History: Low back pain, > 6 wks; Spondylolisthesis of  lumbosacral region  ICD-10: Spondylolisthesis of lumbosacral region     Comparison: Radiograph dated 4/28/2021     Technique: Sagittal T1-weighted, sagittal T2-weighted, sagittal STIR,  sagittal diffusion-weighted, axial T2-weighted, and axial gradient  echo images of the lumbar spine were obtained without the  administration of intravenous contrast.     Findings: Counting down from C2, there are 5 lumbar-type vertebrae.   The tip of the conus medullaris is at L1. Normal alignment of the  lumbar vertebrae. No significant disc height narrowing at any level.  Normal bone marrow signal on STIR images. Epidural lipomatosis caudal  to L5-S1.  On a level by level basis:     T12-L1: No spinal canal or neuroforaminal stenosis.     L1-2: Facet arthropathy bilaterally. No spinal canal or neuroforaminal  stenosis.     L2-3: No spinal canal or neuroforaminal stenosis.     L3-4: No spinal canal or neuroforaminal stenosis.     L4-5: Bilateral facet arthropathy. Ligamentum flavum thickening. Mild  broad based disc bulge. No spinal canal or neuroforaminal  stenosis.     L5-S1: Bilateral facet arthropathy. Focal right central/subarticular  disc protrusion contacting the traversing right S1 nerve root. Mild  left neural foraminal narrowing. Right neural foramen is pain. No  significant spinal canal stenosis.     No abnormality of the paraspinous soft tissues.                                                                      Impression:   1. Focal right central/subarticular disc protrusion at L5-S1  contacting the traversing right S1 nerve root.  2. No high-grade neural foraminal or spinal canal stenosis at any  level.    Assessment:    The patient is a 55 year old male with PMHx of HTN, depression, and COPD who was referred by Dr. Mayers for evaluation of chronic low back pain.  Based upon his reported symptoms, exam findings, and the absence of specific findings on available imaging, he likely has a combination of facet arthropathy mediated pain and overlying myofascial pain.  We will initiate therapy with intraarticular facet injections to address his facet arthropathy and a trial of cyclobenzaprine for overlying myofascial pain.  Pending his response to these therapies, alternative procedures or medications can be tried.    Visit Diagnoses:  1. Lumbar spondylosis  2. Facet arthropathy  3. Myofascial pain    Plan:  Work up:    MRI reviewed, nothing further at this time    Referrals:    None at this time    Medications:    Trial cyclobenzaprine 5-20 mg TID PRN for myofascial pain.  Will assess for benefit at follow up.     Therapies:    Deferred at this time, however referrals for chiropractic or acupuncture could be considered    Interventions:    Order placed for bilateral L4/5 and L5/S1 facet joint intraarticular injections    Follow up: 4-6 weeks following above listed injections    Thank you for the consult.    A total of 57 minutes was spent on chart review, ordering studies/procedures/medications, face to face interaction, care coordination, and documentation  on the day of the patient's visit.  Greater than 50% of the time spent was in direct patient interaction and care.    Claudio Carlton MD    Department of Anesthesiology  Pain Management Division      Again, thank you for allowing me to participate in the care of your patient.      Sincerely,    Claudio Carlton MD

## 2021-09-23 NOTE — PROGRESS NOTES
VISIT RECOMMENDATIONS:  1. Trial cyclobenzaprine 10 mg TID PRN for myofascial pain.  Will assess for benefit at follow up.   2. Order placed for bilateral Lumbar 4/5 and lumbar 5/sacral 1 facet joint intraarticular injections with steroid medication    COMPREHENSIVE PAIN CLINIC INITIAL EVALUATION    I had the pleasure of meeting Mr. Babar Bhatt on 9/23/2021 in the [facility] in consult for Dr. Mayers with regards to his low back pain    Subjective:  The patient is a 55 year old male with past medical history of HTN, depression, and COPD who presents for evaluation of low back pain.  The patient's pain began several years ago without any particular precipitating event and has been slowly progressive since that time.  He reports that his pain is located primarily in the bilateral low back and radiates to the anterior thighs and occasionally the posterior thighs.  The patient describes the pain as sharp and stiff in the back and numb and burning in the thighs.  He reports that the pain is made worse by prolonged standing (>15 minutes standing, >30 minutes walking).  His pain is improved with sitting or flexing at the waist (improved but not eliminated).  He denies any associated neurological symptoms of the lower extremities.  The patient's pain does not have a significant temporal component, but is activity dependent.  His pain is variable with his activity level but can be as low as 5/10 or as severe as 10/10.     He denies any new problems with falls or balance, any new bowel or bladder incontinence, any night sweats or unexplained fevers, or any sudden or unexpected weight loss.     Babar Bhatt has not been seen at a pain clinic in the past.      Current treatments:  Gabapentin 300 mg TID (helpful for leg pain)  Tramadol 50 mg PRN    Previous medication treatments included:  Anti-convulsants: Gabapentin  Muscle relaxors: not tried  Anti-depressants: not tried  Acetaminophen/NSAIDs: non  beneficial  Topicals: non beneficial    Other treatments have included:  Physical therapy: non beneficial  Pain Psychology: not tried  Chiropractic: not tried  Acupuncture: not tried  TENs Unit: Possibly for legs  Injections: not tried  Surgeries: none in the area    Past Medical History:  Medical history reviewed.   Past Medical History:   Diagnosis Date     Chronic obstructive pulmonary disease, unspecified COPD type (H) 4/28/2021     Depressive disorder      Hypertension       Patient Active Problem List   Diagnosis     CARDIOVASCULAR SCREENING; LDL GOAL LESS THAN 160     Chronic pain syndrome     Chronic obstructive pulmonary disease, unspecified COPD type (H)     Personal history of tobacco use     Spondylolisthesis of lumbosacral region     Past Surgical History:  Pertinent surgical history reviewed.   No past surgical history on file.     Medications: Pertinent medications reviewed and updated  Current Outpatient Medications   Medication Sig Dispense Refill     albuterol (ACCUNEB) 1.25 MG/3ML neb solution Take 1 vial (1.25 mg) by nebulization every 6 hours as needed for shortness of breath / dyspnea or wheezing 3 mL 0     albuterol (PROAIR HFA/PROVENTIL HFA/VENTOLIN HFA) 108 (90 Base) MCG/ACT inhaler Inhale 2 puffs into the lungs every 6 hours 6.7 g 0     buPROPion (WELLBUTRIN XL) 300 MG 24 hr tablet        citalopram (CELEXA) 40 MG tablet TK 1 T PO QD       fluticasone (FLOVENT HFA) 110 MCG/ACT inhaler Inhale 1 puff into the lungs 2 times daily 12 g 2     gabapentin (NEURONTIN) 300 MG capsule TAKE 1 CAPSULE(300 MG) BY MOUTH THREE TIMES DAILY 90 capsule 0     nicotine (NICORETTE) 2 MG gum Place 1 each (2 mg) inside cheek as needed for smoking cessation Place 1 each inside cheek as needed for smoking cessation. 50 each 1     rosuvastatin (CRESTOR) 20 MG tablet Take 1 tablet (20 mg) by mouth daily 90 tablet 1     traMADol (ULTRAM) 50 MG tablet Take 1 tablet (50 mg) by mouth four times a week 16 tablet 0      traZODone (DESYREL) 50 MG tablet Take 300 mg by mouth At Bedtime        triamterene-HCTZ (MAXZIDE) 75-50 MG tablet Take 1 tablet by mouth daily 90 tablet 0     zolpidem (AMBIEN) 5 MG tablet Take 5 mg by mouth nightly as needed for sleep       [place]     Allergies: Pertinent allergies reviewed   No Known Allergies    Family History:   family history includes Diabetes in his father, mother, and sister; Hypertension in his mother.    Social history:   Social History     Socioeconomic History     Marital status: Single     Spouse name: Not on file     Number of children: Not on file     Years of education: Not on file     Highest education level: Not on file   Occupational History     Not on file   Tobacco Use     Smoking status: Current Some Day Smoker     Packs/day: 0.50     Smokeless tobacco: Never Used   Substance and Sexual Activity     Alcohol use: Yes     Comment: occassionally     Drug use: Not Currently     Types: Marijuana     Sexual activity: Yes     Partners: Female   Other Topics Concern     Not on file   Social History Narrative     Not on file     Social Determinants of Health     Financial Resource Strain:      Difficulty of Paying Living Expenses:    Food Insecurity:      Worried About Running Out of Food in the Last Year:      Ran Out of Food in the Last Year:    Transportation Needs:      Lack of Transportation (Medical):      Lack of Transportation (Non-Medical):    Physical Activity:      Days of Exercise per Week:      Minutes of Exercise per Session:    Stress:      Feeling of Stress :    Social Connections:      Frequency of Communication with Friends and Family:      Frequency of Social Gatherings with Friends and Family:      Attends Gnosticism Services:      Active Member of Clubs or Organizations:      Attends Club or Organization Meetings:      Marital Status:    Intimate Partner Violence:      Fear of Current or Ex-Partner:      Emotionally Abused:       Physically Abused:      Sexually Abused:      Social History     Social History Narrative     Not on file     He lives in [place]. He is currently working. He works as a .  Smokin packs/week. Alcohol: occasional. Street drugs: cannabis, helpful for pain.     Review of Systems:  ROS   The 14 system ROS was reviewed from the intake questionnaire; results listed at end of note.    Physical Exam:  BP (!) 161/92   Pulse 82   SpO2 97%     Physical Exam   Constitutional: He is oriented to person, place, and time.  He appears well-developed and well-nourished. He is not in acute distress.   HENT:     Head: Normocephalic and atraumatic.     Eyes: Pupils are equal, round, and reactive to light. EOM are normal. No scleral icterus.     Neck: Normal range of motion. Neck supple.   Cardiovascular: Normal rate and regular rhythm.   Pulmonary/Chest:  NWOB. No respiratory distress.   Abdominal: deferred  Genitourinary: deferred  Neurological: He is alert and oriented to person, place, and time. CN II-XII grossly intact, coordination grossly normal.  Romberg test negative.  Skin: Skin is warm and dry. He is not diaphoretic.   Psychiatric: He has a normal mood and affect. His behavior is normal. Judgment and thought content normal.  MSK: Gait is normal, posture is slightly flexed at the waist    Lumbar Spine Exam:    There are tender points identified in the lumbar paraspinal musculature  There are not trigger points identified in the lumbar paraspinal musculature    Range of Motion Flexion: normal     Extension: reduced     Rotation: normal     Side-bending: reduced    There IS exacerbation of the patient's typical pain with rotation and extension of the lumbar spine to the left side and right side       Left  Right  Seated Slump test Negative Negative    Lower Extremity Manual Muscle Testing    Motor (0 to 5 scale):        Right         Left    Hip flexion (L1/2)            5              5    Knee extension  (L2,3,4)  5              5    Ankle dorsiflexion (L4/5)             5              5    Long toe extension (L5)              5              5    Ankle plantar flexion(S1)            5              5    Reflexes (0 to 4 scale):    Right    Left    Patellar (L4)              2           2    Achilles (S1)              2           2    Clonus:    Bilateral absent    Sensation:    Light touch: grossly intact    Imaging:  MR LUMBAR SPINE W/O CONTRAST 7/21/2021 3:42 PM     Provided History: Low back pain, > 6 wks; Spondylolisthesis of  lumbosacral region  ICD-10: Spondylolisthesis of lumbosacral region     Comparison: Radiograph dated 4/28/2021     Technique: Sagittal T1-weighted, sagittal T2-weighted, sagittal STIR,  sagittal diffusion-weighted, axial T2-weighted, and axial gradient  echo images of the lumbar spine were obtained without the  administration of intravenous contrast.     Findings: Counting down from C2, there are 5 lumbar-type vertebrae.   The tip of the conus medullaris is at L1. Normal alignment of the  lumbar vertebrae. No significant disc height narrowing at any level.  Normal bone marrow signal on STIR images. Epidural lipomatosis caudal  to L5-S1.  On a level by level basis:     T12-L1: No spinal canal or neuroforaminal stenosis.     L1-2: Facet arthropathy bilaterally. No spinal canal or neuroforaminal  stenosis.     L2-3: No spinal canal or neuroforaminal stenosis.     L3-4: No spinal canal or neuroforaminal stenosis.     L4-5: Bilateral facet arthropathy. Ligamentum flavum thickening. Mild  broad based disc bulge. No spinal canal or neuroforaminal stenosis.     L5-S1: Bilateral facet arthropathy. Focal right central/subarticular  disc protrusion contacting the traversing right S1 nerve root. Mild  left neural foraminal narrowing. Right neural foramen is pain. No  significant spinal canal stenosis.     No abnormality of the paraspinous soft tissues.                                                                       Impression:   1. Focal right central/subarticular disc protrusion at L5-S1  contacting the traversing right S1 nerve root.  2. No high-grade neural foraminal or spinal canal stenosis at any  level.    Assessment:    The patient is a 55 year old male with PMHx of HTN, depression, and COPD who was referred by Dr. Mayers for evaluation of chronic low back pain.  Based upon his reported symptoms, exam findings, and the absence of specific findings on available imaging, he likely has a combination of facet arthropathy mediated pain and overlying myofascial pain.  We will initiate therapy with intraarticular facet injections to address his facet arthropathy and a trial of cyclobenzaprine for overlying myofascial pain.  Pending his response to these therapies, alternative procedures or medications can be tried.    Visit Diagnoses:  1. Lumbar spondylosis  2. Facet arthropathy  3. Myofascial pain    Plan:  Work up:    MRI reviewed, nothing further at this time    Referrals:    None at this time    Medications:    Trial cyclobenzaprine 5-20 mg TID PRN for myofascial pain.  Will assess for benefit at follow up.     Therapies:    Deferred at this time, however referrals for chiropractic or acupuncture could be considered    Interventions:    Order placed for bilateral L4/5 and L5/S1 facet joint intraarticular injections    Follow up: 4-6 weeks following above listed injections    Thank you for the consult.    A total of 57 minutes was spent on chart review, ordering studies/procedures/medications, face to face interaction, care coordination, and documentation on the day of the patient's visit.  Greater than 50% of the time spent was in direct patient interaction and care.    Claudio Carlton MD    Department of Anesthesiology  Pain Management Division

## 2021-09-23 NOTE — PATIENT INSTRUCTIONS
Medications:    cyclobenzaprine (FLEXERIL) 10 MG tablet. Take 0.5-2 tablets (5-20 mg) by mouth 3 times daily as needed for muscle spasms.      *Please provide the clinic with a minium of 1 week notice, on all prescription refills.       Procedures:    Call to schedule your procedure: [phone], option #2    Bilateral lumbar 4/5 and lumbar 5/sacral1 facet joint intraarticular injection with steroid medication.      Your pre-procedure instructions are below, please call our clinic if you have any questions.        Recommended Follow up:      Follow up 4 weeks after the procedure.          Please call [phone], option #1 to schedule your clinic appointment if you don't already have an appointment scheduled.      Procedure Information related to COVID-19    Please call [phone] option #2 to schedule, reschedule, or cancel your procedure appointment.   Phones are answered Monday - Friday from 08:00 - 4:30pm.  Leave a voicemail with your name, birth date, and phone number if no one is available to take your call.     You will need to be tested for COVID-19 within 4 days (96 hours) of your procedure.  You will be called to schedule your COVID test by a central scheduling team. If you have not been contacted to schedule your test within 4 days of the scheduled procedure, call [phone]    Please be aware that the turn around time for the test is approximately 24-72 hours.   If your results are still pending the day of your procedure, you will be notified as soon as possible as the procedure may be cancelled.    Please note: You will only be contacted for positive and pending results.     The procedure center staff will call you several days before the procedure to review important information that you will need to know for the day of the procedure.   Please contact the clinic if you have further questions about this information.       Information related to Scheduling and Pre-Procedure Instructions:      If  you are having a Diagnostic procedure, you will be given a Pain Diary to document your pain relief.   Please fax the completed form to our office.   fax number is [phone]    If you must reschedule your procedure more than two times, you must follow up in clinic before rescheduling again.        Preparing for your procedure    CAUTION - FAILURE TO FOLLOW THESE PRE-PROCEDURE INSTRUCTIONS WILL RESULT IN YOUR PROCEDURE BEING RESCHEDULED.      Your procedure: Bilateral lumbar 4/5 and lumbar 5/sacral1 facet joint intraarticular injection with steroid medication            You must have a  take you home after your procedure. Transportation by taxi or para-transit is okay as long as you have a responsible adult accompany you. You must provide your 's full name and contact number at time of check in.     Fasting Protocol Please have nothing to eat and drink for 2 hours before the procedure.      Medications If you take any medications, DO NOT STOP. Take your medications as usual the day of your procedure with a sip of water AT LEAST 2 HOURS PRIOR TO ARRIVAL.    Antibiotics If you are currently taking antibiotics, you must complete the entire dose 7 days prior to your scheduled procedure. You must be clear of any signs or symptoms of infection. If you begin antibiotics, please contact our clinic for instructions.     Fever, Chills, or Rash If you experience a fever of higher than 100 degrees, chills, rash, or open wounds during the one week before your procedure, please call the clinic to see if you may proceed with your procedure.      Medication Hold List  **Patients under Cardiology/Neurology care should consult their provider prior to the pain procedure to verify pre-procedure medication instructions. The information below contains general guidelines.**        Blood Thinners If you are taking daily ASPIRIN, PLAVIX, OR OTHER BLOOD THINNERS SUCH AS COUMADIN/WARFARIN, we will need your prescribing  doctor to sign a release permitting you to stop these medications. Once approved by your prescribing doctor - STOP ALL BLOOD THINNERS BASED ON THE TIME TABLE BELOW PRIOR TO YOUR PROCEDURE. If you have been instructed to stop WARFARIN(COUMADIN), you must have an INR lab drawn the day before your procedure. . Your INR must be within normal limits before we can perform your injection. MEDICATIONS CAN BE RESTARTED AFTER YOUR PROCEDURE.    14 DAY HOLD  Ticlid (ticlopidine)    10 DAY HOLD  Effient (Prasugel)    3 DAY HOLD  Xarelto (rivaroxaban) 7 DAY HOLD  Anacin, Bufferin, Ecotrin, Excedrin, Aggrenox (Aspirin)  Brilinta (ticagrelor)  Coumadin (Warfarin)  Pradexa (Dabigatran)  Elmiron (Pentosan)  Plavix (Clopidogrel Bisulfate)  Pletal (Cilostazol)    24 HOUR HOLD  Lovenox (enoxaparin)  Agrylin (Anagrelide)        Non-steroidal Anti-inflammatories (NSAIDs) DO NOT TAKE any non-steroidal anti-inflammatory medications (NSAIDs) listed on the table below. MEDICATIONS CAN BE RESTARTED AFTER YOUR PROCEDURE. Celebrex is OK to take and does not need to be discontinued.     Medications to stop:  3 DAY HOLD  Advil, Motrin (Ibuprofen)  Arthrotec (diciofenac sodium/misoprostol)  Clinoril (Sulindac)  Indocin (Indomethacin)  Lodine (Etodolac)  Toradol (Ketorolac)  Vicoprofen (Hydrocodone and Ibuprofen)  Voltaren (Diclotenac)    14 DAY HOLD  Daypro (Oxaprozin)  Feldene (Piroxicam)   7 DAY HOLD  Aleve (Naproxen sodium)  Darvon compound (contains aspirin)  Naprosyn (Naproxen)  Norgesic Forte (contains aspirin)  Mobic (Meloxicam)  Oruvall (Ketoprofen)  Percodan (contains aspirin)  Relafen (Nabumetone)  Salsalate  Trilisate  Vitamin E (more than 400 mg per day)  Any medication containing aspirin                To speak with a nurse, schedule/reschedule/cancel a clinic appointment, or request a medication refill call: [phone]     You can also reach us by [employer]: [email or web address]

## 2021-09-23 NOTE — NURSING NOTE
"Patient presents with:  Consult: [facility] NEW, RM 12, pt reports, 8/10 pain in his lower back \"center of the back\"      Extreme Pain (8)     Pain Medications     Opioid Agonists Refills Start End     traMADol (ULTRAM) 50 MG tablet    0 9/9/2021     Sig - Route: Take 1 tablet (50 mg) by mouth four times a week - Oral    Class: E-Prescribe          What medications are you using for pain?   Gabapentin, tramadol    (New patients only) Have you been seen by another pain clinic/ provider? N/A       Pt interested in shots  Suman Mendoza, EMT    "

## 2021-10-05 ENCOUNTER — LAB (OUTPATIENT)
Dept: LAB | Facility: CLINIC | Age: 56
End: 2021-10-05
Attending: ANESTHESIOLOGY
Payer: COMMERCIAL

## 2021-10-05 DIAGNOSIS — Z11.59 ENCOUNTER FOR SCREENING FOR OTHER VIRAL DISEASES: ICD-10-CM

## 2021-10-05 PROCEDURE — U0003 INFECTIOUS AGENT DETECTION BY NUCLEIC ACID (DNA OR RNA); SEVERE ACUTE RESPIRATORY SYNDROME CORONAVIRUS 2 (SARS-COV-2) (CORONAVIRUS DISEASE [COVID-19]), AMPLIFIED PROBE TECHNIQUE, MAKING USE OF HIGH THROUGHPUT TECHNOLOGIES AS DESCRIBED BY CMS-2020-01-R: HCPCS | Mod: 90 | Performed by: PATHOLOGY

## 2021-10-05 PROCEDURE — U0005 INFEC AGEN DETEC AMPLI PROBE: HCPCS | Mod: 90 | Performed by: PATHOLOGY

## 2021-10-06 LAB — SARS-COV-2 RNA RESP QL NAA+PROBE: NEGATIVE

## 2021-10-07 ENCOUNTER — HOSPITAL ENCOUNTER (OUTPATIENT)
Facility: AMBULATORY SURGERY CENTER | Age: 56
Discharge: HOME | End: 2021-10-07
Attending: ANESTHESIOLOGY | Admitting: ANESTHESIOLOGY
Payer: COMMERCIAL

## 2021-10-07 ENCOUNTER — ANCILLARY PROCEDURE (OUTPATIENT)
Dept: RADIOLOGY | Facility: AMBULATORY SURGERY CENTER | Age: 56
End: 2021-10-07
Attending: ANESTHESIOLOGY
Payer: COMMERCIAL

## 2021-10-07 VITALS
TEMPERATURE: 98.2 F | SYSTOLIC BLOOD PRESSURE: 152 MMHG | BODY MASS INDEX: 35.7 KG/M2 | HEART RATE: 89 BPM | RESPIRATION RATE: 20 BRPM | WEIGHT: 241 LBS | OXYGEN SATURATION: 100 % | HEIGHT: 69 IN | DIASTOLIC BLOOD PRESSURE: 98 MMHG

## 2021-10-07 DIAGNOSIS — M47.816 LUMBAR SPONDYLOSIS: ICD-10-CM

## 2021-10-07 DIAGNOSIS — R52 PAIN: ICD-10-CM

## 2021-10-07 PROCEDURE — 64494 INJ PARAVERT F JNT L/S 2 LEV: CPT | Mod: 50 | Performed by: ANESTHESIOLOGY

## 2021-10-07 PROCEDURE — 64493 INJ PARAVERT F JNT L/S 1 LEV: CPT | Mod: 50 | Performed by: ANESTHESIOLOGY

## 2021-10-07 PROCEDURE — 64493 INJ PARAVERT F JNT L/S 1 LEV: CPT | Mod: 50

## 2021-10-07 RX ORDER — TRIAMCINOLONE ACETONIDE 40 MG/ML
INJECTION, SUSPENSION INTRA-ARTICULAR; INTRAMUSCULAR PRN
Status: DISCONTINUED | OUTPATIENT
Start: 2021-10-07 | End: 2021-10-07 | Stop reason: HOSPADM

## 2021-10-07 RX ORDER — BUPIVACAINE HYDROCHLORIDE 2.5 MG/ML
INJECTION, SOLUTION EPIDURAL; INFILTRATION; INTRACAUDAL PRN
Status: DISCONTINUED | OUTPATIENT
Start: 2021-10-07 | End: 2021-10-07 | Stop reason: HOSPADM

## 2021-10-07 RX ORDER — IOPAMIDOL 408 MG/ML
INJECTION, SOLUTION INTRATHECAL PRN
Status: DISCONTINUED | OUTPATIENT
Start: 2021-10-07 | End: 2021-10-07 | Stop reason: HOSPADM

## 2021-10-07 RX ORDER — LIDOCAINE HYDROCHLORIDE 10 MG/ML
INJECTION, SOLUTION EPIDURAL; INFILTRATION; INTRACAUDAL; PERINEURAL PRN
Status: DISCONTINUED | OUTPATIENT
Start: 2021-10-07 | End: 2021-10-07 | Stop reason: HOSPADM

## 2021-10-07 ASSESSMENT — MIFFLIN-ST. JEOR: SCORE: 1911.16

## 2021-10-07 NOTE — OP NOTE
Patient: Babar Bhatt Age: 55 year old   MRN: [id number] Attending: Dr. Lopez     Date of Visit: October 7, 2021      PAIN MEDICINE CLINIC PROCEDURE NOTE    ATTENDING CLINICIAN:    William Lopez MD    ASSISTANT CLINICIAN:  Lyle Davalos DO    PREPROCEDURE DIAGNOSES:  1.  Low back pain  2.  Lumbar facet arthropathy      PROCEDURE(S) PERFORMED:  1.  Bilateral lumbar L4-5, and L5-S1 facet joint injections.   2.  Fluoroscopic guidance for the above-named procedure(s)      ANESTHESIA:  Local.    BLOOD LOSS:  Minimal.    DRAINS AND SPECIMENS:  None.    COMPLICATIONS:  None.    INDICATIONS:  Babar Bhatt is a 55 year old male with a history of low back pain.  The patient stated that he was in usual state of health and denied recent anticoagulant use or recent infections.  Therefore, the plan is to perform above mentioned procedure.     Procedure Details:  The patient was met in the procedure room, where the patient was identified by name, medical record number and date of birth.  All of the patient s last minute questions were answered. Written informed consent was obtained and saved in the electronic medical record, after the risks, benefits, and alternatives were discussed with the patient.      A formal time-out procedure was performed by Dr. Lopez, as per protocol, including patient name, title of procedure, and site of procedure, and all in the room concurred.  Routine monitors were applied.      The patient was placed in the prone position on the procedure room table.  All pressure points were checked and comfortably padded.  Routine monitors were placed.  Vital signs were stable.    A chlorhexidine prep was completed followed by sterile draping per standard procedure.     The anatomical target site was determined using fluoroscopy by squaring off the superior endplate. AP fluoroscopic guidance was used to identify the right L4-L5 facet joint. Then ipsilateral oblique tilt was used  in order to maximally  visualize the joint space.  The skin overlying these joints was anesthetized with 1% lidocaine, using a 25 gauge, 1.5 inch needle.  Next, a 3.5 inch spinal needle was introduced and advanced through the anesthetized tract and advanced to the joint space at the above-named level.  Appropriate depth was confirmed with lateral fluoroscopic guidance.  This same procedure was repeated at the right L5-S1, left L4-5 and left L5-S1 facet joints    After negative aspiration for heme, CSF, or air, 1 mL of a treatment mixture containing 1 mL of triamcinolone and 5 mL of bupivacaine 0.25% was injected at each of the above named levels.  Addition 1 ml of the solution was injected into the periarticular space. The needles were then removed.     Light pressure was held at the puncture site(s) to prevent ecchymosis and oozing.  The patient's skin was cleansed, and hemostasis was confirmed.  Band-aids were applied to the needle injection site(s).      Condition:    The patient remained awake and alert throughout the procedure.  The patient tolerated the procedure well and was monitored for approximately 15 minutes afterward in the post procedure area.  There were no immediate post procedure complications noted.  The patient was then discharged to home as per protocol.  The patient will follow up in the outpatient clinic in 4 week(s), unless otherwise clinically indicated.    Pre-procedure pain score: 7/10  Pos-procedure pain score: 0/10    Patient condition  stable.

## 2021-10-07 NOTE — DISCHARGE INSTRUCTIONS
PAIN INJECTION HOME CARE INSTRUCTIONS  Activity  -You may resume most normal activity levels with the exception of strenuous activity. It may help to move in ways that hurt before the injection, to see if the pain is still there, but do not overdo it. Take it easy for the rest of the day.    -DO NOT remove bandaid for 24 hours  -DO NOT shower for 24 hours      Pain  -You may feel immediate pain relief and numbness for a period of time after the injection. This may indicate the medication has reached the right spot.  -Your pain may return after this short pain-free period, or may even be a little worse for a day or two. It may be caused by needle irritation or by the medication itself. The medications usually take two or three days to start working, but can take as long as a week.    -You may use an ice pack for 20 minutes every 2 hours for the first 24 hours  -You may use a heating pad after the first 24 hours  -You may use Tylenol (acetaminophen) every 4 hours or other pain medicines as directed by your physician        DID YOU RECEIVE STEROIDS TODAY?  YES    Common side effects of steroids:  Not everyone will experience corticosteroid side effects. If side effects are experienced, they will gradually subside in the 7-10 day period following an injection. Most common side effects include:  -Flushed face and/or chest  -Feeling of warmth, particularly in the face but could be an overall feeling of warmth  -Increased blood sugar in diabetic patients  -Menstrual irregularities my occur. If taking hormone-based birth control an alternate method of birth control is recommended  -Sleep disturbances and/or mood swings are possible  -Leg cramps    PLEASE KEEP TRACK OF YOUR SYMPTOMS AND NOTE ANY CHANGES FOR YOUR DOCTOR.       Please contact us if you have:  -Severe pain  -Fever more than 101.5 degrees Fahrenheit  -Signs of infection at the injection site (redness, swelling, or drainage)    If you have questions, please  contact the Pain Clinic at [phone] Option #1 between the hours of 7:00 am and 3:00 pm Monday through Friday. After office hours you can contact the on call provider by dialing [phone]. If you need immediate attention, we recommend that you go to a hospital emergency room or dial [phone].    For patients seen by the PM and R service, please call [phone].    If you have procedure scheduling questions please call [phone] Option #2

## 2021-10-19 DIAGNOSIS — M47.816 SPONDYLOSIS OF LUMBAR REGION WITHOUT MYELOPATHY OR RADICULOPATHY: ICD-10-CM

## 2021-10-19 NOTE — TELEPHONE ENCOUNTER
Patient calling for Gabapentin refill     Routing refill request to provider for review/approval because:  Drug not on the [facility] refill protocol     Janay GUZMAN RN

## 2021-10-20 RX ORDER — GABAPENTIN 300 MG/1
CAPSULE ORAL
Qty: 90 CAPSULE | Refills: 0 | Status: SHIPPED | OUTPATIENT
Start: 2021-10-20 | End: 2021-11-18

## 2021-11-03 ENCOUNTER — ANCILLARY PROCEDURE (OUTPATIENT)
Dept: GENERAL RADIOLOGY | Facility: CLINIC | Age: 56
End: 2021-11-03
Attending: STUDENT IN AN ORGANIZED HEALTH CARE EDUCATION/TRAINING PROGRAM
Payer: COMMERCIAL

## 2021-11-03 ENCOUNTER — PRE VISIT (OUTPATIENT)
Dept: PULMONOLOGY | Facility: CLINIC | Age: 56
End: 2021-11-03

## 2021-11-03 ENCOUNTER — VIRTUAL VISIT (OUTPATIENT)
Dept: PULMONOLOGY | Facility: CLINIC | Age: 56
End: 2021-11-03
Attending: FAMILY MEDICINE
Payer: COMMERCIAL

## 2021-11-03 DIAGNOSIS — J44.9 CHRONIC OBSTRUCTIVE PULMONARY DISEASE, UNSPECIFIED COPD TYPE (H): ICD-10-CM

## 2021-11-03 DIAGNOSIS — Z72.0 CURRENT TOBACCO USE: ICD-10-CM

## 2021-11-03 DIAGNOSIS — J43.2 CENTRILOBULAR EMPHYSEMA (H): Primary | ICD-10-CM

## 2021-11-03 DIAGNOSIS — J43.2 CENTRILOBULAR EMPHYSEMA (H): ICD-10-CM

## 2021-11-03 PROCEDURE — 94375 RESPIRATORY FLOW VOLUME LOOP: CPT | Performed by: INTERNAL MEDICINE

## 2021-11-03 PROCEDURE — 99442 PR PHYSICIAN TELEPHONE EVALUATION 11-20 MIN: CPT | Mod: 25 | Performed by: STUDENT IN AN ORGANIZED HEALTH CARE EDUCATION/TRAINING PROGRAM

## 2021-11-03 PROCEDURE — 94726 PLETHYSMOGRAPHY LUNG VOLUMES: CPT | Performed by: INTERNAL MEDICINE

## 2021-11-03 PROCEDURE — 94729 DIFFUSING CAPACITY: CPT | Performed by: INTERNAL MEDICINE

## 2021-11-03 PROCEDURE — 71046 X-RAY EXAM CHEST 2 VIEWS: CPT | Mod: GC | Performed by: RADIOLOGY

## 2021-11-03 RX ORDER — BUDESONIDE AND FORMOTEROL FUMARATE DIHYDRATE 160; 4.5 UG/1; UG/1
2 AEROSOL RESPIRATORY (INHALATION) 2 TIMES DAILY
Qty: 10.2 G | Refills: 11 | Status: SHIPPED | OUTPATIENT
Start: 2021-11-03 | End: 2022-05-12

## 2021-11-03 NOTE — LETTER
11/3/2021         RE: Babar Bhatt  [address] [facility] [address]        Dear Colleague,    Thank you for referring your patient, Babar Bhatt, to the [facility] [place]. Please see a copy of my visit note below.    Babar is a 55 year old who is being evaluated via a billable telephone visit.      What phone number would you like to be contacted at? [phone]  How would you like to obtain your AVS? Mail a copy        Babar is a 55 year old who is being evaluated via a billable telephone visit.      What phone number would you like to be contacted at? [phone]   How would you like to obtain your AVS? Mail a copy  Phone call duration: 12 minutes    Pulmonary Clinic Initial Visit Note      Assessment and Plan:  Babar Bhatt is a 55 year old male with COPD, HTN, HLD, depression who is referred for COPD evaluation/management.    # COPD/asthma:   Given BD response in outside 2017 PFTs and onset of lung disease potentially in his 40s with relatively low pack-years, suspect he has asthma in addition to COPD. Will also plan to get alpha 1 antitrypsin level at next visit. Currently he's on lower dose ICS and with sub-optimal control, so will switch to ICS/LABA.    # Tobacco use: down to 2 cigarettes daily, did not like NRT gum, wants to quit cold turkey  # Lung nodules: < 4mm, continue annual LDCT -- due summer 2022  # Immunizations: s/p COVID x 2,  needs influenza, PPSV23 - rec'd he get this at PCP appt next week    RTC 6 mo    Seen and staffed with Dr. Seth Newell MD  Pulmonary and Critical Care Fellow    _________________________________________________________________    CC: emphysema    HPI:   Reports only dyspnea on very heavy exertion but could walk a mile or take two flights of stairs without a break. He does report a frequent cough with clear sputum. He was started on Flovent by PCP in April with minimal response but  does get benefit from albuterol which he takes multiple times a day. He denies ever having had to take prednisone and denies childhood lung disease/infections, although he does note a possible diagnosis of lung disease in his 40s. He is down to 2 cigarettes daily and plans to quit cold turkey particularly as he did not enjoy the nicotine gum he tried.    PMH:  Past Medical History:   Diagnosis Date     Chronic obstructive pulmonary disease, unspecified COPD type (H) 4/28/2021     Depressive disorder      Hypertension      Lumbar spondylosis 9/23/2021     Allergies:  No Known Allergies    Social History:  Tobacco: (?)20 years x 1/3 ppd (chart notes he started in 1980 which would make it more like 50 years x 1/3 ppd)  + MJ (smoke), denies any other drug use - inhalational or otherwise  EtOH: twice a week  Works as a   1 cat    Medications:  Current Outpatient Medications   Medication Sig Dispense Refill     albuterol (ACCUNEB) 1.25 MG/3ML neb solution Take 1 vial (1.25 mg) by nebulization every 6 hours as needed for shortness of breath / dyspnea or wheezing 3 mL 0     albuterol (PROAIR HFA/PROVENTIL HFA/VENTOLIN HFA) 108 (90 Base) MCG/ACT inhaler Inhale 2 puffs into the lungs every 6 hours 6.7 g 0     buPROPion (WELLBUTRIN XL) 300 MG 24 hr tablet        citalopram (CELEXA) 40 MG tablet TK 1 T PO QD       fluticasone (FLOVENT HFA) 110 MCG/ACT inhaler Inhale 1 puff into the lungs 2 times daily 12 g 2     gabapentin (NEURONTIN) 300 MG capsule TAKE 1 CAPSULE(300 MG) BY MOUTH THREE TIMES DAILY 90 capsule 0     nicotine (NICORETTE) 2 MG gum Place 1 each (2 mg) inside cheek as needed for smoking cessation Place 1 each inside cheek as needed for smoking cessation. 50 each 1     rosuvastatin (CRESTOR) 20 MG tablet Take 1 tablet (20 mg) by mouth daily 90 tablet 1     traMADol (ULTRAM) 50 MG tablet Take 1 tablet (50 mg) by mouth four times a week 16 tablet 0     traZODone (DESYREL) 50 MG tablet Take 300 mg by mouth At  Bedtime        triamterene-HCTZ (MAXZIDE) 75-50 MG tablet Take 1 tablet by mouth daily 90 tablet 0     zolpidem (AMBIEN) 5 MG tablet Take 5 mg by mouth nightly as needed for sleep       Family History:  Family History   Problem Relation Age of Onset     Hypertension Mother      Diabetes Mother      Diabetes Father      Diabetes Sister        ROS: Complete 10 point ROS negative unless mentioned in HPI    Physical Exam:  No Exam /vitals - phone visit    Labs and Radiology:  CT Chest 7/2021  1. ACR Assessment Category (v1.1):  Lung-RADS Category 2. Benign  appearance or behavior.    Recommendation:  Lung-RADS Category 2. Benign appearance or behavior.  Recommendation:  continue annual screening with Lung cancer screening  CT (please order exam code [id number]).   2. Significant Incidental Finding(s):  Category S: No.  3. Any moderate or severe Emphysema or bronchial wall thickening or  mosaic attenuation? No  4. Avoidance of tobacco smoke is strongly advised. Please consider  referral for smoking cessation to [facility] Medication Therapy Management  (MTM) if clinically appropriate.    CXR 11/3/21 - grossly clear lung fields, some diaphragmatic flattening    PFTs 11/3/21 moderately severe obstruction, air trapping, normal diffusion  PFTs 2017 moderately severe obst with significant BD response, air trapping      Attestation signed by Alejandra Ann MD at 11/4/2021 12:13 PM:  Physician Attestation   I, Alejandra Ann MD, saw and discussed this patient with the resident/fellow.  I agree with the resident/fellow findings and plan of care as documented in their note. I have personally reviewed today's vital signs, medications, laboratory results and imaging results.    Key findings: 55 yom with frequent cough productive of clear sputum and ongoing daily tobacco use.  Down to 2 cigs per day.  Cessation encouraged.  Historical PFTs have shown positive bronchodilator response raising question of possible asthma component.   Will escalate to high dose LABA/ICS to cover for asthma and COPD.  Get A1AT testing.    Alejandra Ann  Date of Service (when I saw the patient): 11/3/21          Again, thank you for allowing me to participate in the care of your patient.        Sincerely,        Yuli Newell MD

## 2021-11-03 NOTE — PROGRESS NOTES
Babar is a 55 year old who is being evaluated via a billable telephone visit.      What phone number would you like to be contacted at? [phone]   How would you like to obtain your AVS? Mail a copy  Phone call duration: 12 minutes    Pulmonary Clinic Initial Visit Note      Assessment and Plan:  Babar Bhatt is a 55 year old male with COPD, HTN, HLD, depression who is referred for COPD evaluation/management.    # COPD/asthma:   Given BD response in outside 2017 PFTs and onset of lung disease potentially in his 40s with relatively low pack-years, suspect he has asthma in addition to COPD. Will also plan to get alpha 1 antitrypsin level at next visit. Currently he's on lower dose ICS and with sub-optimal control, so will switch to ICS/LABA.    # Tobacco use: down to 2 cigarettes daily, did not like NRT gum, wants to quit cold turkey  # Lung nodules: < 4mm, continue annual LDCT -- due summer 2022  # Immunizations: s/p COVID x 2,  needs influenza, PPSV23 - rec'd he get this at PCP appt next week    RTC 6 mo    Seen and staffed with Dr. Seth Newell MD  Pulmonary and Critical Care Fellow    _________________________________________________________________    CC: emphysema    HPI:   Reports only dyspnea on very heavy exertion but could walk a mile or take two flights of stairs without a break. He does report a frequent cough with clear sputum. He was started on Flovent by PCP in April with minimal response but does get benefit from albuterol which he takes multiple times a day. He denies ever having had to take prednisone and denies childhood lung disease/infections, although he does note a possible diagnosis of lung disease in his 40s. He is down to 2 cigarettes daily and plans to quit cold turkey particularly as he did not enjoy the nicotine gum he tried.    PMH:  Past Medical History:   Diagnosis Date     Chronic obstructive pulmonary disease, unspecified COPD type (H) 4/28/2021     Depressive  disorder      Hypertension      Lumbar spondylosis 9/23/2021     Allergies:  No Known Allergies    Social History:  Tobacco: (?)20 years x 1/3 ppd (chart notes he started in 1980 which would make it more like 50 years x 1/3 ppd)  + MJ (smoke), denies any other drug use - inhalational or otherwise  EtOH: twice a week  Works as a   1 cat    Medications:  Current Outpatient Medications   Medication Sig Dispense Refill     albuterol (ACCUNEB) 1.25 MG/3ML neb solution Take 1 vial (1.25 mg) by nebulization every 6 hours as needed for shortness of breath / dyspnea or wheezing 3 mL 0     albuterol (PROAIR HFA/PROVENTIL HFA/VENTOLIN HFA) 108 (90 Base) MCG/ACT inhaler Inhale 2 puffs into the lungs every 6 hours 6.7 g 0     buPROPion (WELLBUTRIN XL) 300 MG 24 hr tablet        citalopram (CELEXA) 40 MG tablet TK 1 T PO QD       fluticasone (FLOVENT HFA) 110 MCG/ACT inhaler Inhale 1 puff into the lungs 2 times daily 12 g 2     gabapentin (NEURONTIN) 300 MG capsule TAKE 1 CAPSULE(300 MG) BY MOUTH THREE TIMES DAILY 90 capsule 0     nicotine (NICORETTE) 2 MG gum Place 1 each (2 mg) inside cheek as needed for smoking cessation Place 1 each inside cheek as needed for smoking cessation. 50 each 1     rosuvastatin (CRESTOR) 20 MG tablet Take 1 tablet (20 mg) by mouth daily 90 tablet 1     traMADol (ULTRAM) 50 MG tablet Take 1 tablet (50 mg) by mouth four times a week 16 tablet 0     traZODone (DESYREL) 50 MG tablet Take 300 mg by mouth At Bedtime        triamterene-HCTZ (MAXZIDE) 75-50 MG tablet Take 1 tablet by mouth daily 90 tablet 0     zolpidem (AMBIEN) 5 MG tablet Take 5 mg by mouth nightly as needed for sleep       Family History:  Family History   Problem Relation Age of Onset     Hypertension Mother      Diabetes Mother      Diabetes Father      Diabetes Sister        ROS: Complete 10 point ROS negative unless mentioned in HPI    Physical Exam:  No Exam /vitals - phone visit    Labs and Radiology:  CT Chest 7/2021  1. ACR  Assessment Category (v1.1):  Lung-RADS Category 2. Benign  appearance or behavior.    Recommendation:  Lung-RADS Category 2. Benign appearance or behavior.  Recommendation:  continue annual screening with Lung cancer screening  CT (please order exam code [id number]).   2. Significant Incidental Finding(s):  Category S: No.  3. Any moderate or severe Emphysema or bronchial wall thickening or  mosaic attenuation? No  4. Avoidance of tobacco smoke is strongly advised. Please consider  referral for smoking cessation to [facility] Medication Therapy Management  (MTM) if clinically appropriate.    CXR 11/3/21 - grossly clear lung fields, some diaphragmatic flattening    PFTs 11/3/21 moderately severe obstruction, air trapping, normal diffusion  PFTs 2017 moderately severe obst with significant BD response, air trapping

## 2021-11-03 NOTE — PROGRESS NOTES
Babar is a 55 year old who is being evaluated via a billable telephone visit.      What phone number would you like to be contacted at? [phone]  How would you like to obtain your AVS? Mail a copy

## 2021-11-05 ENCOUNTER — TELEPHONE (OUTPATIENT)
Dept: PULMONOLOGY | Facility: CLINIC | Age: 56
End: 2021-11-05

## 2021-11-05 LAB
DLCOUNC-%PRED-PRE: 96 %
DLCOUNC-PRE: 26.19 ML/MIN/MMHG
DLCOUNC-PRED: 27.04 ML/MIN/MMHG
ERV-%PRED-PRE: 47 %
ERV-PRE: 0.33 L
ERV-PRED: 0.69 L
EXPTIME-PRE: 10.33 SEC
FEF2575-%PRED-PRE: 30 %
FEF2575-PRE: 0.96 L/SEC
FEF2575-PRED: 3.11 L/SEC
FEFMAX-%PRED-PRE: 49 %
FEFMAX-PRE: 4.56 L/SEC
FEFMAX-PRED: 9.16 L/SEC
FEV1-%PRED-PRE: 57 %
FEV1-PRE: 2.03 L
FEV1FEV6-PRE: 62 %
FEV1FEV6-PRED: 80 %
FEV1FVC-PRE: 58 %
FEV1FVC-PRED: 78 %
FEV1SVC-PRE: 58 %
FEV1SVC-PRED: 74 %
FIFMAX-PRE: 6.18 L/SEC
FRCPLETH-%PRED-PRE: 110 %
FRCPLETH-PRE: 3.86 L
FRCPLETH-PRED: 3.49 L
FVC-%PRED-PRE: 77 %
FVC-PRE: 3.51 L
FVC-PRED: 4.55 L
IC-%PRED-PRE: 76 %
IC-PRE: 3.15 L
IC-PRED: 4.13 L
RVPLETH-%PRED-PRE: 154 %
RVPLETH-PRE: 3.53 L
RVPLETH-PRED: 2.28 L
TLCPLETH-%PRED-PRE: 102 %
TLCPLETH-PRE: 7.01 L
TLCPLETH-PRED: 6.82 L
VA-%PRED-PRE: 87 %
VA-PRE: 5.46 L
VC-%PRED-PRE: 72 %
VC-PRE: 3.48 L
VC-PRED: 4.82 L

## 2021-11-05 NOTE — TELEPHONE ENCOUNTER
Spoke with pt regarding scheduling 6 month follow up for beginning of May with Dr. Newell after having a recent appt; appt scheduled and details confirmed with pt who requested hard copy of itinerary be mailed to his home; mailing address verified.

## 2021-11-18 DIAGNOSIS — J44.9 CHRONIC OBSTRUCTIVE PULMONARY DISEASE, UNSPECIFIED COPD TYPE (H): ICD-10-CM

## 2021-11-19 RX ORDER — ALBUTEROL SULFATE 90 UG/1
2 AEROSOL, METERED RESPIRATORY (INHALATION) EVERY 6 HOURS
Qty: 6.7 G | Refills: 0 | Status: SHIPPED | OUTPATIENT
Start: 2021-11-19 | End: 2021-12-20

## 2021-11-23 ENCOUNTER — TELEPHONE (OUTPATIENT)
Dept: PULMONOLOGY | Facility: CLINIC | Age: 56
End: 2021-11-23
Payer: COMMERCIAL

## 2021-11-23 NOTE — CONFIDENTIAL NOTE
Prior Authorization Retail Medication Request    Medication/Dose: Budesonide/Form 160/4.5MCG INH  ICD code (if different than what is on RX):      Previously Tried and Failed:    Rationale:      Insurance Name:    Insurance ID:        Pharmacy Information (if different than what is on RX)  Name:    Phone:

## 2021-11-24 NOTE — TELEPHONE ENCOUNTER
Central Prior Authorization Team   Phone: [phone]    PA Initiation    Medication: Budesonide/Form 160/4.5MCG INH  Insurance Company: [employer] - Phone [phone] Fax [phone]  Pharmacy Filling the Rx: [id number] - [place], [place] - [address] AT [facility] [address]  Filling Pharmacy Phone: [phone]  Filling Pharmacy Fax: [phone]  Start Date: 11/24/2021

## 2021-11-24 NOTE — TELEPHONE ENCOUNTER
Prior Authorization Not Needed per Insurance    Medication: Budesonide/Form 160/4.5MCG INH-PA NOT NEEDED   Insurance Company: [employer] - Phone [phone] Fax [phone]  Expected CoPay:      Pharmacy Filling the Rx: [facility] #[id number] - [place], [place] - [address]  Pharmacy Notified: Yes  Patient Notified: No    Called pharmacy and pharmacy stated that PA is Not Needed and Brand Symbicort is covered. Pharmacy stated that they have a paid claim on medication on 11/4/2021 quantity 1 inhaler for 30 day supply and next fill date is 11/30/2021. [facility] also stated that PA is Not Needed and Brand Symbicort it covered.

## 2022-01-11 DIAGNOSIS — J44.9 CHRONIC OBSTRUCTIVE PULMONARY DISEASE, UNSPECIFIED COPD TYPE (H): ICD-10-CM

## 2022-01-12 RX ORDER — ALBUTEROL SULFATE 90 UG/1
2 AEROSOL, METERED RESPIRATORY (INHALATION) EVERY 6 HOURS
Qty: 6.7 G | Refills: 0 | Status: SHIPPED | OUTPATIENT
Start: 2022-01-12 | End: 2022-02-14

## 2022-01-12 NOTE — TELEPHONE ENCOUNTER
"Requested Prescriptions   Signed Prescriptions Disp Refills    albuterol (PROAIR HFA/PROVENTIL HFA/VENTOLIN HFA) 108 (90 Base) MCG/ACT inhaler 6.7 g 0     Sig: Inhale 2 puffs into the lungs every 6 hours       Asthma Maintenance Inhalers - Anticholinergics Passed - 1/11/2022  4:52 PM        Passed - Patient is age 12 years or older        Passed - Recent (12 mo) or future (30 days) visit within the authorizing provider's specialty     Patient has had an office visit with the authorizing provider or a provider within the authorizing providers department within the previous 12 mos or has a future within next 30 days. See \"Patient Info\" tab in inbasket, or \"Choose Columns\" in Meds & Orders section of the refill encounter.              Passed - Medication is active on med list       Short-Acting Beta Agonist Inhalers Protocol  Passed - 1/11/2022  4:52 PM        Passed - Patient is age 12 or older        Passed - Recent (12 mo) or future (30 days) visit within the authorizing provider's specialty     Patient has had an office visit with the authorizing provider or a provider within the authorizing providers department within the previous 12 mos or has a future within next 30 days. See \"Patient Info\" tab in inbasket, or \"Choose Columns\" in Meds & Orders section of the refill encounter.              Passed - Medication is active on med list           Sonia Hinton RN  [facility]     "

## 2022-01-17 ENCOUNTER — OFFICE VISIT (OUTPATIENT)
Dept: FAMILY MEDICINE | Facility: CLINIC | Age: 57
End: 2022-01-17
Payer: COMMERCIAL

## 2022-01-17 VITALS
SYSTOLIC BLOOD PRESSURE: 133 MMHG | HEART RATE: 92 BPM | BODY MASS INDEX: 34.64 KG/M2 | WEIGHT: 231.2 LBS | DIASTOLIC BLOOD PRESSURE: 87 MMHG | OXYGEN SATURATION: 97 % | TEMPERATURE: 98.9 F

## 2022-01-17 DIAGNOSIS — Z12.11 ENCOUNTER FOR SCREENING FOR MALIGNANT NEOPLASM OF COLON: ICD-10-CM

## 2022-01-17 DIAGNOSIS — M47.26 OSTEOARTHRITIS OF SPINE WITH RADICULOPATHY, LUMBAR REGION: Primary | ICD-10-CM

## 2022-01-17 DIAGNOSIS — E78.5 HYPERLIPIDEMIA LDL GOAL <130: ICD-10-CM

## 2022-01-17 DIAGNOSIS — E78.5 HYPERLIPIDEMIA WITH TARGET LDL LESS THAN 100: ICD-10-CM

## 2022-01-17 PROCEDURE — 99214 OFFICE O/P EST MOD 30 MIN: CPT | Performed by: FAMILY MEDICINE

## 2022-01-17 RX ORDER — GABAPENTIN 300 MG/1
300 CAPSULE ORAL 4 TIMES DAILY
Qty: 120 CAPSULE | Refills: 4 | Status: SHIPPED | OUTPATIENT
Start: 2022-01-17 | End: 2022-06-09

## 2022-01-17 RX ORDER — ROSUVASTATIN CALCIUM 20 MG/1
20 TABLET, COATED ORAL DAILY
Qty: 90 TABLET | Refills: 1 | Status: SHIPPED | OUTPATIENT
Start: 2022-01-17 | End: 2022-08-11

## 2022-01-17 NOTE — PROGRESS NOTES
Assessment & Plan     Osteoarthritis of spine with radiculopathy, lumbar region  Assessment: Clinical presentation today is consistent with his history of lumbar disc herniation.  Patient was advised to schedule an appointment with the pain clinic for corticosteroid injections.  Plan:  - gabapentin (NEURONTIN) 300 MG capsule; Take 1 capsule (300 mg) by mouth 4 times daily    Hyperlipidemia with target LDL less than 100  Assessment: Repeat lipid panel is pending.  Advised him to continue with Crestor 20 mg once daily for now.  Plan:  - rosuvastatin (CRESTOR) 20 MG tablet; Take 1 tablet (20 mg) by mouth daily  - Comprehensive metabolic panel (BMP + Alb, Alk Phos, ALT, AST, Total. Bili, TP); Future  - Lipid panel reflex to direct LDL Fasting; Future  - CBC with platelets; Future    Encounter for screening for malignant neoplasm of colon  - Adult Gastro Ref - Procedure Only; Future     Return in about 3 months (around 4/17/2022).    Nabila Newby MD  [facility]    Margo Eckert is a 56 year old who presents for the following health issues:    History of Present Illness       Back Pain:  He presents for follow up of back pain. Patient's back pain is a chronic problem.  Location of back pain:  Right lower back, left lower back, right middle of back, left middle of back, right hip and left hip  Description of back pain: sharp, shooting and stabbing  Back pain spreads: left buttocks, right thigh and left thigh    Since patient first noticed back pain, pain is: gradually worsening  Does back pain interfere with his job:  Yes      He eats 0-1 servings of fruits and vegetables daily.He consumes 1 sweetened beverage(s) daily.He exercises with enough effort to increase his heart rate 9 or less minutes per day.  He is missing 2 dose(s) of medications per week.     56-year-old with past medical history of lumbar disc herniation who presents to clinic for evaluation of lower back pain.  The patient  was seen and evaluated at the pain clinic and he received corticosteroid injections into the lumbar spine 3 months ago.  The patient is currently taking gabapentin for pain control.    Pain History:  When did you first notice your pain? - More than 6 weeks   Have you seen this provider for your pain in the past?   Yes   Where in your body do you have pain? Lower Back pain   Are you seeing anyone else for your pain? No    PDMP Review       Value Time User    State PDMP site checked  Yes 9/8/2021  9:44 AM Nabila Newby MD        Last CSA Agreement:   CSA -- Patient Level:    Controlled Substance Agreement - Opioid - Scan on 4/8/2021 11:13 AM       Last UDS: 8/23/2021    Review of Systems   Constitutional, HEENT, cardiovascular, pulmonary, gi and gu systems are negative, except as otherwise noted.      Objective    /87   Pulse 92   Temp 98.9  F (37.2  C) (Temporal)   Wt 104.9 kg (231 lb 3.2 oz)   SpO2 97%   BMI 34.64 kg/m    Body mass index is 34.64 kg/m .  Physical Exam   GENERAL: healthy, alert and no distress  RESP: lungs clear to auscultation - no rales, rhonchi or wheezes  CV: regular rate and rhythm, normal S1 S2, no S3 or S4, no murmur, click or rub, no peripheral edema and peripheral pulses strong  Comprehensive back pain exam:  Tenderness of lumbar spine, Pain limits the following motions: extension or flexion, Lower extremity strength functional and equal on both sides and Straight leg positive on  left, indicating possible ipsilateral radiculopathy    No results found for any visits on 01/17/22.

## 2022-01-18 ENCOUNTER — TELEPHONE (OUTPATIENT)
Dept: FAMILY MEDICINE | Facility: CLINIC | Age: 57
End: 2022-01-18
Payer: COMMERCIAL

## 2022-01-18 ENCOUNTER — TELEPHONE (OUTPATIENT)
Dept: ANESTHESIOLOGY | Facility: CLINIC | Age: 57
End: 2022-01-18
Payer: COMMERCIAL

## 2022-01-18 DIAGNOSIS — M47.816 LUMBAR FACET ARTHROPATHY: Primary | ICD-10-CM

## 2022-01-18 NOTE — TELEPHONE ENCOUNTER
Prior Authorization Retail Medication Request    Medication/Dose: rosuvastatin (CRESTOR) 20 MG tablet  ICD code (if different than what is on RX):  unknown  Previously Tried and Failed:  unknown  Rationale:  unknown    Insurance Name:  unknown  Insurance ID:  [id number]      Pharmacy Information (if different than what is on RX)  Name:  [facility]  Phone:  [phone]

## 2022-01-18 NOTE — TELEPHONE ENCOUNTER
Patient called in requesting to schedule a cortisone. This writer informed patient a new order needs to be placed. This writer stated I will send a message up to the pain clinic for an order to be placed and then patient will be contacted to schedule.

## 2022-01-19 ENCOUNTER — TELEPHONE (OUTPATIENT)
Dept: ANESTHESIOLOGY | Facility: CLINIC | Age: 57
End: 2022-01-19
Payer: COMMERCIAL

## 2022-01-19 PROBLEM — M47.816 LUMBAR SPONDYLOSIS: Status: ACTIVE | Noted: 2021-09-23

## 2022-01-21 NOTE — TELEPHONE ENCOUNTER
Allyson from [facility] called and stated that a PA is not needed. Insurance covers rosuvastatin 40mg taking one-half tablet daily. Pharmacy will need a new script.

## 2022-01-21 NOTE — TELEPHONE ENCOUNTER
Central Prior Authorization Team   Phone: [phone]    PA Initiation    Medication: rosuvastatin (CRESTOR) 20 MG tablet  Insurance Company: [employer] - Phone [phone] Fax [phone]  Pharmacy Filling the Rx: [facility] #[id number] [place], [place] - [address] AT [facility] [address]  Filling Pharmacy Phone: [phone]  Filling Pharmacy Fax:    Start Date: 1/21/2022

## 2022-01-31 DIAGNOSIS — Z11.59 ENCOUNTER FOR SCREENING FOR OTHER VIRAL DISEASES: Primary | ICD-10-CM

## 2022-02-07 ENCOUNTER — LAB (OUTPATIENT)
Dept: LAB | Facility: CLINIC | Age: 57
End: 2022-02-07
Payer: COMMERCIAL

## 2022-02-07 DIAGNOSIS — Z11.59 ENCOUNTER FOR SCREENING FOR OTHER VIRAL DISEASES: ICD-10-CM

## 2022-02-07 PROCEDURE — U0003 INFECTIOUS AGENT DETECTION BY NUCLEIC ACID (DNA OR RNA); SEVERE ACUTE RESPIRATORY SYNDROME CORONAVIRUS 2 (SARS-COV-2) (CORONAVIRUS DISEASE [COVID-19]), AMPLIFIED PROBE TECHNIQUE, MAKING USE OF HIGH THROUGHPUT TECHNOLOGIES AS DESCRIBED BY CMS-2020-01-R: HCPCS | Mod: 90 | Performed by: PATHOLOGY

## 2022-02-07 PROCEDURE — 99000 SPECIMEN HANDLING OFFICE-LAB: CPT | Performed by: PATHOLOGY

## 2022-02-07 PROCEDURE — U0005 INFEC AGEN DETEC AMPLI PROBE: HCPCS | Mod: 90 | Performed by: PATHOLOGY

## 2022-02-08 LAB — SARS-COV-2 RNA RESP QL NAA+PROBE: NEGATIVE

## 2022-02-10 ENCOUNTER — HOSPITAL ENCOUNTER (OUTPATIENT)
Facility: AMBULATORY SURGERY CENTER | Age: 57
Discharge: HOME | End: 2022-02-10
Attending: ANESTHESIOLOGY | Admitting: ANESTHESIOLOGY
Payer: COMMERCIAL

## 2022-02-10 ENCOUNTER — ANCILLARY PROCEDURE (OUTPATIENT)
Dept: RADIOLOGY | Facility: AMBULATORY SURGERY CENTER | Age: 57
End: 2022-02-10
Attending: ANESTHESIOLOGY
Payer: COMMERCIAL

## 2022-02-10 VITALS
DIASTOLIC BLOOD PRESSURE: 124 MMHG | OXYGEN SATURATION: 92 % | HEART RATE: 82 BPM | TEMPERATURE: 97.8 F | SYSTOLIC BLOOD PRESSURE: 180 MMHG | RESPIRATION RATE: 20 BRPM

## 2022-02-10 PROCEDURE — 64493 INJ PARAVERT F JNT L/S 1 LEV: CPT | Mod: LT

## 2022-02-10 PROCEDURE — 64494 INJ PARAVERT F JNT L/S 2 LEV: CPT | Mod: LT | Performed by: ANESTHESIOLOGY

## 2022-02-10 PROCEDURE — 64493 INJ PARAVERT F JNT L/S 1 LEV: CPT | Mod: RT | Performed by: ANESTHESIOLOGY

## 2022-02-10 RX ORDER — BUPIVACAINE HYDROCHLORIDE 2.5 MG/ML
INJECTION, SOLUTION INFILTRATION; PERINEURAL PRN
Status: DISCONTINUED | OUTPATIENT
Start: 2022-02-10 | End: 2022-02-10 | Stop reason: HOSPADM

## 2022-02-10 RX ORDER — IOPAMIDOL 408 MG/ML
INJECTION, SOLUTION INTRAVASCULAR PRN
Status: DISCONTINUED | OUTPATIENT
Start: 2022-02-10 | End: 2022-02-10 | Stop reason: HOSPADM

## 2022-02-10 RX ORDER — TRIAMCINOLONE ACETONIDE 40 MG/ML
INJECTION, SUSPENSION INTRA-ARTICULAR; INTRAMUSCULAR PRN
Status: DISCONTINUED | OUTPATIENT
Start: 2022-02-10 | End: 2022-02-10 | Stop reason: HOSPADM

## 2022-02-10 NOTE — OP NOTE
Patient: Babar Bhatt Age: 55 year old   MRN: [id number] Attending: Dr. Lopez      Date of Visit: October 7, 2021        PAIN MEDICINE CLINIC PROCEDURE NOTE     ATTENDING CLINICIAN:    William Lopez MD     ASSISTANT CLINICIAN:  Lyle Davalos DO     PREPROCEDURE DIAGNOSES:  1.  Low back pain  2.  Lumbar facet arthropathy        PROCEDURE(S) PERFORMED:  1.  Bilateral lumbar L4-5, and L5-S1 facet joint injections.   2.  Fluoroscopic guidance for the above-named procedure(s)        ANESTHESIA:  Local.     BLOOD LOSS:  Minimal.     DRAINS AND SPECIMENS:  None.     COMPLICATIONS:  None.     INDICATIONS:  Babar Bhatt is a 55 year old male with a history of low back pain.  The patient stated that he was in usual state of health and denied recent anticoagulant use or recent infections.  Therefore, the plan is to perform above mentioned procedure.      Procedure Details:  The patient was met in the procedure room, where the patient was identified by name, medical record number and date of birth.  All of the patient s last minute questions were answered. Written informed consent was obtained and saved in the electronic medical record, after the risks, benefits, and alternatives were discussed with the patient.       A formal time-out procedure was performed by Dr. Lopez, as per protocol, including patient name, title of procedure, and site of procedure, and all in the room concurred.  Routine monitors were applied.       The patient was placed in the prone position on the procedure room table.  All pressure points were checked and comfortably padded.  Routine monitors were placed.  Vital signs were stable.     A chlorhexidine prep was completed followed by sterile draping per standard procedure.      The anatomical target site was determined using fluoroscopy by squaring off the superior endplate. AP fluoroscopic guidance was used to identify the right L4-L5 facet joint. Then ipsilateral oblique tilt was used  in order  to maximally visualize the joint space.  The skin overlying these joints was anesthetized with 1% lidocaine, using a 25 gauge, 1.5 inch needle.  Next, a 5 inch spinal needle was introduced and advanced through the anesthetized tract and advanced to the joint space at the above-named level.  Appropriate depth was confirmed with lateral fluoroscopic guidance.  This same procedure was repeated at the right L5-S1, left L4-5 and left L5-S1 facet joints     After negative aspiration for heme, CSF, or air, 1 mL of a treatment mixture containing 1 mL of triamcinolone and 5 mL of bupivacaine 0.25% was injected at each of the above named levels.  Addition 1 ml of the solution was injected into the periarticular space. The needles were then removed.      Light pressure was held at the puncture site(s) to prevent ecchymosis and oozing.  The patient's skin was cleansed, and hemostasis was confirmed.  Band-aids were applied to the needle injection site(s).       Condition:     The patient remained awake and alert throughout the procedure.  The patient tolerated the procedure well and was monitored for approximately 15 minutes afterward in the post procedure area.  There were no immediate post procedure complications noted.  The patient was then discharged to home as per protocol.  The patient will follow up in the outpatient clinic in 4 week(s), unless otherwise clinically indicated.          Patient condition  stable.     Preprocedure pain score: 8 out of 10  Postprocedure pain score: 2 out of 10

## 2022-02-10 NOTE — DISCHARGE INSTRUCTIONS
PAIN INJECTION HOME CARE INSTRUCTIONS  Activity  -You may resume most normal activity levels with the exception of strenuous activity. It may help to move in ways that hurt before the injection, to see if the pain is still there, but do not overdo it. Take it easy for the rest of the day.    -DO NOT remove bandaid for 24 hours  -DO NOT shower for 24 hours      Pain  -You may feel immediate pain relief and numbness for a period of time after the injection. This may indicate the medication has reached the right spot.  -Your pain may return after this short pain-free period, or may even be a little worse for a day or two. It may be caused by needle irritation or by the medication itself. The medications usually take two or three days to start working, but can take as long as a week.    -You may use an ice pack for 20 minutes every 2 hours for the first 24 hours  -You may use a heating pad after the first 24 hours  -You may use Tylenol (acetaminophen) every 4 hours or other pain medicines as directed by your physician    DID YOU RECEIVE STEROIDS TODAY?    Common side effects of steroids:  Not everyone will experience corticosteroid side effects. If side effects are experienced, they will gradually subside in the 7-10 day period following an injection. Most common side effects include:  -Flushed face and/or chest  -Feeling of warmth, particularly in the face but could be an overall feeling of warmth  -Increased blood sugar in diabetic patients  -Menstrual irregularities my occur. If taking hormone-based birth control an alternate method of birth control is recommended  -Sleep disturbances and/or mood swings are possible  -Leg cramps    PLEASE KEEP TRACK OF YOUR SYMPTOMS AND NOTE ANY CHANGES FOR YOUR DOCTOR.       Please contact us if you have:  -Severe pain  -Fever more than 101.5 degrees Fahrenheit  -Signs of infection at the injection site (redness, swelling, or drainage)      FOR PAIN CENTER PATIENTS:  If you have  questions, please contact the Pain Clinic at [phone] Option #1 between the hours of 7:00 am and 3:00 pm Monday through Friday. After office hours you can contact the on call provider by dialing [phone]. If you need immediate attention, we recommend that you go to a hospital emergency room or dial [phone].      FOR PM&R PATIENTS:  For patients seen by the PM and R service, please call [phone]. If you need to fax a pain diary to PM&R the fax number is [phone]. If you are unable to fax, uploading to [employer] is encouraged, then send to provider. If you have procedure scheduling questions please call [phone] Option #2

## 2022-02-11 ENCOUNTER — HOSPITAL ENCOUNTER (OUTPATIENT)
Facility: AMBULATORY SURGERY CENTER | Age: 57
End: 2022-02-11
Attending: INTERNAL MEDICINE
Payer: COMMERCIAL

## 2022-02-11 ENCOUNTER — TELEPHONE (OUTPATIENT)
Dept: GASTROENTEROLOGY | Facility: CLINIC | Age: 57
End: 2022-02-11
Payer: COMMERCIAL

## 2022-02-11 DIAGNOSIS — Z11.59 ENCOUNTER FOR SCREENING FOR OTHER VIRAL DISEASES: Primary | ICD-10-CM

## 2022-02-11 NOTE — TELEPHONE ENCOUNTER
Screening Questions  Blue=prep questions Red=location Green=sedation   1. Are you active on mychart? N    2. What insurance is in the chart? [employer]     3.  Ordering/Referring Provider: Odin    4. BMI 34.0, If greater than 40 review exclusion criteria also will need EXTENDED PREP    5.  Respiratory Screening (If yes to any of the following HOSPITAL setting only):     Do you use daily home oxygen? N  Do you have mod to severe Obstructive Sleep Apnea? N (can be seen at [facility] or hospital setting)    Do you have Pulmonary Hypertension? N   Do you have UNCONTROLLED asthma? N    6. Have you had a heart or lung transplant? N  (If yes, please review exclusion criteria)    7. Are you currently on dialysis?N  (If yes, schedule in HOSPITAL setting only)(If yes, please send Golytely prep)    8. Do you have chronic kidney disease? N (If yes, please send Golytely prep)    9. Have you had a stroke or Transient ischemic attack (TIA) within 6 months? N (If yes, do not schedule at [facility])    10. In the past 6 months, have you had any heart related issues including cardiomyopathy or heart attack? N (If yes, please review exclusion criteria)           If yes, did it require cardiac stenting or other implantable device?N  (If yes, please review exclusion criteria)      11. Do you have any implantable devices in your body (pacemaker, defib, LVAD)? N (If yes, schedule at [facility])    12. Do you take nitroglycerin? If yes, how often? N (if yes, schedule at HOSPITAL setting)    13. Are you currently taking any blood thinners?N (If yes- inform patient to follow up with PCP or provider for follow up instructions)     14. Are you a diabetic? N (If yes, please send Golytely prep)    15. (Females) Are you currently pregnant? NA  If yes, how many weeks?      16. Are you taking any prescription pain medications on a routine schedule? N If yes, MAC sedation and patient will need EXTENDED PREP.    17. Do you have any chemical dependencies such as  alcohol, street drugs, or methadone? N If yes, MAC sedation     18. Do you have any history of post-traumatic stress syndrome, severe anxiety or history of psychosis? N  If yes, MAC sedation.     19. Do you transfer independently? Yes    20.  Do you have any issues with constipation? N   If yes, pt will need EXTENDED PREP     21. Preferred Pharmacy for Pre Prescription   [id number] - [place] [address] [facility] [address]  Scheduling Details    Which Colonoscopy Prep was Sent?: Miralax  Type of Procedure Scheduled: Colon  Surgeon: Leventhal  Date of Procedure: 3/2/22  Location: [facility]  Caller (Please ask for phone number if not scheduled by patient): Babar      Sedation Type: CS  Conscious Sedation- Needs  for 6 hours after the procedure  MAC/General-Needs  for 24 hours after procedure    Pre-op Required at [facility], [place], [place] and OR for MAC sedation: NA  (if yes advise patient they will need a pre-op prior to procedure)      Informed patient they will need an adult  Yes  Cannot take any type of public or medical transportation alone    Pre-Procedure Covid test to be completed at [facility] or Externally: [facility] 2/26/22    Confirmed Nurse will call to complete assessment Yes    Additional comments:  ([facility] PATIENTS NEED EXTENDED PREP)

## 2022-02-14 ENCOUNTER — TELEPHONE (OUTPATIENT)
Dept: PULMONOLOGY | Facility: CLINIC | Age: 57
End: 2022-02-14
Payer: COMMERCIAL

## 2022-02-14 NOTE — CONFIDENTIAL NOTE
Prior Authorization Retail Medication Request    Medication/Dose: Budesonide-Formoterol Fumarate 160-4.5MCG  ICD code (if different than what is on RX):    Previously Tried and Failed:    Rationale:      Insurance Name:    Insurance ID:        Pharmacy Information (if different than what is on RX)  Name:  [facility]  Phone:  [phone]

## 2022-02-17 PROBLEM — G89.4 CHRONIC PAIN SYNDROME: Status: ACTIVE | Noted: 2019-10-22

## 2022-02-20 NOTE — TELEPHONE ENCOUNTER
Prior Authorization Not Needed per Insurance    Medication: Budesonide-Formoterol Fumarate 160-4.5MCG-PA NOT NEEDED   Insurance Company: [employer] - Phone [phone] Fax [phone]  Expected CoPay:      Pharmacy Filling the Rx: [facility] #[id number] - [place], [place] - [address]  Pharmacy Notified: Yes  Patient Notified: No    Called pharmacy and pharmacy stated that PA is Not Needed and Brand Symbicort is covered. **Instructed pharmacy to notify patient when script is ready to /ship.** Pharmacy stated that they have a paid claim on medication on 2/15/2022 and patent has picked up medication.

## 2022-02-23 ENCOUNTER — TELEPHONE (OUTPATIENT)
Dept: GASTROENTEROLOGY | Facility: CLINIC | Age: 57
End: 2022-02-23

## 2022-02-23 ENCOUNTER — TELEPHONE (OUTPATIENT)
Dept: GASTROENTEROLOGY | Facility: CLINIC | Age: 57
End: 2022-02-23
Payer: COMMERCIAL

## 2022-02-23 NOTE — TELEPHONE ENCOUNTER
Caller: Babar    Procedure: Colon    Date, Location, and Surgeon of Procedure Cancelled: 3-2 [facility]    Ordering Provider:    Reason for cancel (please be detailed, any staff messages or encounters to note?): PT will call to reschedule when they can        Rescheduled: N

## 2022-02-23 NOTE — TELEPHONE ENCOUNTER
Patient scheduled for colonoscopy on 3/2/22.     Covid test scheduled: 2/26/22    Arrival time: 1030    Facility location: [facility]    Sedation type: CS    Indication for procedure: screening    Anticoagulations? no    Bowel prep recommendation: Miralax/Magnesium citrate/Dulcolax     Pre visit planning completed.    Yuli Pryor RN

## 2022-02-23 NOTE — TELEPHONE ENCOUNTER
Called patient in attempts to complete pre assessment for upcoming procedure.     Patient requesting to cancel procedure at this time and states they will call back to reschedule.     Yuli Pryor RN

## 2022-03-09 DIAGNOSIS — J44.9 CHRONIC OBSTRUCTIVE PULMONARY DISEASE, UNSPECIFIED COPD TYPE (H): ICD-10-CM

## 2022-03-09 RX ORDER — ALBUTEROL SULFATE 90 UG/1
2 AEROSOL, METERED RESPIRATORY (INHALATION) EVERY 6 HOURS
Qty: 6.7 G | Refills: 1 | Status: SHIPPED | OUTPATIENT
Start: 2022-03-09 | End: 2022-05-12

## 2022-03-14 ENCOUNTER — TELEPHONE (OUTPATIENT)
Dept: PULMONOLOGY | Facility: CLINIC | Age: 57
End: 2022-03-14

## 2022-03-14 ENCOUNTER — DOCUMENTATION ONLY (OUTPATIENT)
Dept: PULMONOLOGY | Facility: CLINIC | Age: 57
End: 2022-03-14
Payer: COMMERCIAL

## 2022-03-14 ENCOUNTER — OFFICE VISIT (OUTPATIENT)
Dept: ANESTHESIOLOGY | Facility: CLINIC | Age: 57
End: 2022-03-14
Payer: COMMERCIAL

## 2022-03-14 VITALS — DIASTOLIC BLOOD PRESSURE: 83 MMHG | OXYGEN SATURATION: 97 % | HEART RATE: 95 BPM | SYSTOLIC BLOOD PRESSURE: 115 MMHG

## 2022-03-14 DIAGNOSIS — M54.6 CHRONIC BILATERAL THORACIC BACK PAIN: Primary | ICD-10-CM

## 2022-03-14 DIAGNOSIS — M47.816 LUMBAR FACET ARTHROPATHY: ICD-10-CM

## 2022-03-14 DIAGNOSIS — G89.29 CHRONIC BILATERAL THORACIC BACK PAIN: Primary | ICD-10-CM

## 2022-03-14 DIAGNOSIS — M79.18 MYOFASCIAL PAIN: ICD-10-CM

## 2022-03-14 PROCEDURE — 99214 OFFICE O/P EST MOD 30 MIN: CPT | Performed by: NURSE PRACTITIONER

## 2022-03-14 ASSESSMENT — PAIN SCALES - GENERAL: PAINLEVEL: SEVERE PAIN (7)

## 2022-03-14 NOTE — NURSING NOTE
Patient presents with:  RECHECK: [facility] RETURN, RM 10, patient reports pain in back      Severe Pain (7)     What medications are you using for pain? Gabapentin, took tramadol up until a few months ago(problems with prescription)       (Return Patients only) What refills are you needing today? Yes, tramadol      Sarika Holguin, EMT

## 2022-03-14 NOTE — PROGRESS NOTES
Received form for a Prior Auth for Budesonide- Formoterol 160-4.5MCG/ACT Aerosol. Patient does not see Dr. Ann. [place]ell [place] [facility]     Faxed over form to [facility] at [phone].    Amanda Nicole MA

## 2022-03-14 NOTE — TELEPHONE ENCOUNTER
Prior Authorization Specialty Medication Request    Medication/Dose: budesonide-formoterol fumarate 160-4.5mcg/act aerosol  ICD code (if different than what is on RX):    Previously Tried and Failed:      Important Lab Values:   Rationale:     Insurance Name:   Insurance ID:   Insurance Phone Number:     Pharmacy Information (if different than what is on RX)  Name:  [facility]  Phone:  [phone]

## 2022-03-14 NOTE — PROGRESS NOTES
[facility]     Date of visit: 3/14/2022      Assessment:   The patient is a 56 year old male with PMHx of HTN, depression, and COPD who was referred by Dr. Mayers for evaluation of chronic low back pain.  Based upon his reported symptoms, exam findings, and the absence of specific findings on available imaging, he likely has a combination of facet arthropathy mediated pain and overlying myofascial pain.      Visit Diagnoses:  1. Chronic bilateral thoracic back pain    2. Myofascial pain    3. Lumbar facet arthropathy        Plan:    Work up:    Pending the progress we make with this plan, we may consider a thoracic MRI for further evaluation.     Medications:    Babar comments that tramadol has been the most effective medication for his pain.  We discussed that I would recommend targeting the origin of his pain rather than masking it with a opioid.  He clearly has some muscle spasm and as he found Flexeril only somewhat helpful we will try a different muscle relaxant.  Start tizanidine 4mg three times daily as needed. Monitor pain benefit.      Therapies:    I strongly recommended Babar consider a trial of chiropractic care.  He has not tried this resource before and it has good potential to help him.  I would recommend visits on a weekly basis.      Interventions:    Babar reports significant pain relief of localized low back pain after lumbar facet joint injections.  I advised to continue to monitor benefit.  He reports bilateral thoracic back pain is his most bothersome symptom today.  This is likely myofascial, though no significant tenderness noted on exam.  We discussed a trial of trigger point injections, chiropractic, and a muscle relaxant first.  He is interested.  We will call to schedule trigger point injections.      Follow up: in 6 weeks.       Ritika BOWERS CNP  [facility]     -------------------------------------------------    Subjective:    Chief  "complaint:   Chief Complaint   Patient presents with     RECHECK     [facility] RETURN, RM 10, patient reports pain in back     Interval history:  Babar Bhatt is a 56 year old male last seen on 9/23/2021.  he is a patient of Dr. Bravo seen in follow up.     Recommendations/plan at the last visit included:  Work up:    MRI reviewed, nothing further at this time     Referrals:    None at this time     Medications:    Trial cyclobenzaprine 5-20 mg TID PRN for myofascial pain.  Will assess for benefit at follow up.      Therapies:    Deferred at this time, however referrals for chiropractic or acupuncture could be considered     Interventions:    Order placed for bilateral L4/5 and L5/S1 facet joint intraarticular injections     Follow up: 4-6 weeks following above listed injections    Since his last visit, Babar Bhatt reports:  -His pain is better as it was at last visit.   -He had bilateral L4-5 and L5-S1 facet joint injections with Dr. Lopez on 2/10/2022. He reports 50-75% pain relief in axial low back pain.  -He continues to struggle with bilateral mid back pain. States this pain is most aggravated at the end of the day of work. States, \"I can feel everything just grabbing, clenching.\"   -He started Flexeril 5-20mg and tried TID prn with some benefit. States, \"I don't think it was strong enough.\" He states the most effective medication he has used was Tramadol.    -He has never tried chiropractic care but would be interested in this.       HPI per Dr. Bravo  The patient is a 55 year old male with past medical history of HTN, depression, and COPD who presents for evaluation of low back pain.  The patient's pain began several years ago without any particular precipitating event and has been slowly progressive since that time.  He reports that his pain is located primarily in the bilateral low back and radiates to the anterior thighs and occasionally the posterior thighs.  The patient describes the pain as sharp " and stiff in the back and numb and burning in the thighs.  He reports that the pain is made worse by prolonged standing (>15 minutes standing, >30 minutes walking).  His pain is improved with sitting or flexing at the waist (improved but not eliminated).  He denies any associated neurological symptoms of the lower extremities.  The patient's pain does not have a significant temporal component, but is activity dependent.  His pain is variable with his activity level but can be as low as 5/10 or as severe as 10/10.       Pain Information:   Pain rating: averages 7/10 on a 0-10 scale.      Current pain medications:   Flexeril 5-20mg TID prn- [facility], ran out last year  Gabapentin 300 mg TID (helpful for leg pain)    Current MME: 0    Review of [place] Prescription Monitoring Program (): No concern for abuse or misuse of controlled medications based on this report.     Annual Controlled Substance Agreement/UDS due date: NA    Previous medication treatments included:  Anti-convulsants: Gabapentin  Muscle relaxors: not tried  Anti-depressants: not tried  Acetaminophen/NSAIDs: non beneficial  Topicals: non beneficial     Other treatments have included:  Physical therapy: non beneficial  Pain Psychology: not tried  Chiropractic: not tried  Acupuncture: not tried  TENs Unit: Possibly for legs  Injections: bilateral L4-5 and L5-S1 facet joint injections with Dr. Lopez on 2/10/2022- reports 50-75% pain benefit  Surgeries: none in the area    Medications:  Current Outpatient Medications   Medication Sig Dispense Refill     albuterol (ACCUNEB) 1.25 MG/3ML neb solution Take 1 vial (1.25 mg) by nebulization every 6 hours as needed for shortness of breath / dyspnea or wheezing 3 mL 0     albuterol (PROAIR HFA/PROVENTIL HFA/VENTOLIN HFA) 108 (90 Base) MCG/ACT inhaler Inhale 2 puffs into the lungs every 6 hours 6.7 g 1     budesonide-formoterol (SYMBICORT) 160-4.5 MCG/ACT Inhaler Inhale 2 puffs into the lungs 2 times daily 10.2 g 11      buPROPion (WELLBUTRIN XL) 300 MG 24 hr tablet        citalopram (CELEXA) 40 MG tablet TK 1 T PO QD       fluticasone (FLOVENT HFA) 110 MCG/ACT inhaler Inhale 1 puff into the lungs 2 times daily 12 g 1     gabapentin (NEURONTIN) 300 MG capsule Take 1 capsule (300 mg) by mouth 4 times daily 120 capsule 4     rosuvastatin (CRESTOR) 20 MG tablet Take 1 tablet (20 mg) by mouth daily 90 tablet 1     tiZANidine (ZANAFLEX) 4 MG tablet Take 1 tablet (4 mg) by mouth 3 times daily as needed for muscle spasms 75 tablet 1     traZODone (DESYREL) 50 MG tablet Take 300 mg by mouth At Bedtime        triamterene-HCTZ (MAXZIDE) 75-50 MG tablet Take 1 tablet by mouth daily 90 tablet 0     zolpidem (AMBIEN) 5 MG tablet Take 5 mg by mouth nightly as needed for sleep         Medical History: any changes in medical history since they were last seen? No    Review of Systems: A 10-point review of systems was negative, with the exception of chronic pain issues.      Objective:    Physical Exam:  Blood pressure 115/83, pulse 95, SpO2 97 %.  Constitutional: Well developed, well nourished, appears stated age. Obese.   Gait: Normal  HEENT: Head atraumatic, normocephalic. Eyes without conjunctival injection or jaundice. Oropharynx clear. Neck supple. No obvious neck masses.  Skin: No rash, lesions, or petechiae of exposed skin.   Psychiatric/mental status: Alert, without lethargy or stupor. Speech fluent. Appropriate affect. Mood normal. Able to follow commands without difficulty.     MSK exam: Tenderness to inferior thoracic paraspinals with deep palpation.    Imaging:  MR LUMBAR SPINE W/O CONTRAST 7/21/2021 3:42 PM     Provided History: Low back pain, > 6 wks; Spondylolisthesis of  lumbosacral region  ICD-10: Spondylolisthesis of lumbosacral region     Comparison: Radiograph dated 4/28/2021     Technique: Sagittal T1-weighted, sagittal T2-weighted, sagittal STIR,  sagittal diffusion-weighted, axial T2-weighted, and axial gradient  echo  images of the lumbar spine were obtained without the  administration of intravenous contrast.     Findings: Counting down from C2, there are 5 lumbar-type vertebrae.   The tip of the conus medullaris is at L1. Normal alignment of the  lumbar vertebrae. No significant disc height narrowing at any level.  Normal bone marrow signal on STIR images. Epidural lipomatosis caudal  to L5-S1.  On a level by level basis:     T12-L1: No spinal canal or neuroforaminal stenosis.     L1-2: Facet arthropathy bilaterally. No spinal canal or neuroforaminal  stenosis.     L2-3: No spinal canal or neuroforaminal stenosis.     L3-4: No spinal canal or neuroforaminal stenosis.     L4-5: Bilateral facet arthropathy. Ligamentum flavum thickening. Mild  broad based disc bulge. No spinal canal or neuroforaminal stenosis.     L5-S1: Bilateral facet arthropathy. Focal right central/subarticular  disc protrusion contacting the traversing right S1 nerve root. Mild  left neural foraminal narrowing. Right neural foramen is pain. No  significant spinal canal stenosis.     No abnormality of the paraspinous soft tissues.                                                                      Impression:   1. Focal right central/subarticular disc protrusion at L5-S1  contacting the traversing right S1 nerve root.  2. No high-grade neural foraminal or spinal canal stenosis at any  level.    BILLING TIME DOCUMENTATION:   The total TIME spent on this patient on the date of the encounter/appointment was 20 minutes.      TOTAL TIME includes:   Time spent preparing to see the patient (reviewing records and tests)   Time spent face to face (or over the phone) with the patient   Time spent ordering tests, medications, procedures and referrals   Time spent Referring and communicating with other healthcare professionals   Time spent documenting clinical information in Epic

## 2022-03-14 NOTE — LETTER
3/14/2022       RE: Babar Bhatt  [address] [facility] [address]     Dear Colleague,    Thank you for referring your patient, Babar Bhatt, to the [facility] [place] at [facility]. Please see a copy of my visit note below.    [facility]     Date of visit: 3/14/2022      Assessment:   The patient is a 56 year old male with PMHx of HTN, depression, and COPD who was referred by Dr. Mayers for evaluation of chronic low back pain.  Based upon his reported symptoms, exam findings, and the absence of specific findings on available imaging, he likely has a combination of facet arthropathy mediated pain and overlying myofascial pain.      Visit Diagnoses:  1. Chronic bilateral thoracic back pain    2. Myofascial pain    3. Lumbar facet arthropathy        Plan:    Work up:    Pending the progress we make with this plan, we may consider a thoracic MRI for further evaluation.     Medications:    Babar comments that tramadol has been the most effective medication for his pain.  We discussed that I would recommend targeting the origin of his pain rather than masking it with a opioid.  He clearly has some muscle spasm and as he found Flexeril only somewhat helpful we will try a different muscle relaxant.  Start tizanidine 4mg three times daily as needed. Monitor pain benefit.      Therapies:    I strongly recommended Babar consider a trial of chiropractic care.  He has not tried this resource before and it has good potential to help him.  I would recommend visits on a weekly basis.      Interventions:    Babar reports significant pain relief of localized low back pain after lumbar facet joint injections.  I advised to continue to monitor benefit.  He reports bilateral thoracic back pain is his most bothersome symptom today.  This is likely myofascial, though no significant tenderness noted on  "exam.  We discussed a trial of trigger point injections, chiropractic, and a muscle relaxant first.  He is interested.  We will call to schedule trigger point injections.      Follow up: in 6 weeks.       Ritika BOWERS [facility]     -------------------------------------------------    Subjective:    Chief complaint:   Chief Complaint   Patient presents with     RECHECK     [facility] RETURN, RM 10, patient reports pain in back     Interval history:  Babar Bhatt is a 56 year old male last seen on 9/23/2021.  he is a patient of Dr. Bravo seen in follow up.     Recommendations/plan at the last visit included:  Work up:    MRI reviewed, nothing further at this time     Referrals:    None at this time     Medications:    Trial cyclobenzaprine 5-20 mg TID PRN for myofascial pain.  Will assess for benefit at follow up.      Therapies:    Deferred at this time, however referrals for chiropractic or acupuncture could be considered     Interventions:    Order placed for bilateral L4/5 and L5/S1 facet joint intraarticular injections     Follow up: 4-6 weeks following above listed injections    Since his last visit, Babar Bhatt reports:  -His pain is better as it was at last visit.   -He had bilateral L4-5 and L5-S1 facet joint injections with Dr. Lopez on 2/10/2022. He reports 50-75% pain relief in axial low back pain.  -He continues to struggle with bilateral mid back pain. States this pain is most aggravated at the end of the day of work. States, \"I can feel everything just grabbing, clenching.\"   -He started Flexeril 5-20mg and tried TID prn with some benefit. States, \"I don't think it was strong enough.\" He states the most effective medication he has used was Tramadol.    -He has never tried chiropractic care but would be interested in this.       HPI per Dr. Bravo  The patient is a 55 year old male with past medical history of HTN, depression, and COPD who presents for evaluation of " low back pain.  The patient's pain began several years ago without any particular precipitating event and has been slowly progressive since that time.  He reports that his pain is located primarily in the bilateral low back and radiates to the anterior thighs and occasionally the posterior thighs.  The patient describes the pain as sharp and stiff in the back and numb and burning in the thighs.  He reports that the pain is made worse by prolonged standing (>15 minutes standing, >30 minutes walking).  His pain is improved with sitting or flexing at the waist (improved but not eliminated).  He denies any associated neurological symptoms of the lower extremities.  The patient's pain does not have a significant temporal component, but is activity dependent.  His pain is variable with his activity level but can be as low as 5/10 or as severe as 10/10.       Pain Information:   Pain rating: averages 7/10 on a 0-10 scale.      Current pain medications:   Flexeril 5-20mg TID prn- [facility], ran out last year  Gabapentin 300 mg TID (helpful for leg pain)    Current MME: 0    Review of [place] Prescription Monitoring Program (): No concern for abuse or misuse of controlled medications based on this report.     Annual Controlled Substance Agreement/UDS due date: NA    Previous medication treatments included:  Anti-convulsants: Gabapentin  Muscle relaxors: not tried  Anti-depressants: not tried  Acetaminophen/NSAIDs: non beneficial  Topicals: non beneficial     Other treatments have included:  Physical therapy: non beneficial  Pain Psychology: not tried  Chiropractic: not tried  Acupuncture: not tried  TENs Unit: Possibly for legs  Injections: bilateral L4-5 and L5-S1 facet joint injections with Dr. Lopez on 2/10/2022- reports 50-75% pain benefit  Surgeries: none in the area    Medications:  Current Outpatient Medications   Medication Sig Dispense Refill     albuterol (ACCUNEB) 1.25 MG/3ML neb solution Take 1 vial (1.25 mg) by  nebulization every 6 hours as needed for shortness of breath / dyspnea or wheezing 3 mL 0     albuterol (PROAIR HFA/PROVENTIL HFA/VENTOLIN HFA) 108 (90 Base) MCG/ACT inhaler Inhale 2 puffs into the lungs every 6 hours 6.7 g 1     budesonide-formoterol (SYMBICORT) 160-4.5 MCG/ACT Inhaler Inhale 2 puffs into the lungs 2 times daily 10.2 g 11     buPROPion (WELLBUTRIN XL) 300 MG 24 hr tablet        citalopram (CELEXA) 40 MG tablet TK 1 T PO QD       fluticasone (FLOVENT HFA) 110 MCG/ACT inhaler Inhale 1 puff into the lungs 2 times daily 12 g 1     gabapentin (NEURONTIN) 300 MG capsule Take 1 capsule (300 mg) by mouth 4 times daily 120 capsule 4     rosuvastatin (CRESTOR) 20 MG tablet Take 1 tablet (20 mg) by mouth daily 90 tablet 1     tiZANidine (ZANAFLEX) 4 MG tablet Take 1 tablet (4 mg) by mouth 3 times daily as needed for muscle spasms 75 tablet 1     traZODone (DESYREL) 50 MG tablet Take 300 mg by mouth At Bedtime        triamterene-HCTZ (MAXZIDE) 75-50 MG tablet Take 1 tablet by mouth daily 90 tablet 0     zolpidem (AMBIEN) 5 MG tablet Take 5 mg by mouth nightly as needed for sleep         Medical History: any changes in medical history since they were last seen? No    Review of Systems: A 10-point review of systems was negative, with the exception of chronic pain issues.      Objective:    Physical Exam:  Blood pressure 115/83, pulse 95, SpO2 97 %.  Constitutional: Well developed, well nourished, appears stated age. Obese.   Gait: Normal  HEENT: Head atraumatic, normocephalic. Eyes without conjunctival injection or jaundice. Oropharynx clear. Neck supple. No obvious neck masses.  Skin: No rash, lesions, or petechiae of exposed skin.   Psychiatric/mental status: Alert, without lethargy or stupor. Speech fluent. Appropriate affect. Mood normal. Able to follow commands without difficulty.     MSK exam: Tenderness to inferior thoracic paraspinals with deep palpation.    Imaging:  MR LUMBAR SPINE W/O CONTRAST  7/21/2021 3:42 PM     Provided History: Low back pain, > 6 wks; Spondylolisthesis of  lumbosacral region  ICD-10: Spondylolisthesis of lumbosacral region     Comparison: Radiograph dated 4/28/2021     Technique: Sagittal T1-weighted, sagittal T2-weighted, sagittal STIR,  sagittal diffusion-weighted, axial T2-weighted, and axial gradient  echo images of the lumbar spine were obtained without the  administration of intravenous contrast.     Findings: Counting down from C2, there are 5 lumbar-type vertebrae.   The tip of the conus medullaris is at L1. Normal alignment of the  lumbar vertebrae. No significant disc height narrowing at any level.  Normal bone marrow signal on STIR images. Epidural lipomatosis caudal  to L5-S1.  On a level by level basis:     T12-L1: No spinal canal or neuroforaminal stenosis.     L1-2: Facet arthropathy bilaterally. No spinal canal or neuroforaminal  stenosis.     L2-3: No spinal canal or neuroforaminal stenosis.     L3-4: No spinal canal or neuroforaminal stenosis.     L4-5: Bilateral facet arthropathy. Ligamentum flavum thickening. Mild  broad based disc bulge. No spinal canal or neuroforaminal stenosis.     L5-S1: Bilateral facet arthropathy. Focal right central/subarticular  disc protrusion contacting the traversing right S1 nerve root. Mild  left neural foraminal narrowing. Right neural foramen is pain. No  significant spinal canal stenosis.     No abnormality of the paraspinous soft tissues.                                                                      Impression:   1. Focal right central/subarticular disc protrusion at L5-S1  contacting the traversing right S1 nerve root.  2. No high-grade neural foraminal or spinal canal stenosis at any  level.    BILLING TIME DOCUMENTATION:   The total TIME spent on this patient on the date of the encounter/appointment was 20 minutes.      TOTAL TIME includes:   Time spent preparing to see the patient (reviewing records and tests)   Time  spent face to face (or over the phone) with the patient   Time spent ordering tests, medications, procedures and referrals   Time spent Referring and communicating with other healthcare professionals   Time spent documenting clinical information in Epic           ELISSA Leonard CNP

## 2022-03-14 NOTE — PATIENT INSTRUCTIONS
Work up:    May consider a thoracic MRI in the future if not making progress.      Medications:    Start tizanidine 4mg three times daily as needed. Monitor pain benefit.      Therapies:    Consider a trial of chiropractic care, I would recommend visits on a weekly basis.      Interventions:    We will call to schedule trigger point injections.      Follow up: in 6 weeks.

## 2022-03-16 ENCOUNTER — TELEPHONE (OUTPATIENT)
Dept: ANESTHESIOLOGY | Facility: CLINIC | Age: 57
End: 2022-03-16
Payer: COMMERCIAL

## 2022-03-18 NOTE — TELEPHONE ENCOUNTER
Prior Authorization Not Needed per Insurance-Ventolin inhaler is covered and pharmacy has a paid claim for that on 3/9/22.  Medication: budesonide-formoterol fumarate 160-4.5mcg/act aerosol-PA Not Needed  Insurance Company: [employer] - Phone [phone] Fax [phone]  Expected CoPay:      Pharmacy Filling the Rx: [id number] - [place] [address] [address] AT [facility] [address]  Pharmacy Notified: No-this should not have been sent to us  Patient Notified: No

## 2022-03-18 NOTE — TELEPHONE ENCOUNTER
Central Prior Authorization Team   Phone: [phone]      PA Initiation-Initiated via phone with Alina at [facility]    Medication: budesonide-formoterol fumarate 160-4.5mcg/act aerosol-PA initiated  Insurance Company: [email or web address] - Phone [phone] Fax [phone]  Pharmacy Filling the Rx: [facility] #[id number] - [place], [place] - [address] AT [facility] [address]  Filling Pharmacy Phone: [phone]  Filling Pharmacy Fax:    Start Date: 3/18/2022       Cartilage Graft Text: The defect edges were debeveled with a #15 scalpel blade.  Given the location of the defect, shape of the defect, the fact the defect involved a full thickness cartilage defect a cartilage graft was deemed most appropriate.  An appropriate donor site was identified, cleansed, and anesthetized. The cartilage graft was then harvested and transferred to the recipient site, oriented appropriately and then sutured into place.  The secondary defect was then repaired using a primary closure.

## 2022-04-11 DIAGNOSIS — J44.9 CHRONIC OBSTRUCTIVE PULMONARY DISEASE, UNSPECIFIED COPD TYPE (H): ICD-10-CM

## 2022-04-11 RX ORDER — FLUTICASONE PROPIONATE 110 UG/1
1 AEROSOL, METERED RESPIRATORY (INHALATION) 2 TIMES DAILY
Qty: 12 G | Refills: 0 | Status: SHIPPED | OUTPATIENT
Start: 2022-04-11 | End: 2022-09-21

## 2022-04-11 NOTE — TELEPHONE ENCOUNTER
Prescription approved per [facility] Refill Protocol.  1 month refill only; patient due for appointment (followup)  Janay GUZMAN RN

## 2022-05-03 ENCOUNTER — TELEPHONE (OUTPATIENT)
Dept: PULMONOLOGY | Facility: CLINIC | Age: 57
End: 2022-05-03
Payer: COMMERCIAL

## 2022-05-03 NOTE — TELEPHONE ENCOUNTER
Talked with patient and scheduled him for a follow up appointment in our clinic with Dr. Cervantes on 6/24/22. Details of appointment confirmed with patient. Patient was agreeable to waiting to be seen at our clinic until 6/24/22. Patient cancelled his appointment on 5/4/22 with Dr. Newell and requested to reschedule. Patient stated that Friday's or Monday's work best for him to schedule.

## 2022-05-09 ENCOUNTER — TELEPHONE (OUTPATIENT)
Dept: FAMILY MEDICINE | Facility: CLINIC | Age: 57
End: 2022-05-09
Payer: COMMERCIAL

## 2022-05-09 NOTE — TELEPHONE ENCOUNTER
Patient calling about URI  Has been ill for 3 days  Requesting abx by phone  Cough is productive  Afebrile   Has not taken covid test - advised he do this  Grandchild sick with cold too   Push fluids and take OTC meds as needed   If not improving after ~7 days then appt   Told pt typically no abx unless 10-14 days of symptoms  Pt agrees with plan  Janay GUZMAN RN

## 2022-05-12 ENCOUNTER — TELEPHONE (OUTPATIENT)
Dept: FAMILY MEDICINE | Facility: CLINIC | Age: 57
End: 2022-05-12

## 2022-05-12 ENCOUNTER — OFFICE VISIT (OUTPATIENT)
Dept: ANESTHESIOLOGY | Facility: CLINIC | Age: 57
End: 2022-05-12
Payer: COMMERCIAL

## 2022-05-12 VITALS — DIASTOLIC BLOOD PRESSURE: 92 MMHG | OXYGEN SATURATION: 84 % | SYSTOLIC BLOOD PRESSURE: 152 MMHG | HEART RATE: 95 BPM

## 2022-05-12 DIAGNOSIS — J44.9 CHRONIC OBSTRUCTIVE PULMONARY DISEASE, UNSPECIFIED COPD TYPE (H): ICD-10-CM

## 2022-05-12 DIAGNOSIS — G89.29 CHRONIC BILATERAL THORACIC BACK PAIN: ICD-10-CM

## 2022-05-12 DIAGNOSIS — M54.6 CHRONIC BILATERAL THORACIC BACK PAIN: ICD-10-CM

## 2022-05-12 PROCEDURE — 99214 OFFICE O/P EST MOD 30 MIN: CPT | Performed by: ANESTHESIOLOGY

## 2022-05-12 RX ORDER — DIAZEPAM 5 MG
5 TABLET ORAL ONCE
Status: CANCELLED | OUTPATIENT
Start: 2022-05-12 | End: 2022-05-12

## 2022-05-12 RX ORDER — BUDESONIDE AND FORMOTEROL FUMARATE DIHYDRATE 160; 4.5 UG/1; UG/1
2 AEROSOL RESPIRATORY (INHALATION) 2 TIMES DAILY
Qty: 6 G | Refills: 0 | Status: SHIPPED | OUTPATIENT
Start: 2022-05-12 | End: 2022-09-28

## 2022-05-12 RX ORDER — ALBUTEROL SULFATE 90 UG/1
2 AEROSOL, METERED RESPIRATORY (INHALATION) EVERY 6 HOURS
Qty: 6.7 G | Refills: 0 | Status: SHIPPED | OUTPATIENT
Start: 2022-05-12 | End: 2022-06-09

## 2022-05-12 ASSESSMENT — ANXIETY QUESTIONNAIRES
1. FEELING NERVOUS, ANXIOUS, OR ON EDGE: NOT AT ALL
GAD7 TOTAL SCORE: 0
8. IF YOU CHECKED OFF ANY PROBLEMS, HOW DIFFICULT HAVE THESE MADE IT FOR YOU TO DO YOUR WORK, TAKE CARE OF THINGS AT HOME, OR GET ALONG WITH OTHER PEOPLE?: NOT DIFFICULT AT ALL
GAD7 TOTAL SCORE: 0
5. BEING SO RESTLESS THAT IT IS HARD TO SIT STILL: NOT AT ALL
4. TROUBLE RELAXING: NOT AT ALL
7. FEELING AFRAID AS IF SOMETHING AWFUL MIGHT HAPPEN: NOT AT ALL
3. WORRYING TOO MUCH ABOUT DIFFERENT THINGS: NOT AT ALL
2. NOT BEING ABLE TO STOP OR CONTROL WORRYING: NOT AT ALL
6. BECOMING EASILY ANNOYED OR IRRITABLE: NOT AT ALL
7. FEELING AFRAID AS IF SOMETHING AWFUL MIGHT HAPPEN: NOT AT ALL
GAD7 TOTAL SCORE: 0

## 2022-05-12 ASSESSMENT — PAIN SCALES - GENERAL: PAINLEVEL: EXTREME PAIN (8)

## 2022-05-12 NOTE — TELEPHONE ENCOUNTER
Patient due for follow up.  One month supply of Flovent inhaler sent 4/11/22    Albuterol and Budesonide:  Medication is being filled for 1 time refill only due to:  Patient needs to be seen because due for follow up.     Elma Chou RN

## 2022-05-12 NOTE — NURSING NOTE
Patient presents with:  Follow Up: Follow-up RM 8 Lower Back Pain, Pain Level 8/10      Extreme Pain (8)         What medications are you using for pain? Bupropion    (New patients only) Have you been seen by another pain clinic/ provider? No    (Return Patients only) What refills are you needing today? Yes

## 2022-05-12 NOTE — TELEPHONE ENCOUNTER
Reason for Call:  Medication or medication refill:    Do you use a [facility]?  Name of the pharmacy and phone [place] for the [place]    Name of the medication requested: albuterol (PROAIR HFA/PROVENTIL HFA/VENTOLIN HFA) 108 (90 Base) MCG/ACT inhaler  budesonide-formoterol (SYMBICORT) 160-4.5 MCG/ACT Inhaler    Other request: NA    Can we leave a detailed message on this number? YES    Phone number patient can be reached at: Cell number on file:    Telephone Information:   Mobile [phone]       Best Time: any    Call taken on 5/12/2022 at 11:39 AM by Renetta Flores

## 2022-05-12 NOTE — LETTER
5/12/2022       RE: Babar Bhatt  [address] [facility] [address]     Dear Colleague,    Thank you for referring your patient, Babar Bhatt, to the [facility] FOR COMPREHENSIVE PAIN MANAGEMENT [place] [facility]. Please see a copy of my visit note below.    [facility] for Comprehensive Chronic Pain Management : Progress Note    Date of visit: 5/12/2022    Interval history:  Babar Bhatt is a 56 year old male  is known to me for chronic back pain.  His past medical history significant for hypertension, depression, COPD.  His pain located mostly in the lower back, pain is dull and aching in nature.  It does not radiate.His lumbar MRI performed on 7/2022 showed bilateral facet arthropathy at L4-L5 and L5-S1 level.   He responded very well with bilateral facet joint injections.   He has been having these injections every 4 to 5 months.  Last injection was on February 2022.  He wanted to repeat the injections.  He also reports some myofascial pain in the mid thoracic back region.  Although he is scheduled for trigger point injection today, he states that his pain got significantly better after starting on tizanidine.  Therefore would like to hold on the injection and ask for tizanidine refill.  He has no other questions.    [place] Prescription Monitoring Program:   Reviewed. No concerns     Review of Systems:  The 14 system ROS was reviewed and was negative except what is documented above and as follows.  Any bowel or bladder problems: none  Mood: okay    Physical Exam:  Vitals:    05/12/22 1019   BP: (!) 152/92   BP Location: Right arm   Patient Position: Chair   Cuff Size: Adult Large   Pulse: 95   SpO2: (!) 84%       General: Awake in no apparent distress.   Eyes: Sclerae are anicteric. PERRLA, EOMI   Neck: supple, no masses.   Lungs: unlabored.   Heart: regular rate and rhythm   Abdomen: soft non  tender.  Extremities: Pulses are well palpable, no peripheral edema.   Musculoskeletal: 5/5 muscle strength in all extremities.  Tenderness to palpation noted in the lower lumbar area.  Facet loading generated pain.  Neurologic exam:Sensation intact throughout all dermatomes bilateral upper extremities and lower extremities  Psychiatric; Normal affect.   Skin: Warm and Dry.    Medications:  Current Outpatient Medications   Medication Sig Dispense Refill     albuterol (ACCUNEB) 1.25 MG/3ML neb solution Take 1 vial (1.25 mg) by nebulization every 6 hours as needed for shortness of breath / dyspnea or wheezing 3 mL 0     albuterol (PROAIR HFA/PROVENTIL HFA/VENTOLIN HFA) 108 (90 Base) MCG/ACT inhaler Inhale 2 puffs into the lungs every 6 hours 6.7 g 1     budesonide-formoterol (SYMBICORT) 160-4.5 MCG/ACT Inhaler Inhale 2 puffs into the lungs 2 times daily 10.2 g 11     buPROPion (WELLBUTRIN XL) 300 MG 24 hr tablet        citalopram (CELEXA) 40 MG tablet TK 1 T PO QD       fluticasone (FLOVENT HFA) 110 MCG/ACT inhaler Inhale 1 puff into the lungs in the morning and 1 puff in the evening. 12 g 0     gabapentin (NEURONTIN) 300 MG capsule Take 1 capsule (300 mg) by mouth 4 times daily 120 capsule 4     rosuvastatin (CRESTOR) 20 MG tablet Take 1 tablet (20 mg) by mouth daily 90 tablet 1     tiZANidine (ZANAFLEX) 4 MG tablet Take 1 tablet (4 mg) by mouth 3 times daily as needed for muscle spasms 90 tablet 3     traZODone (DESYREL) 50 MG tablet Take 300 mg by mouth At Bedtime        triamterene-HCTZ (MAXZIDE) 75-50 MG tablet Take 1 tablet by mouth daily 90 tablet 0     zolpidem (AMBIEN) 5 MG tablet Take 5 mg by mouth nightly as needed for sleep         Analgesic Medications:   Medications related to Pain Management (From now, onward)    Start     Dose/Rate Route Frequency Ordered Stop    05/12/22 0000  tiZANidine (ZANAFLEX) 4 MG tablet         4 mg Oral 3 TIMES DAILY PRN 05/12/22 1032               LABORATORY VALUES:   Recent  Labs   Lab Test 04/07/21  1450 04/13/19  1627    140   POTASSIUM 4.0 4.0   CHLORIDE 106 108   CO2 29 28   ANIONGAP 6 4   * 97   BUN 15 12   CR 0.90 0.86   LARS 9.0 8.8       CBC RESULTS:   Recent Labs   Lab Test 04/13/19  1627   WBC 8.9   RBC 4.34*   HGB 12.7*   HCT 38.8*   MCV 89   MCH 29.3   MCHC 32.7   RDW 12.4          Most Recent 3 INR's:No lab results found.        ASSESSMENT:    Bilateral lumbar facet arthropathy at the L4-L5 and L5-S1 (status post bilateral facet joint injection)  Myofascial muscle pain  Midthoracic back pain       PLAN:    1. Medications.     Tizanidine 4 mg 3 times daily.  90 tablets dispensed with 3 refills.    2. Interventional procedures:    Ordered bilateral L4-L5 and L5-S1 facet injection.  Risk of the procedure include bleeding, infection, failed procedure, discussed with him.  All of his questions were answered.    3. Labs and imaging: None needed for pain management.      4. Rehab:  Advised to perform home exercise using [employer] videos  [email or web address].  The patient is also encouraged to stay active as tolerated.     5. Psychology: No current needs.     6. Integrated medicine: We discussed acupuncture therapy in the future    7. Disposition:   we will see the patient for the above-mentioned procedure.  Follow-up 2 weeks after the procedure.    Assessment will be ongoing with changes in treatment as indicated.  Benefits/risks/alternatives to treatment have been reviewed and the patient has been instructed to contact this office if they have any questions or concerns.  This plan of care has been discussed with the patient and the patient is in agreement.       William Lopez MD, PHD

## 2022-05-12 NOTE — PROGRESS NOTES
[facility] for Comprehensive Chronic Pain Management : Progress Note    Date of visit: 5/12/2022    Interval history:  Babar Bhatt is a 56 year old male  is known to me for chronic back pain.  His past medical history significant for hypertension, depression, COPD.  His pain located mostly in the lower back, pain is dull and aching in nature.  It does not radiate.His lumbar MRI performed on 7/2022 showed bilateral facet arthropathy at L4-L5 and L5-S1 level.   He responded very well with bilateral facet joint injections.   He has been having these injections every 4 to 5 months.  Last injection was on February 2022.  He wanted to repeat the injections.  He also reports some myofascial pain in the mid thoracic back region.  Although he is scheduled for trigger point injection today, he states that his pain got significantly better after starting on tizanidine.  Therefore would like to hold on the injection and ask for tizanidine refill.  He has no other questions.    [place] Prescription Monitoring Program:   Reviewed. No concerns     Review of Systems:  The 14 system ROS was reviewed and was negative except what is documented above and as follows.  Any bowel or bladder problems: none  Mood: okay    Physical Exam:  Vitals:    05/12/22 1019   BP: (!) 152/92   BP Location: Right arm   Patient Position: Chair   Cuff Size: Adult Large   Pulse: 95   SpO2: (!) 84%       General: Awake in no apparent distress.   Eyes: Sclerae are anicteric. PERRLA, EOMI   Neck: supple, no masses.   Lungs: unlabored.   Heart: regular rate and rhythm   Abdomen: soft non tender.  Extremities: Pulses are well palpable, no peripheral edema.   Musculoskeletal: 5/5 muscle strength in all extremities.  Tenderness to palpation noted in the lower lumbar area.  Facet loading generated pain.  Neurologic exam:Sensation intact throughout all dermatomes bilateral upper extremities and lower extremities  Psychiatric; Normal  affect.   Skin: Warm and Dry.    Medications:  Current Outpatient Medications   Medication Sig Dispense Refill     albuterol (ACCUNEB) 1.25 MG/3ML neb solution Take 1 vial (1.25 mg) by nebulization every 6 hours as needed for shortness of breath / dyspnea or wheezing 3 mL 0     albuterol (PROAIR HFA/PROVENTIL HFA/VENTOLIN HFA) 108 (90 Base) MCG/ACT inhaler Inhale 2 puffs into the lungs every 6 hours 6.7 g 1     budesonide-formoterol (SYMBICORT) 160-4.5 MCG/ACT Inhaler Inhale 2 puffs into the lungs 2 times daily 10.2 g 11     buPROPion (WELLBUTRIN XL) 300 MG 24 hr tablet        citalopram (CELEXA) 40 MG tablet TK 1 T PO QD       fluticasone (FLOVENT HFA) 110 MCG/ACT inhaler Inhale 1 puff into the lungs in the morning and 1 puff in the evening. 12 g 0     gabapentin (NEURONTIN) 300 MG capsule Take 1 capsule (300 mg) by mouth 4 times daily 120 capsule 4     rosuvastatin (CRESTOR) 20 MG tablet Take 1 tablet (20 mg) by mouth daily 90 tablet 1     tiZANidine (ZANAFLEX) 4 MG tablet Take 1 tablet (4 mg) by mouth 3 times daily as needed for muscle spasms 90 tablet 3     traZODone (DESYREL) 50 MG tablet Take 300 mg by mouth At Bedtime        triamterene-HCTZ (MAXZIDE) 75-50 MG tablet Take 1 tablet by mouth daily 90 tablet 0     zolpidem (AMBIEN) 5 MG tablet Take 5 mg by mouth nightly as needed for sleep         Analgesic Medications:   Medications related to Pain Management (From now, onward)    Start     Dose/Rate Route Frequency Ordered Stop    05/12/22 0000  tiZANidine (ZANAFLEX) 4 MG tablet         4 mg Oral 3 TIMES DAILY PRN 05/12/22 1032               LABORATORY VALUES:   Recent Labs   Lab Test 04/07/21  1450 04/13/19  1627    140   POTASSIUM 4.0 4.0   CHLORIDE 106 108   CO2 29 28   ANIONGAP 6 4   * 97   BUN 15 12   CR 0.90 0.86   LARS 9.0 8.8       CBC RESULTS:   Recent Labs   Lab Test 04/13/19  1627   WBC 8.9   RBC 4.34*   HGB 12.7*   HCT 38.8*   MCV 89   MCH 29.3   MCHC 32.7   RDW 12.4           Most Recent 3 INR's:No lab results found.        ASSESSMENT:    Bilateral lumbar facet arthropathy at the L4-L5 and L5-S1 (status post bilateral facet joint injection)  Myofascial muscle pain  Midthoracic back pain       PLAN:    1. Medications.     Tizanidine 4 mg 3 times daily.  90 tablets dispensed with 3 refills.    2. Interventional procedures:    Ordered bilateral L4-L5 and L5-S1 facet injection.  Risk of the procedure include bleeding, infection, failed procedure, discussed with him.  All of his questions were answered.    3. Labs and imaging: None needed for pain management.      4. Rehab:  Advised to perform home exercise using [employer] videos  [email or web address].  The patient is also encouraged to stay active as tolerated.     5. Psychology: No current needs.     6. Integrated medicine: We discussed acupuncture therapy in the future    7. Disposition:   we will see the patient for the above-mentioned procedure.  Follow-up 2 weeks after the procedure.    Assessment will be ongoing with changes in treatment as indicated.  Benefits/risks/alternatives to treatment have been reviewed and the patient has been instructed to contact this office if they have any questions or concerns.  This plan of care has been discussed with the patient and the patient is in agreement.     William Lopez MD, PHD

## 2022-05-12 NOTE — PATIENT INSTRUCTIONS
Medications:    tiZANidine (ZANAFLEX) 4 MG tablet. Take 1 tablet (4 mg) by mouth 3 times daily as needed for muscle spasms.       *Please provide the clinic with a minium of 1 week notice, on all prescription refills.         Procedures:    Call to schedule your procedure: [phone], option #2    Bilateral lumbar L4-L5, L5-S1 intra-articular facet joint injection under x-ray guidance    Your pre-procedure instructions are below, please call our clinic if you have any questions.        Recommended Follow up:      Follow up 2 weeks after the procedure.      Please call [phone], option #1 to schedule your clinic appointment if you don't already have an appointment scheduled.      Procedure Information related to COVID-19    Please call [phone] to schedule, reschedule, or cancel your procedure appointment.   Phones are answered Monday - Friday from 08:00 - 4:30pm.  Leave a voicemail with your name, birth date, and phone number if no one is available to take your call.     You will need to be tested for COVID-19 within 4 days (96 hours) of your procedure.  You will be called to schedule your COVID test by a central scheduling team. If you have not been contacted to schedule your test within 4 days of the scheduled procedure, call [phone]    Please be aware that the turn around time for the test is approximately 24-72 hours.   If your results are still pending the day of your procedure, you will be notified as soon as possible as the procedure may be cancelled.    Please note: You will only be contacted for positive and pending results.     The procedure center staff will call you several days before the procedure to review important information that you will need to know for the day of the procedure.   Please contact the clinic if you have further questions about this information.       Information related to Scheduling and Pre-Procedure Instructions:        If you are having a Diagnostic procedure, you  will be given a Pain Diary to document your pain relief.   Please fax the completed form to our office.   fax number is [phone]    If you must reschedule your procedure more than two times, you must follow up in clinic before rescheduling again.        Preparing for your procedure    CAUTION - FAILURE TO FOLLOW THESE PRE-PROCEDURE INSTRUCTIONS WILL RESULT IN YOUR PROCEDURE BEING RESCHEDULED.      Your procedure: Bilateral lumbar L4-L5, L5-S1 intra-articular facet joint injection under x-ray guidance            You must have a  take you home after your procedure. Transportation by taxi or para-transit is okay as long as you have a responsible adult accompany you. You must provide your 's full name and contact number at time of check in.     Fasting Protocol Please have nothing to eat and drink for 2 hours before the procedure.      Medications If you take any medications, DO NOT STOP. Take your medications as usual the day of your procedure with a sip of water AT LEAST 2 HOURS PRIOR TO ARRIVAL.    Antibiotics If you are currently taking antibiotics, you must complete the entire dose 7 days prior to your scheduled procedure. You must be clear of any signs or symptoms of infection. If you begin antibiotics, please contact our clinic for instructions.     Fever, Chills, or Rash If you experience a fever of higher than 100 degrees, chills, rash, or open wounds during the one week before your procedure, please call the clinic to see if you may proceed with your procedure.      Medication Hold List  **Patients under Cardiology/Neurology care should consult their provider prior to the pain procedure to verify pre-procedure medication instructions. The information below contains general guidelines.**        Blood Thinners If you are taking daily ASPIRIN, PLAVIX, OR OTHER BLOOD THINNERS SUCH AS COUMADIN/WARFARIN, we will need your prescribing doctor to sign a release permitting you to stop these  medications. Once approved by your prescribing doctor - STOP ALL BLOOD THINNERS BASED ON THE TIME TABLE BELOW PRIOR TO YOUR PROCEDURE. If you have been instructed to stop WARFARIN(COUMADIN), you must have an INR lab drawn the day before your procedure. . Your INR must be within normal limits before we can perform your injection. MEDICATIONS CAN BE RESTARTED AFTER YOUR PROCEDURE.    14 DAY HOLD  Ticlid (ticlopidine)    10 DAY HOLD  Effient (Prasugel)    3 DAY HOLD  Xarelto (rivaroxaban) 7 DAY HOLD  Anacin, Bufferin, Ecotrin, Excedrin, Aggrenox (Aspirin)  Brilinta (ticagrelor)  Coumadin (Warfarin)  Pradexa (Dabigatran)  Elmiron (Pentosan)  Plavix (Clopidogrel Bisulfate)  Pletal (Cilostazol)    24 HOUR HOLD  Lovenox (enoxaparin)  Agrylin (Anagrelide)        Non-steroidal Anti-inflammatories (NSAIDs) DO NOT TAKE any non-steroidal anti-inflammatory medications (NSAIDs) listed on the table below. MEDICATIONS CAN BE RESTARTED AFTER YOUR PROCEDURE. Celebrex is OK to take and does not need to be discontinued.     Medications to stop:  3 DAY HOLD  Advil, Motrin (Ibuprofen)  Arthrotec (diciofenac sodium/misoprostol)  Clinoril (Sulindac)  Indocin (Indomethacin)  Lodine (Etodolac)  Toradol (Ketorolac)  Vicoprofen (Hydrocodone and Ibuprofen)  Voltaren (Diclotenac)    14 DAY HOLD  Daypro (Oxaprozin)  Feldene (Piroxicam)   7 DAY HOLD  Aleve (Naproxen sodium)  Darvon compound (contains aspirin)  Naprosyn (Naproxen)  Norgesic Forte (contains aspirin)  Mobic (Meloxicam)  Oruvall (Ketoprofen)  Percodan (contains aspirin)  Relafen (Nabumetone)  Salsalate  Trilisate  Vitamin E (more than 400 mg per day)  Any medication containing aspirin                To speak with a nurse, schedule/reschedule/cancel a clinic appointment, or request a medication refill call: [phone]     You can also reach us by [employer]: [email or web address]

## 2022-05-13 ENCOUNTER — TELEPHONE (OUTPATIENT)
Dept: ANESTHESIOLOGY | Facility: CLINIC | Age: 57
End: 2022-05-13
Payer: COMMERCIAL

## 2022-05-13 ASSESSMENT — ANXIETY QUESTIONNAIRES: GAD7 TOTAL SCORE: 0

## 2022-05-18 ENCOUNTER — TELEPHONE (OUTPATIENT)
Dept: ANESTHESIOLOGY | Facility: CLINIC | Age: 57
End: 2022-05-18
Payer: COMMERCIAL

## 2022-05-18 DIAGNOSIS — Z11.59 ENCOUNTER FOR SCREENING FOR OTHER VIRAL DISEASES: Primary | ICD-10-CM

## 2022-05-18 NOTE — TELEPHONE ENCOUNTER
Patient is scheduled for procedure with Dr. Lopez    Spoke with: Patient    Date of Procedure: 05-26-22    [place] [facility] [place]    Informed patient they will need an adult  Yes    Pre-procedure COVID-19 Test: 05-23-22    Additional comments: N/A    Patient is aware pre-op RN will call 2-3 days prior to procedure with arrival time and instructions. Yes      Stefany Jacobs on 5/18/2022 at 11:17 AM

## 2022-05-23 ENCOUNTER — LAB (OUTPATIENT)
Dept: LAB | Facility: CLINIC | Age: 57
End: 2022-05-23
Payer: COMMERCIAL

## 2022-05-23 DIAGNOSIS — Z11.59 ENCOUNTER FOR SCREENING FOR OTHER VIRAL DISEASES: ICD-10-CM

## 2022-05-23 PROCEDURE — 99000 SPECIMEN HANDLING OFFICE-LAB: CPT | Performed by: PATHOLOGY

## 2022-05-23 PROCEDURE — U0005 INFEC AGEN DETEC AMPLI PROBE: HCPCS | Mod: 90 | Performed by: PATHOLOGY

## 2022-05-23 PROCEDURE — U0003 INFECTIOUS AGENT DETECTION BY NUCLEIC ACID (DNA OR RNA); SEVERE ACUTE RESPIRATORY SYNDROME CORONAVIRUS 2 (SARS-COV-2) (CORONAVIRUS DISEASE [COVID-19]), AMPLIFIED PROBE TECHNIQUE, MAKING USE OF HIGH THROUGHPUT TECHNOLOGIES AS DESCRIBED BY CMS-2020-01-R: HCPCS | Mod: 90 | Performed by: PATHOLOGY

## 2022-05-24 LAB — SARS-COV-2 RNA RESP QL NAA+PROBE: NEGATIVE

## 2022-05-26 ENCOUNTER — HOSPITAL ENCOUNTER (OUTPATIENT)
Facility: AMBULATORY SURGERY CENTER | Age: 57
Discharge: HOME | End: 2022-05-26
Attending: ANESTHESIOLOGY | Admitting: ANESTHESIOLOGY
Payer: COMMERCIAL

## 2022-05-26 VITALS
HEIGHT: 68 IN | BODY MASS INDEX: 35.01 KG/M2 | HEART RATE: 96 BPM | SYSTOLIC BLOOD PRESSURE: 171 MMHG | RESPIRATION RATE: 16 BRPM | OXYGEN SATURATION: 93 % | WEIGHT: 231 LBS | TEMPERATURE: 97 F | DIASTOLIC BLOOD PRESSURE: 110 MMHG

## 2022-05-26 DIAGNOSIS — M54.6 CHRONIC BILATERAL THORACIC BACK PAIN: ICD-10-CM

## 2022-05-26 DIAGNOSIS — G89.29 CHRONIC BILATERAL THORACIC BACK PAIN: ICD-10-CM

## 2022-05-26 PROCEDURE — 64493 INJ PARAVERT F JNT L/S 1 LEV: CPT | Mod: XS,LT

## 2022-05-26 PROCEDURE — 64494 INJ PARAVERT F JNT L/S 2 LEV: CPT | Mod: 50 | Performed by: ANESTHESIOLOGY

## 2022-05-26 PROCEDURE — 64493 INJ PARAVERT F JNT L/S 1 LEV: CPT | Mod: 50 | Performed by: ANESTHESIOLOGY

## 2022-05-26 PROCEDURE — 64494 INJ PARAVERT F JNT L/S 2 LEV: CPT | Mod: RT

## 2022-05-26 RX ORDER — DIAZEPAM 5 MG
5 TABLET ORAL ONCE
Status: COMPLETED | OUTPATIENT
Start: 2022-05-26 | End: 2022-05-26

## 2022-05-26 RX ORDER — BUPIVACAINE HYDROCHLORIDE 2.5 MG/ML
INJECTION, SOLUTION EPIDURAL; INFILTRATION; INTRACAUDAL PRN
Status: DISCONTINUED | OUTPATIENT
Start: 2022-05-26 | End: 2022-05-26 | Stop reason: HOSPADM

## 2022-05-26 RX ORDER — TRIAMCINOLONE ACETONIDE 40 MG/ML
INJECTION, SUSPENSION INTRA-ARTICULAR; INTRAMUSCULAR PRN
Status: DISCONTINUED | OUTPATIENT
Start: 2022-05-26 | End: 2022-05-26 | Stop reason: HOSPADM

## 2022-05-26 RX ORDER — IOPAMIDOL 408 MG/ML
INJECTION, SOLUTION INTRATHECAL PRN
Status: DISCONTINUED | OUTPATIENT
Start: 2022-05-26 | End: 2022-05-26 | Stop reason: HOSPADM

## 2022-05-26 RX ORDER — LIDOCAINE HYDROCHLORIDE 10 MG/ML
INJECTION, SOLUTION EPIDURAL; INFILTRATION; INTRACAUDAL; PERINEURAL PRN
Status: DISCONTINUED | OUTPATIENT
Start: 2022-05-26 | End: 2022-05-26 | Stop reason: HOSPADM

## 2022-05-26 RX ADMIN — DIAZEPAM 5 MG: 5 TABLET ORAL at 12:21

## 2022-05-26 NOTE — OP NOTE
Patient: Babar Bhatt Age: 56 year old   MRN: [id number] Attending: Dr. Lopez           PAIN MEDICINE CLINIC PROCEDURE NOTE     ATTENDING CLINICIAN:    William Lopez MD       PREPROCEDURE DIAGNOSES:  1.  Low back pain  2.  Lumbar facet arthropathy        PROCEDURE(S) PERFORMED:  1.  Bilateral lumbar L4-5, and L5-S1 facet joint steroid  injections (not diagnostic)  2.  Fluoroscopic guidance for the above-named procedure(s)        ANESTHESIA:  Local.     BLOOD LOSS:  Minimal.     DRAINS AND SPECIMENS:  None.     COMPLICATIONS:  None.     INDICATIONS:  Babar Bhatt is a 56 year old male with a history of low back pain.  The patient stated that he was in usual state of health and denied recent anticoagulant use or recent infections.  Therefore, the plan is to perform above mentioned procedure.      Procedure Details:  The patient was met in the procedure room, where the patient was identified by name, medical record number and date of birth.  All of the patient s last minute questions were answered. Written informed consent was obtained and saved in the electronic medical record, after the risks, benefits, and alternatives were discussed with the patient.       A formal time-out procedure was performed by Dr. Lopez, as per protocol, including patient name, title of procedure, and site of procedure, and all in the room concurred.  Routine monitors were applied.       The patient was placed in the prone position on the procedure room table.  All pressure points were checked and comfortably padded.  Routine monitors were placed.  Vital signs were stable.     A chlorhexidine prep was completed followed by sterile draping per standard procedure.      The anatomical target site was determined using fluoroscopy by squaring off the superior endplate. AP fluoroscopic guidance was used to identify the right L4-L5 facet joint. Then ipsilateral oblique tilt was used  in order to maximally visualize the joint space.  The skin  overlying these joints was anesthetized with 1% lidocaine, using a 25 gauge, 1.5 inch needle.  Next, a 3.5 inch spinal needle was introduced and advanced through the anesthetized tract and advanced to the joint space at the above-named level.  Appropriate depth was confirmed with lateral fluoroscopic guidance.  This same procedure was repeated at the right L5-S1, left L4-5 and left L5-S1 facet joints     After negative aspiration for heme, CSF, or air, 1 mL of a treatment mixture containing 1 mL of triamcinolone and 5 mL of bupivacaine 0.25% was injected at each of the above named levels.  Addition 1 ml of the solution was injected into the periarticular space. The needles were then removed.      Light pressure was held at the puncture site(s) to prevent ecchymosis and oozing.  The patient's skin was cleansed, and hemostasis was confirmed.  Band-aids were applied to the needle injection site(s).       Condition:     The patient remained awake and alert throughout the procedure.  The patient tolerated the procedure well and was monitored for approximately 15 minutes afterward in the post procedure area.  There were no immediate post procedure complications noted.  The patient was then discharged to home as per protocol.  The patient will follow up in the outpatient clinic in 4 week(s), unless otherwise clinically indicated.           Patient condition  stable.     Preprocedure pain score: 8 out of 10  Postprocedure pain score: 0 out of 10

## 2022-05-26 NOTE — DISCHARGE INSTRUCTIONS
PAIN INJECTION HOME CARE INSTRUCTIONS  Activity  -You may resume most normal activity levels with the exception of strenuous activity. It may help to move in ways that hurt before the injection, to see if the pain is still there, but do not overdo it. Take it easy for the rest of the day.    -DO NOT remove bandaid for 24 hours  -DO NOT shower for 24 hours      Pain  -You may feel immediate pain relief and numbness for a period of time after the injection. This may indicate the medication has reached the right spot.  -Your pain may return after this short pain-free period, or may even be a little worse for a day or two. It may be caused by needle irritation or by the medication itself. The medications usually take two or three days to start working, but can take as long as a week.    -You may use an ice pack for 20 minutes every 2 hours for the first 24 hours  -You may use a heating pad after the first 24 hours  -You may use Tylenol (acetaminophen) every 4 hours or other pain medicines as directed by your physician        DID YOU RECEIVE STEROIDS TODAY?    Common side effects of steroids:  Not everyone will experience corticosteroid side effects. If side effects are experienced, they will gradually subside in the 7-10 day period following an injection. Most common side effects include:  -Flushed face and/or chest  -Feeling of warmth, particularly in the face but could be an overall feeling of warmth  -Increased blood sugar in diabetic patients  -Menstrual irregularities my occur. If taking hormone-based birth control an alternate method of birth control is recommended  -Sleep disturbances and/or mood swings are possible  -Leg cramps    PLEASE KEEP TRACK OF YOUR SYMPTOMS AND NOTE ANY CHANGES FOR YOUR DOCTOR.       Please contact us if you have:  -Severe pain  -Fever more than 101.5 degrees Fahrenheit  -Signs of infection at the injection site (redness, swelling, or drainage)      FOR PAIN CENTER PATIENT:  If you have  questions, please contact the Pain Clinic at [phone] Option #1 between the hours of 7:00 am and 3:00 pm Monday through Friday. After office hours you can contact the on call provider by dialing [phone]. If you need immediate attention, we recommend that you go to a hospital emergency room or dial 129.      FOR PM&R PATIENTS of Dr Powell:  For patients seen by the PM and R service, please call [phone].(Monday through Friday 8a-5p.  After business hours and weekends call [phone] and ask for the PM and R doctor on call). If you need to fax a pain diary to PM&R the fax number is [phone]. If you are unable to fax, uploading to [employer] is encouraged, then send to provider. If you have procedure scheduling questions please call [phone] Option #2

## 2022-06-08 DIAGNOSIS — J44.9 CHRONIC OBSTRUCTIVE PULMONARY DISEASE, UNSPECIFIED COPD TYPE (H): ICD-10-CM

## 2022-06-09 DIAGNOSIS — M47.26 OSTEOARTHRITIS OF SPINE WITH RADICULOPATHY, LUMBAR REGION: ICD-10-CM

## 2022-06-09 RX ORDER — ALBUTEROL SULFATE 90 UG/1
2 AEROSOL, METERED RESPIRATORY (INHALATION) EVERY 6 HOURS
Qty: 6.7 G | Refills: 0 | Status: SHIPPED | OUTPATIENT
Start: 2022-06-09 | End: 2022-07-05

## 2022-06-09 RX ORDER — GABAPENTIN 300 MG/1
300 CAPSULE ORAL 4 TIMES DAILY
Qty: 120 CAPSULE | Refills: 0 | Status: SHIPPED | OUTPATIENT
Start: 2022-06-09 | End: 2022-07-07

## 2022-06-13 ENCOUNTER — TELEPHONE (OUTPATIENT)
Dept: FAMILY MEDICINE | Facility: CLINIC | Age: 57
End: 2022-06-13
Payer: COMMERCIAL

## 2022-06-13 NOTE — TELEPHONE ENCOUNTER
Reason for Call:  Other appointment    Detailed comments: patient called and would like to see any provider at [facility] on Monday next week.  Had to cancel appt today. In person.  Please contact patient.  Thank you.    Phone Number Patient can be reached at: Home number on file [phone] (home)    Best Time: any    Can we leave a detailed message on this number? YES    Call taken on 6/13/2022 at 7:55 AM by Cleo Harper

## 2022-06-28 ENCOUNTER — TELEPHONE (OUTPATIENT)
Dept: ANESTHESIOLOGY | Facility: CLINIC | Age: 57
End: 2022-06-28

## 2022-06-28 NOTE — TELEPHONE ENCOUNTER
[facility] Call Center    Phone Message    May a detailed message be left on voicemail: yes     Reason for Call: Other: Patient requesting 06/30/2022 virtual visit with Dr. Lopez be changed from video to telephone. Please review request and let patient know if this is possible.     Action Taken: Message routed to:  [facility] ([facility] Pain    Travel Screening: Not Applicable

## 2022-06-28 NOTE — TELEPHONE ENCOUNTER
I called pt regarding his Video Visit with Dr Lopez that I could help get onto mycart he refused I also told him its best to keep appointment Dr Jessica wilkinson is full for the next couple of weeks.    I told him he could send text link to connect for visit he stated he would keep his appointment    Routing to Clinic Coordinator

## 2022-06-30 ENCOUNTER — VIRTUAL VISIT (OUTPATIENT)
Dept: ANESTHESIOLOGY | Facility: CLINIC | Age: 57
End: 2022-06-30
Payer: COMMERCIAL

## 2022-06-30 DIAGNOSIS — M47.816 LUMBAR SPONDYLOSIS: Primary | ICD-10-CM

## 2022-06-30 PROCEDURE — 99214 OFFICE O/P EST MOD 30 MIN: CPT | Mod: 95 | Performed by: ANESTHESIOLOGY

## 2022-06-30 RX ORDER — DIAZEPAM 5 MG
5 TABLET ORAL ONCE
Status: CANCELLED | OUTPATIENT
Start: 2022-06-30 | End: 2022-06-30

## 2022-06-30 ASSESSMENT — PAIN SCALES - GENERAL: PAINLEVEL: EXTREME PAIN (8)

## 2022-06-30 NOTE — NURSING NOTE
Patient presents with:  Follow Up: Follow-up Video Backto discuss Cortisone shot Pain Level 8/10      Extreme Pain (8)         What medications are you using for pain? Gabapentin, trazadone    (New patients only) Have you been seen by another pain clinic/ provider? no    (Return Patients only) What refills are you needing today? no

## 2022-06-30 NOTE — PROGRESS NOTES
[facility] for Comprehensive Chronic Pain Management : Progress Note    VIRTUAL VISIT:  Date of visit: 6/30/2022    Interval history:    Babar Bhatt is a 56 year old male  is known to our clinic for chronic back pain.    He had first saw Ritika Jorge NP and then saw Dr. Lopez.  His past medical history significant for hypertension, depression, COPD.  His pain located mostly in the lower back, pain is dull and aching in nature.  It does not radiate.His lumbar MRI performed on 7/2022 showed bilateral facet arthropathy at L4-L5 and L5-S1 level.   He responded very well with bilateral facet joint injections. He has been having these injections every 4 to 5 months.  Last injection was on February 2022.  He wanted to repeat the injections.  He also reports some myofascial pain in the mid thoracic back region.  Although he is scheduled for trigger point injection today, he states that his pain got significantly better after starting on tizanidine.  Therefore would like to hold on the injection and ask for tizanidine refill.      Since the last visit, Babar Bhatt reports:  He underwent B/L facet joint injections bilateral L4-L5 and L5-S1.  He states that he got minimal pain relief from this injection.  He previously had a similar injection with excellent pain relief for several months.Pain is still located the lower back, 6-8/10 in intensity, gets worse at the end of day with standing as   Bending forward makes It better and standing makes it worse. He is consistently taking tizanidine and gabapentin with some relief.  He denied any other complaints. He denied any nausea, vomiting, headache, chest pain, fevers today    Medications:    Tizanidine    Gabapentin     Trazodone     Recommendations/plan at the last visit included:  Follow up after last set of,  B/L L4 L5 S1 facet joint injections 5/26/2022    [place] Prescription Monitoring Program:   Reviewed. No concerns     Review of  Systems:  The 14 system ROS was reviewed and was negative except what is documented above and as follows.  Any bowel or bladder problems: none  Mood: okay    Physical Exam:    General: Awake in no apparent distress.   Eyes:  EOMI   Neck: supple, no masses.   Lungs: unlabored.   Psychiatric; Normal affect.   Skin: Visible skin without any skin changes    Musculoskeletal: 5 out of 5 muscle strength in bilateral upper and lower extremities.  Paraspinal tenderness noted over the lower back.  Facet loading is positive.  Sacroiliac joints are nontender.    Medications:  Current Outpatient Medications   Medication Sig Dispense Refill     albuterol (ACCUNEB) 1.25 MG/3ML neb solution Take 1 vial (1.25 mg) by nebulization every 6 hours as needed for shortness of breath / dyspnea or wheezing 3 mL 0     albuterol (PROAIR HFA/PROVENTIL HFA/VENTOLIN HFA) 108 (90 Base) MCG/ACT inhaler Inhale 2 puffs into the lungs every 6 hours 6.7 g 0     budesonide-formoterol (SYMBICORT) 160-4.5 MCG/ACT Inhaler Inhale 2 puffs into the lungs 2 times daily 6 g 0     buPROPion (WELLBUTRIN XL) 300 MG 24 hr tablet        citalopram (CELEXA) 40 MG tablet TK 1 T PO QD       fluticasone (FLOVENT HFA) 110 MCG/ACT inhaler Inhale 1 puff into the lungs in the morning and 1 puff in the evening. 12 g 0     gabapentin (NEURONTIN) 300 MG capsule Take 1 capsule (300 mg) by mouth 4 times daily for 30 days 120 capsule 0     rosuvastatin (CRESTOR) 20 MG tablet Take 1 tablet (20 mg) by mouth daily 90 tablet 1     tiZANidine (ZANAFLEX) 4 MG tablet Take 1 tablet (4 mg) by mouth 3 times daily as needed for muscle spasms 90 tablet 3     traZODone (DESYREL) 50 MG tablet Take 300 mg by mouth At Bedtime        triamterene-HCTZ (MAXZIDE) 75-50 MG tablet Take 1 tablet by mouth daily 90 tablet 0     zolpidem (AMBIEN) 5 MG tablet Take 5 mg by mouth nightly as needed for sleep         Analgesic Medications:   Medications related to Pain Management (From now, onward)    None              LABORATORY VALUES:   Recent Labs   Lab Test 04/07/21  1450 04/13/19  1627    140   POTASSIUM 4.0 4.0   CHLORIDE 106 108   CO2 29 28   ANIONGAP 6 4   * 97   BUN 15 12   CR 0.90 0.86   LARS 9.0 8.8       CBC RESULTS:   Recent Labs   Lab Test 04/13/19  1627   WBC 8.9   RBC 4.34*   HGB 12.7*   HCT 38.8*   MCV 89   MCH 29.3   MCHC 32.7   RDW 12.4          Most Recent 3 INR's:No lab results found.    ASSESSMENT:  Babar Bhatt is a 56 year old male  is known to me for chronic back pain.  His past medical history significant for hypertension, depression, COPD.  His pain located mostly in the lower back, pain is dull and aching in nature. He underwent second B/L facet joint injections L4, L5, S1 (5/26).  Although he got very good relief from the first injection, he states that he got minimal pain relief from the second 1.  We discussed about diagnostic medial branch nerve block with a goal of radiofrequency ablation of the lumbar medial branch nerve.  He is agreeable to proceed.    Diagnoses       Codes Comments    Lumbar spondylosis    -  Primary M47.816         PLAN:    1. Medications.   a. Continue Tizanidine and gabapentin as needed    2. Interventional procedures:  -We will schedule diagnostic lumbar L3, L4, L5 MBB's pending insurance approval. We  will consider Lumbar RFA's  If diagnostic blocks are helpful.    3. Labs and imaging: None needed for pain management.  4. Rehab:  Continue home exercise program     5. Psychology: No current needs.    6. Integrated medicine: None at this time.    7. Disposition:  The patient will follow up in our outpatient clinic in 8 weeks or earlier if clinically indicated.     Patient was seen and discussed with my staff physician Dr. Lopez, who agrees with my assessment and plan.    Holger Cates   PGY 3  Physical Medicine and Rehabilitation  Pager: [phone]    Physician Attestation   I saw and evaluated Babar Bhatt.  I agree with above history, review  of systems, physical exam and plan. I have reviewed the content of the documentation and have edited it as needed. I have personally performed the services documented here and the documentation accurately represents those services and the decisions I have made.     William Lopez MD, PhD    [facility] PA[facility]NAG[facility] [address]  [address] [facility] [id number] [phone]  Dept: [phone]

## 2022-06-30 NOTE — LETTER
6/30/2022       RE: Babar Bhatt  [address] [facility] [address]     Dear Colleague,    Thank you for referring your patient, Babar Bhatt, to the [facility] [place] [facility] [place]. Please see a copy of my visit note below.    [facility] for Comprehensive Chronic Pain Management : Progress Note    VIRTUAL VISIT:  Date of visit: 6/30/2022    Interval history:    Babar Bhatt is a 56 year old male  is known to our clinic for chronic back pain.    He had first saw Ritika Jorge NP and then saw Dr. Lopez.  His past medical history significant for hypertension, depression, COPD.  His pain located mostly in the lower back, pain is dull and aching in nature.  It does not radiate.His lumbar MRI performed on 7/2022 showed bilateral facet arthropathy at L4-L5 and L5-S1 level.   He responded very well with bilateral facet joint injections. He has been having these injections every 4 to 5 months.  Last injection was on February 2022.  He wanted to repeat the injections.  He also reports some myofascial pain in the mid thoracic back region.  Although he is scheduled for trigger point injection today, he states that his pain got significantly better after starting on tizanidine.  Therefore would like to hold on the injection and ask for tizanidine refill.      Since the last visit, Babar Bhatt reports:  He underwent B/L facet joint injections bilateral L4-L5 and L5-S1.  He states that he got minimal pain relief from this injection.  He previously had a similar injection with excellent pain relief for several months.Pain is still located the lower back, 6-8/10 in intensity, gets worse at the end of day with standing as   Bending forward makes It better and standing makes it worse. He is consistently taking tizanidine and gabapentin with some relief.  He denied any other complaints.  He denied any nausea, vomiting, headache, chest pain, fevers today    Medications:    Tizanidine    Gabapentin     Trazodone     Recommendations/plan at the last visit included:  Follow up after last set of,  B/L L4 L5 S1 facet joint injections 5/26/2022    [place] Prescription Monitoring Program:   Reviewed. No concerns     Review of Systems:  The 14 system ROS was reviewed and was negative except what is documented above and as follows.  Any bowel or bladder problems: none  Mood: okay    Physical Exam:    General: Awake in no apparent distress.   Eyes:  EOMI   Neck: supple, no masses.   Lungs: unlabored.   Psychiatric; Normal affect.   Skin: Visible skin without any skin changes    Musculoskeletal: 5 out of 5 muscle strength in bilateral upper and lower extremities.  Paraspinal tenderness noted over the lower back.  Facet loading is positive.  Sacroiliac joints are nontender.    Medications:  Current Outpatient Medications   Medication Sig Dispense Refill     albuterol (ACCUNEB) 1.25 MG/3ML neb solution Take 1 vial (1.25 mg) by nebulization every 6 hours as needed for shortness of breath / dyspnea or wheezing 3 mL 0     albuterol (PROAIR HFA/PROVENTIL HFA/VENTOLIN HFA) 108 (90 Base) MCG/ACT inhaler Inhale 2 puffs into the lungs every 6 hours 6.7 g 0     budesonide-formoterol (SYMBICORT) 160-4.5 MCG/ACT Inhaler Inhale 2 puffs into the lungs 2 times daily 6 g 0     buPROPion (WELLBUTRIN XL) 300 MG 24 hr tablet        citalopram (CELEXA) 40 MG tablet TK 1 T PO QD       fluticasone (FLOVENT HFA) 110 MCG/ACT inhaler Inhale 1 puff into the lungs in the morning and 1 puff in the evening. 12 g 0     gabapentin (NEURONTIN) 300 MG capsule Take 1 capsule (300 mg) by mouth 4 times daily for 30 days 120 capsule 0     rosuvastatin (CRESTOR) 20 MG tablet Take 1 tablet (20 mg) by mouth daily 90 tablet 1     tiZANidine (ZANAFLEX) 4 MG tablet Take 1 tablet (4 mg) by mouth 3 times daily as needed for muscle spasms 90 tablet 3      traZODone (DESYREL) 50 MG tablet Take 300 mg by mouth At Bedtime        triamterene-HCTZ (MAXZIDE) 75-50 MG tablet Take 1 tablet by mouth daily 90 tablet 0     zolpidem (AMBIEN) 5 MG tablet Take 5 mg by mouth nightly as needed for sleep         Analgesic Medications:   Medications related to Pain Management (From now, onward)    None             LABORATORY VALUES:   Recent Labs   Lab Test 04/07/21  1450 04/13/19  1627    140   POTASSIUM 4.0 4.0   CHLORIDE 106 108   CO2 29 28   ANIONGAP 6 4   * 97   BUN 15 12   CR 0.90 0.86   LARS 9.0 8.8       CBC RESULTS:   Recent Labs   Lab Test 04/13/19  1627   WBC 8.9   RBC 4.34*   HGB 12.7*   HCT 38.8*   MCV 89   MCH 29.3   MCHC 32.7   RDW 12.4          Most Recent 3 INR's:No lab results found.    ASSESSMENT:  Babar Bhatt is a 56 year old male  is known to me for chronic back pain.  His past medical history significant for hypertension, depression, COPD.  His pain located mostly in the lower back, pain is dull and aching in nature. He underwent second B/L facet joint injections L4, L5, S1 (5/26).  Although he got very good relief from the first injection, he states that he got minimal pain relief from the second 1.  We discussed about diagnostic medial branch nerve block with a goal of radiofrequency ablation of the lumbar medial branch nerve.  He is agreeable to proceed.    Diagnoses       Codes Comments    Lumbar spondylosis    -  Primary M47.816         PLAN:    1. Medications.   a. Continue Tizanidine and gabapentin as needed    2. Interventional procedures:  -We will schedule diagnostic lumbar L3, L4, L5 MBB's pending insurance approval. We  will consider Lumbar RFA's  If diagnostic blocks are helpful.    3. Labs and imaging: None needed for pain management.  4. Rehab:  Continue home exercise program     5. Psychology: No current needs.    6. Integrated medicine: None at this time.    7. Disposition:  The patient will follow up in our outpatient  clinic in 8 weeks or earlier if clinically indicated.     Patient was seen and discussed with my staff physician Dr. Lopez, who agrees with my assessment and plan.    Holger Cates   PGY 3  Physical Medicine and Rehabilitation  Pager: [phone]    Physician Attestation   I saw and evaluated Babar Bhatt.  I agree with above history, review of systems, physical exam and plan. I have reviewed the content of the documentation and have edited it as needed. I have personally performed the services documented here and the documentation accurately represents those services and the decisions I have made.       William Loepz MD, PhD    [facility] FOR COMPREHENS[facility] PAIN MANAGEMENT [address]  5TH FLO[address]  [facility] [address] [phone]  Dept: [phone]

## 2022-06-30 NOTE — PATIENT INSTRUCTIONS
Procedures:    Call to schedule your procedure: [phone], option #2    Medial Branch Blocks.    Your pre-procedure instructions are below, please call our clinic if you have any questions.        Recommended Follow up:      Follow up the day after procedure with pain diary results.         Please call [phone], option #1 to schedule your clinic appointment if you don't already have an appointment scheduled.      Procedure Information related to COVID-19    Please call [phone] to schedule, reschedule, or cancel your procedure appointment.   Phones are answered Monday - Friday from 08:00 - 4:30pm.  Leave a voicemail with your name, birth date, and phone number if no one is available to take your call.       You will need to be tested for COVID-19 before your procedure. If you're going home on the same day as your procedure (surgery), you may take an at-home rapid antigen test 1 to 2 days before your procedure. If your test is negative, please take a photo of the test. Show the photo to the nurse when you come in for your procedure. If your test is positive, please update our office right away and your procedure may have to be postponed.    If you do not have access to an at-home rapid test, you will have to be tested at a lab within 4 days of your procedure.  You will be called to schedule your COVID test by a central scheduling team. If you have not been contacted to schedule your test within 4 days of the scheduled procedure, call [phone]    Please be aware that the turn around time for the test is approximately 24-72 hours.   If your results are still pending the day of your procedure, you will be notified as soon as possible as the procedure may be cancelled.    Please note: You will only be contacted for positive and pending results.     The procedure center staff will call you several days before the procedure to review important information that you will need to know for the day of the  procedure.   Please contact the clinic if you have further questions about this information.       Information related to Scheduling and Pre-Procedure Instructions:        If you are having a Diagnostic procedure, you will be given a Pain Diary to document your pain relief.   Please fax the completed form to our office.   fax number is [phone]    If you must reschedule your procedure more than two times, you must follow up in clinic before rescheduling again.        Preparing for your procedure    CAUTION - FAILURE TO FOLLOW THESE PRE-PROCEDURE INSTRUCTIONS WILL RESULT IN YOUR PROCEDURE BEING RESCHEDULED.      Your procedure: Medial Branch Blocks            You must have a  take you home after your procedure. Transportation by taxi or para-transit is okay as long as you have a responsible adult accompany you. You must provide your 's full name and contact number at time of check in.     Fasting Protocol Please have nothing to eat and drink for 2 hours before the procedure.      Medications If you take any medications, DO NOT STOP. Take your medications as usual the day of your procedure with a sip of water AT LEAST 2 HOURS PRIOR TO ARRIVAL.    Antibiotics If you are currently taking antibiotics, you must complete the entire dose 7 days prior to your scheduled procedure. You must be clear of any signs or symptoms of infection. If you begin antibiotics, please contact our clinic for instructions.     Fever, Chills, or Rash If you experience a fever of higher than 100 degrees, chills, rash, or open wounds during the one week before your procedure, please call the clinic to see if you may proceed with your procedure.      Medication Hold List  **Patients under Cardiology/Neurology care should consult their provider prior to the pain procedure to verify pre-procedure medication instructions. The information below contains general guidelines.**      Blood Thinners If you are taking daily ASPIRIN,  PLAVIX, OR OTHER BLOOD THINNERS SUCH AS COUMADIN/WARFARIN, we will need your prescribing doctor to sign a release permitting you to stop these medications. Once approved by your prescribing doctor - STOP ALL BLOOD THINNERS BASED ON THE TIME TABLE BELOW PRIOR TO YOUR PROCEDURE. If you have been instructed to stop WARFARIN(COUMADIN), you must have an INR lab drawn the day before your procedure. . Your INR must be within normal limits before we can perform your injection. MEDICATIONS CAN BE RESTARTED AFTER YOUR PROCEDURE.    14 DAY HOLD  Ticlid (ticlopidine)    10 DAY HOLD  Effient (Prasugel)    3 DAY HOLD  Xarelto (rivaroxaban) 7 DAY HOLD  Anacin, Bufferin, Ecotrin, Excedrin, Aggrenox (Aspirin)  Brilinta (ticagrelor)  Coumadin (Warfarin)  Pradexa (Dabigatran)  Elmiron (Pentosan)  Plavix (Clopidogrel Bisulfate)  Pletal (Cilostazol)    24 HOUR HOLD  Lovenox (enoxaparin)  Agrylin (Anagrelide)        Non-steroidal Anti-inflammatories (NSAIDs) DO NOT TAKE any non-steroidal anti-inflammatory medications (NSAIDs) listed on the table below. MEDICATIONS CAN BE RESTARTED AFTER YOUR PROCEDURE. Celebrex is OK to take and does not need to be discontinued.     Medications to stop:  3 DAY HOLD  Advil, Motrin (Ibuprofen)  Arthrotec (diciofenac sodium/misoprostol)  Clinoril (Sulindac)  Indocin (Indomethacin)  Lodine (Etodolac)  Toradol (Ketorolac)  Vicoprofen (Hydrocodone and Ibuprofen)  Voltaren (Diclotenac)    14 DAY HOLD  Daypro (Oxaprozin)  Feldene (Piroxicam)   7 DAY HOLD  Aleve (Naproxen sodium)  Darvon compound (contains aspirin)  Naprosyn (Naproxen)  Norgesic Forte (contains aspirin)  Mobic (Meloxicam)  Oruvall (Ketoprofen)  Percodan (contains aspirin)  Relafen (Nabumetone)  Salsalate  Trilisate  Vitamin E (more than 400 mg per day)  Any medication containing aspirin                To speak with a nurse, schedule/reschedule/cancel a clinic appointment, or request a medication refill call: [phone]     You can also reach  us by [email or web address]: [email or web address]

## 2022-07-05 ENCOUNTER — TELEPHONE (OUTPATIENT)
Dept: ANESTHESIOLOGY | Facility: CLINIC | Age: 57
End: 2022-07-05

## 2022-07-05 NOTE — TELEPHONE ENCOUNTER
RN called patient and left a voicemail to discuss pre-procedure instructions for upcoming procedure with Dr. Lopez on 7/14. Instructions sent via patient's mailing address for review. Left call back number to pain clinic to discuss if patient has any questions or takes any blood thinners.     Joanne Ortiz RN

## 2022-07-07 DIAGNOSIS — M47.26 OSTEOARTHRITIS OF SPINE WITH RADICULOPATHY, LUMBAR REGION: ICD-10-CM

## 2022-07-07 RX ORDER — GABAPENTIN 300 MG/1
300 CAPSULE ORAL 4 TIMES DAILY
Qty: 120 CAPSULE | Refills: 0 | Status: SHIPPED | OUTPATIENT
Start: 2022-07-07 | End: 2022-08-04

## 2022-07-11 ENCOUNTER — LAB (OUTPATIENT)
Dept: LAB | Facility: CLINIC | Age: 57
End: 2022-07-11
Payer: COMMERCIAL

## 2022-07-11 DIAGNOSIS — Z11.59 ENCOUNTER FOR SCREENING FOR OTHER VIRAL DISEASES: ICD-10-CM

## 2022-07-11 PROCEDURE — U0003 INFECTIOUS AGENT DETECTION BY NUCLEIC ACID (DNA OR RNA); SEVERE ACUTE RESPIRATORY SYNDROME CORONAVIRUS 2 (SARS-COV-2) (CORONAVIRUS DISEASE [COVID-19]), AMPLIFIED PROBE TECHNIQUE, MAKING USE OF HIGH THROUGHPUT TECHNOLOGIES AS DESCRIBED BY CMS-2020-01-R: HCPCS | Mod: 90 | Performed by: PATHOLOGY

## 2022-07-11 PROCEDURE — 99000 SPECIMEN HANDLING OFFICE-LAB: CPT | Performed by: PATHOLOGY

## 2022-07-11 PROCEDURE — U0005 INFEC AGEN DETEC AMPLI PROBE: HCPCS | Mod: 90 | Performed by: PATHOLOGY

## 2022-07-12 LAB — SARS-COV-2 RNA RESP QL NAA+PROBE: NEGATIVE

## 2022-07-14 ENCOUNTER — HOSPITAL ENCOUNTER (OUTPATIENT)
Facility: AMBULATORY SURGERY CENTER | Age: 57
Discharge: HOME | End: 2022-07-14
Attending: ANESTHESIOLOGY | Admitting: ANESTHESIOLOGY
Payer: COMMERCIAL

## 2022-07-14 VITALS
TEMPERATURE: 97.7 F | RESPIRATION RATE: 16 BRPM | OXYGEN SATURATION: 98 % | WEIGHT: 231 LBS | HEART RATE: 86 BPM | BODY MASS INDEX: 35.01 KG/M2 | DIASTOLIC BLOOD PRESSURE: 96 MMHG | SYSTOLIC BLOOD PRESSURE: 149 MMHG | HEIGHT: 68 IN

## 2022-07-14 PROCEDURE — 64494 INJ PARAVERT F JNT L/S 2 LEV: CPT | Mod: 50 | Performed by: ANESTHESIOLOGY

## 2022-07-14 PROCEDURE — 64493 INJ PARAVERT F JNT L/S 1 LEV: CPT | Mod: 50 | Performed by: ANESTHESIOLOGY

## 2022-07-14 PROCEDURE — 64493 INJ PARAVERT F JNT L/S 1 LEV: CPT | Mod: 50

## 2022-07-14 RX ORDER — LIDOCAINE HYDROCHLORIDE 10 MG/ML
INJECTION, SOLUTION EPIDURAL; INFILTRATION; INTRACAUDAL; PERINEURAL PRN
Status: DISCONTINUED | OUTPATIENT
Start: 2022-07-14 | End: 2022-07-14 | Stop reason: HOSPADM

## 2022-07-14 RX ORDER — BUPIVACAINE HYDROCHLORIDE 2.5 MG/ML
INJECTION, SOLUTION EPIDURAL; INFILTRATION; INTRACAUDAL PRN
Status: DISCONTINUED | OUTPATIENT
Start: 2022-07-14 | End: 2022-07-14 | Stop reason: HOSPADM

## 2022-07-14 RX ORDER — DIAZEPAM 5 MG
5 TABLET ORAL ONCE
Status: COMPLETED | OUTPATIENT
Start: 2022-07-14 | End: 2022-07-14

## 2022-07-14 RX ADMIN — DIAZEPAM 5 MG: 5 TABLET ORAL at 12:28

## 2022-07-14 NOTE — DISCHARGE INSTRUCTIONS
Home Care Instructions after a Medial Branch Block      In a medial branch block, a local anesthetic (numbing medicine) is injected near the medial branch nerve. This stops the transmission of pain signals from the facet joint. If this reduces your pain and improves your mobility, it may tell the doctor which facet joint is causing the pain. This procedure is a diagnostic procedure and is typically short lasting. With this injection, a steroid to increase the longevity of the blocks effect may or may not be used.    Activity  -You may resume most normal activity levels with the exception of strenuous activity. It is important for us to know if your pain with normal activity is relieved after this injection.  -DO NOT shower for 24 hours  -DO NOT remove bandaid for 24 hours    Pain  -You may experience soreness at the injection site for one or two days  -You may use an ice pack for 20 minutes every 2 hours for the first 24 hours  -You may use a heating pad after the first 24 hours  -You may use Tylenol (acetaminophen) every 4 hours or other pain medicines as     directed by your physician    You may experience numbness radiating into your legs or arms (depending on the procedure location). This numbness may last several hours. Until sensation returns to normal; please use caution in walking, climbing stairs, and stepping out of your vehicle, etc.    DID YOU RECEIVE SEDATION TODAY?  No    If you received sedation please follow these additional safety measures.  Sedation medicine, if given, may remain active for many hours. It is important for the next 24 hours that you do not:  -Drive a car  -Operate machines or power tools  -Consume alcohol, including beer  -Sign any important papers or legal documents      Common side effects of steroids:  Not everyone will experience corticosteroid side effects. If side effects are experienced, they will gradually subside in the 7-10 day period following an injection. Most common  side effects include:  -Flushed face and/or chest  -Feeling of warmth, particularly in the face but could be an overall feeling of warmth  -Increased blood sugar in diabetic patients  -Menstrual irregularities my occur. If taking hormone-based birth control an alternate method of birth control is recommended  -Sleep disturbances and/or mood swings are possible  -Leg cramps      PLEASE KEEP TRACK OF YOUR SYMPTOMS AND NOTE YOUR IMPROVEMENT FOR YOUR DOCTOR.     Fill out the pain diary and fax it back to us. You do not need to call us if the pain diary was faxed.   If you do not have access to a fax machine, attach it to IPextreme.  You do not need to call us if the pain diary was attached to IPextreme.   If you cannot fax or send via IPextreme, then call our call center and request to speak with a nurse for follow up after a procedure   Please contact us if you have:  -Severe pain  -Fever more than 101.5 degrees Fahrenheit  -Signs of infection at the injection site (redness, swelling, or drainage)    FOR PAIN CENTER PATIENTS:  If you have questions, please contact the Pain Clinic at [phone] Option #1 between the hours of 7:00 am and 3:00 pm Monday through Friday. After office hours you can contact the on call provider by dialing [phone]. If you need immediate attention, we recommend that you go to a hospital emergency room or dial [phone].      FOR PM&R PATIENTS:  For patients seen by the PM and R service, please call [phone]. If you need to fax a pain diary to PM&R the fax number is [phone]. If you are unable to fax, uploading to [employer] is encouraged, then send to provider. If you have procedure scheduling questions please call [phone] Option #2

## 2022-07-14 NOTE — OP NOTE
Patient: Babar Bhatt Age: 56 year old   MRN: [id number] Attending: Dr. Lopez     Date of Visit: July 14, 2022      PAIN MEDICINE CLINIC PROCEDURE NOTE    ATTENDING CLINICIAN:    William Lopez MD    PREPROCEDURE DIAGNOSES:  1. Lumbar facet arthropathy   2. Chronic low back pain     PROCEDURE(S) PERFORMED:  1. Bilateral L3 and L4 medial branch nerve and L5 dorsal ramus nerve blocks to target bilateral L4/5 and L5/S1 facet joints  2.  Fluoroscopic guidance for the above procedures    ANESTHESIA:  Local.    BLOOD LOSS:  Minimal.    DRAINS AND SPECIMENS:  None.    COMPLICATIONS:  None.    INDICATIONS:    Babar Bhatt is a 56 year old male with a history of low back pain secondary to lumar facet joint arthopathy .  The patient stated that the patient was in their usual state of health and denied recent anticoagulant use or recent infections.  Therefore, the plan is to perform above mentioned procedure.     Procedure Details:    The patient was met in the procedure room, where the patient was identified by name, medical record number and date of birth.  All of the patient s last minute questions were answered. Written informed consent was obtained and saved in the electronic medical record, after the risks, benefits, and alternatives were discussed with the patient.      A formal time-out procedure was performed, as per protocol, including patient name, title of procedure, and site of procedure, and all in the room concurred.  Routine monitors were applied.      The patient was placed in the prone position on the procedure room table.  All pressure points were checked and comfortably padded.  Routine monitors were placed.  Vital signs were stable.    A chlorhexidine prep was completed followed by sterile draping per standard procedure.     We identified each lumbar vertebral body after first identifying the S1 vertebrae.  Fluoroscope was then obliqued towards the patient's right in order to identify the pedicles of the  L4, L5 and sacral ala .  The targets were recognized at the junction of the transverse process and the superior articular process (and sacral ala for L5 medial branch nerves). Skin and subcutaneous tissues overlying this area were anesthetized with a 1% lidocaine. Under fluoroscopic guidance, we directed the 3.5 inch spinal needle through the skin and subcutaneous tissues until osseous contact was achieved.   After a negative aspirate to make sure that there was no intravascular placement, Isovue was then injected to confirm no vascular runoff.   At that point, the stylets were removed 0.5 mL lidocaine was injected. The exact same procedure was repeated  on the left.       Light pressure was held at the puncture site(s) to prevent ecchymosis and oozing.  The patient's skin was cleansed, and hemostasis was confirmed.  Band-aids were applied to the needle injection site(s).      Condition:    The patient tolerated the procedure well and was monitored for approximately 15 minutes afterward in the post procedure area.  There were no immediate post procedure complications noted.  The patient was then discharged to home as per protocol.     Patient condition  stable.    Preprocedure pain score: 8/10  Postprocedure pain score: 0/10

## 2022-07-21 DIAGNOSIS — M47.816 LUMBAR SPONDYLOSIS: Primary | ICD-10-CM

## 2022-07-21 RX ORDER — DIAZEPAM 5 MG
5 TABLET ORAL ONCE
Status: CANCELLED | OUTPATIENT
Start: 2022-07-21 | End: 2022-07-21

## 2022-07-25 ENCOUNTER — DOCUMENTATION ONLY (OUTPATIENT)
Dept: ANESTHESIOLOGY | Facility: CLINIC | Age: 57
End: 2022-07-25

## 2022-07-25 NOTE — PROGRESS NOTES
Purpose of call: Follow up after Bilateral diagnostic lumbar 3, lumbar 4, lumbar 5 medial branch nerve block with a goal of denervating L4-5 and L5-S1 facet joint (first block)  Date of service: 7/14/22  Spoke with: Patient    Location of pain: low back  Percentage of pain relief after procedure: 80 %  Duration of pain relief: 24 hours    Follow up: 2nd block scheduled 8/11/22      Joanne Ortiz RN

## 2022-07-26 ENCOUNTER — TELEPHONE (OUTPATIENT)
Dept: ANESTHESIOLOGY | Facility: CLINIC | Age: 57
End: 2022-07-26

## 2022-07-26 DIAGNOSIS — M47.816 LUMBAR FACET ARTHROPATHY: ICD-10-CM

## 2022-07-26 DIAGNOSIS — G89.29 CHRONIC BILATERAL THORACIC BACK PAIN: ICD-10-CM

## 2022-07-26 DIAGNOSIS — M54.6 CHRONIC BILATERAL THORACIC BACK PAIN: ICD-10-CM

## 2022-07-26 DIAGNOSIS — M47.816 LUMBAR SPONDYLOSIS: Primary | ICD-10-CM

## 2022-07-26 NOTE — TELEPHONE ENCOUNTER
RN called patient's contact, Laura, and left voicemail to discuss pain diary results that were received and to discuss ordering physical therapy which may be needed if patient moves onto RFA with Dr. Lopez. Left call back number to pain clinic to further discuss. RN attempted to contact patient's number listed yesterday and left a voicemail.    Joanne Ortiz RN

## 2022-07-26 NOTE — TELEPHONE ENCOUNTER
RN received incoming call from patient to discuss pain diary results. See documentation only encounter 7/25/22. Patient is scheduled for 2nd block on 8/11 with Dr. Lopez. RN advised patient complete PT in order for approval of RFA If patient proceeds after MBB process, per message received from Dr. Lopez. Patient verbalized frustration with having to do PT, but understands.     Joanne Ortiz RN

## 2022-08-04 DIAGNOSIS — M47.26 OSTEOARTHRITIS OF SPINE WITH RADICULOPATHY, LUMBAR REGION: ICD-10-CM

## 2022-08-04 RX ORDER — GABAPENTIN 300 MG/1
CAPSULE ORAL
Qty: 120 CAPSULE | Refills: 0 | Status: SHIPPED | OUTPATIENT
Start: 2022-08-04 | End: 2022-09-07

## 2022-08-08 ENCOUNTER — THERAPY VISIT (OUTPATIENT)
Dept: PHYSICAL THERAPY | Facility: CLINIC | Age: 57
End: 2022-08-08
Attending: ANESTHESIOLOGY
Payer: COMMERCIAL

## 2022-08-08 ENCOUNTER — LAB (OUTPATIENT)
Dept: LAB | Facility: CLINIC | Age: 57
End: 2022-08-08
Payer: COMMERCIAL

## 2022-08-08 DIAGNOSIS — M54.16 LUMBAR RADICULOPATHY: ICD-10-CM

## 2022-08-08 DIAGNOSIS — M54.6 CHRONIC BILATERAL THORACIC BACK PAIN: ICD-10-CM

## 2022-08-08 DIAGNOSIS — Z20.822 ENCOUNTER FOR LABORATORY TESTING FOR COVID-19 VIRUS: ICD-10-CM

## 2022-08-08 DIAGNOSIS — M47.816 LUMBAR SPONDYLOSIS: ICD-10-CM

## 2022-08-08 DIAGNOSIS — M47.816 LUMBAR FACET ARTHROPATHY: ICD-10-CM

## 2022-08-08 DIAGNOSIS — G89.29 CHRONIC BILATERAL THORACIC BACK PAIN: ICD-10-CM

## 2022-08-08 PROCEDURE — 97110 THERAPEUTIC EXERCISES: CPT | Mod: GP | Performed by: PHYSICAL THERAPIST

## 2022-08-08 PROCEDURE — U0005 INFEC AGEN DETEC AMPLI PROBE: HCPCS | Mod: 90 | Performed by: PATHOLOGY

## 2022-08-08 PROCEDURE — 97161 PT EVAL LOW COMPLEX 20 MIN: CPT | Mod: GP | Performed by: PHYSICAL THERAPIST

## 2022-08-08 PROCEDURE — U0003 INFECTIOUS AGENT DETECTION BY NUCLEIC ACID (DNA OR RNA); SEVERE ACUTE RESPIRATORY SYNDROME CORONAVIRUS 2 (SARS-COV-2) (CORONAVIRUS DISEASE [COVID-19]), AMPLIFIED PROBE TECHNIQUE, MAKING USE OF HIGH THROUGHPUT TECHNOLOGIES AS DESCRIBED BY CMS-2020-01-R: HCPCS | Mod: 90 | Performed by: PATHOLOGY

## 2022-08-08 PROCEDURE — 99000 SPECIMEN HANDLING OFFICE-LAB: CPT | Performed by: PATHOLOGY

## 2022-08-08 NOTE — PROGRESS NOTES
"[facility] Rehabilitation Initial Evaluation    Subjective:  Babar Bhatt is a 56 year old male with complaints of low back pain that started about 5 years ago. He says he does not recall any specific injury. Pt also reports that he has had sciatic pain for the past 10 years and reports pain runs laterally down both thighs, but not below his knees.     Symptoms commenced as a result of: insidious onset. Location of symptoms: across low back, radiates into lateral thighs. Pain level on number scale: 6/10. Quality of pain: achy, sharp, burning; sometimes feels like being tasered. Associated symptoms: denies numbness/tingling in feet; reports occasionally waking up with swelling in lower legs/feet and was recommended to follow-up with PCP. Pain frequency (constant/intermittent): intermittent, \"tight\" after work. Symptoms are exacerbated by: standing. Symptoms are relieved by: heating pad. Progression of symptoms since onset: same. Imaging: see Epic. Previous treatment/response: has had relief with injections and first round of RFA.      General health as reported by patient is good. Pertinent medical history includes: See Epic. Medical allergies: see Epic. Other pertinent surgeries: see Epic. Current medications: See Epic.     Occupation: [employer] ([employer]). Primary job tasks: prolonged standing. Barriers at home/work: None reported by patient.     Social history: joined [employer]; reports gaining 30 pounds over last 2 years      Red flags:  None reported by patient.    Objective  Observation: endomorphic build with increased abdominal tissue    Posture: bilateral rounded shoulders, increased thoracic kyphosis    Gait: wide base of support      Lumbar Movement Response   Flexion 60% to knees, pain in low back   Extension 90%   Side bending/glide L 70%, hinges in mid/upper lumbar   Side bending/glide R 70%, hinges in mid/upper lumbar   Rotation L 60%   Rotation R 60%   Quadrants (if " applicable)      Neurological:    Myotomes L R   L1-2 (hip flexion) 5/5 5/5   L3 (knee extension) 5/5 5/5   L4 (ankle DF) 5/5 5/5   L5 (g. toe ext) 5/5 5/5   S1 (ankle PF or knee flex) 5/5 5/5     Palpation: tenderness palpating central low back    Prone Assessment: hypomobility through thoracic spine    Key Findings  Patient presents with low back pain, hypomobility in lower lumbar with flexion and sidebending; extension preference; radiates to knees bilaterally    Assessment/Plan:    Patient is a 56 year old male with lumbar complaints.    Patient has the following significant findings with corresponding treatment plan.                Diagnosis 1:  Lumbar radiculopathy  Pain -  self management, education, directional preference exercise and home program  Decreased ROM/flexibility - manual therapy, therapeutic exercise and home program    Therapy Evaluation Codes:     Cumulative Therapy Evaluation is: Low complexity.    Previous and current functional limitations:  (See Goal Flow Sheet for this information)    Short term and Long term goals: (See Goal Flow Sheet for this information)     Communication ability:  Patient appears to be able to clearly communicate and understand verbal and written communication and follow directions correctly.  Treatment Explanation - The following has been discussed with the patient:   RX ordered/plan of care  Anticipated outcomes  Possible risks and side effects  This patient would benefit from PT intervention to resume normal activities.   Rehab potential is good.    Frequency:  1 X week, once daily  Duration:  for 8 weeks  Discharge Plan:  Achieve all LTG.  Independent in home treatment program.  Reach maximal therapeutic benefit.    Please refer to the daily flowsheet for treatment today, total treatment time and time spent performing 1:1 timed codes.     Please Contact me with any questions or concerns. Thank you for your time and entrusting me with your care.     Wilbur Cheek,  PT, DPT, OCS, CSMT  Physical Therapist  [facility]  [phone]

## 2022-08-09 LAB — SARS-COV-2 RNA RESP QL NAA+PROBE: NEGATIVE

## 2022-08-09 NOTE — PROGRESS NOTES
[facility]    OUTPATIENT Physical Therapy ORTHOPEDIC EVALUATION  PLAN OF TREATMENT FOR OUTPATIENT REHABILITATION  (COMPLETE FOR INITIAL CLAIMS ONLY)  Patient's Last Name, First Name, M.I.  YOB: 1965  Babar Bhatt    Provider s Name:  [facility]   Medical Record No.  [id number]   Start of Care Date:  08/08/22   Onset Date:   08/08/17   Type:     _X__PT   ___OT Medical Diagnosis:    Encounter Diagnoses   Name Primary?    Lumbar spondylosis     Chronic bilateral thoracic back pain     Lumbar facet arthropathy     Lumbar radiculopathy         Treatment Diagnosis:  low back pain        Goals:     08/08/22 0500   Body Part   Goals listed below are for low back pain   Goal #1   Goal #1 bending   Previous Functional Level Could bend until hands reach ankle   Current Functional Level Can bend until hands reach knee   Performance level 6/10 pain   STG Target Performance Bend until hands reach midlower leg   Performance level <3/10 pain   Rationale for dressing LE;for bathing LE   Due date 08/29/22   LTG Target Performance Bend until hands reach ankle   Performance Level without pain   Rationale for dressing LE;for bathing LE   Due date 10/03/22       Therapy Frequency:  1x/week  Predicted Duration of Therapy Intervention:  8 weeks    Wilbur Cheek, PT                 I CERTIFY THE NEED FOR THESE SERVICES FURNISHED UNDER        THIS PLAN OF TREATMENT AND WHILE UNDER MY CARE     (Physician attestation of this document indicates review and certification of the therapy plan).                     Certification Date From:  08/08/22   Certification Date To:  11/05/22    Referring Provider:  William Lopez    Initial Assessment        See Epic Evaluation SOC Date: 08/08/22

## 2022-08-11 ENCOUNTER — HOSPITAL ENCOUNTER (OUTPATIENT)
Facility: AMBULATORY SURGERY CENTER | Age: 57
Discharge: HOME | End: 2022-08-11
Attending: ANESTHESIOLOGY | Admitting: ANESTHESIOLOGY
Payer: COMMERCIAL

## 2022-08-11 VITALS
HEART RATE: 67 BPM | OXYGEN SATURATION: 98 % | DIASTOLIC BLOOD PRESSURE: 111 MMHG | BODY MASS INDEX: 35.01 KG/M2 | RESPIRATION RATE: 15 BRPM | SYSTOLIC BLOOD PRESSURE: 176 MMHG | WEIGHT: 231 LBS | TEMPERATURE: 97 F | HEIGHT: 68 IN

## 2022-08-11 DIAGNOSIS — E78.5 HYPERLIPIDEMIA WITH TARGET LDL LESS THAN 100: ICD-10-CM

## 2022-08-11 DIAGNOSIS — M47.816 LUMBAR SPONDYLOSIS: ICD-10-CM

## 2022-08-11 DIAGNOSIS — E78.5 HYPERLIPIDEMIA LDL GOAL <130: ICD-10-CM

## 2022-08-11 PROCEDURE — 64494 INJ PARAVERT F JNT L/S 2 LEV: CPT | Mod: 50 | Performed by: ANESTHESIOLOGY

## 2022-08-11 PROCEDURE — 64493 INJ PARAVERT F JNT L/S 1 LEV: CPT | Mod: 50

## 2022-08-11 PROCEDURE — 64493 INJ PARAVERT F JNT L/S 1 LEV: CPT | Mod: 50 | Performed by: ANESTHESIOLOGY

## 2022-08-11 RX ORDER — DIAZEPAM 5 MG
5 TABLET ORAL ONCE
Status: COMPLETED | OUTPATIENT
Start: 2022-08-11 | End: 2022-08-11

## 2022-08-11 RX ORDER — BUPIVACAINE HYDROCHLORIDE 2.5 MG/ML
INJECTION, SOLUTION EPIDURAL; INFILTRATION; INTRACAUDAL PRN
Status: DISCONTINUED | OUTPATIENT
Start: 2022-08-11 | End: 2022-08-11 | Stop reason: HOSPADM

## 2022-08-11 RX ORDER — ROSUVASTATIN CALCIUM 20 MG/1
20 TABLET, COATED ORAL DAILY
Qty: 90 TABLET | Refills: 0 | Status: SHIPPED | OUTPATIENT
Start: 2022-08-11 | End: 2022-11-14

## 2022-08-11 RX ORDER — LIDOCAINE HYDROCHLORIDE 10 MG/ML
INJECTION, SOLUTION EPIDURAL; INFILTRATION; INTRACAUDAL; PERINEURAL PRN
Status: DISCONTINUED | OUTPATIENT
Start: 2022-08-11 | End: 2022-08-11 | Stop reason: HOSPADM

## 2022-08-11 RX ADMIN — DIAZEPAM 5 MG: 5 TABLET ORAL at 13:47

## 2022-08-11 NOTE — TELEPHONE ENCOUNTER
Prescription approved per [facility] Refill Protocol.  Janay GUZMAN RN

## 2022-08-11 NOTE — OP NOTE
Patient: Babar Bhatt Age: 56 year old   MRN: [id number] Attending: Dr. Lopez        PAIN MEDICINE CLINIC PROCEDURE NOTE     ATTENDING CLINICIAN:    William Lopez MD    Fellow: Jorge Major DO     PREPROCEDURE DIAGNOSES:  1. Lumbar facet arthropathy   2. Chronic low back pain      PROCEDURE(S) PERFORMED:  1. Bilateral L3 and L4 medial branch nerve and L5 dorsal ramus nerve blocks to target bilateral L4/5 and L5/S1 facet joints (second block)  2.  Fluoroscopic guidance for the above procedures     ANESTHESIA:  Local.     BLOOD LOSS:  Minimal.     DRAINS AND SPECIMENS:  None.     COMPLICATIONS:  None.     INDICATIONS:     Babar Bhatt is a 56 year old male with a history of low back pain secondary to lumar facet joint arthopathy .  The patient stated that the patient was in their usual state of health and denied recent anticoagulant use or recent infections.  Therefore, the plan is to perform above mentioned procedure.      Procedure Details:     The patient was met in the procedure room, where the patient was identified by name, medical record number and date of birth.  All of the patient s last minute questions were answered. Written informed consent was obtained and saved in the electronic medical record, after the risks, benefits, and alternatives were discussed with the patient.       A formal time-out procedure was performed, as per protocol, including patient name, title of procedure, and site of procedure, and all in the room concurred.  Routine monitors were applied.       The patient was placed in the prone position on the procedure room table.  All pressure points were checked and comfortably padded.  Routine monitors were placed.  Vital signs were stable.     A chlorhexidine prep was completed followed by sterile draping per standard procedure.      We identified each lumbar vertebral body after first identifying the S1 vertebrae.  Fluoroscope was then obliqued towards the patient's right in order  to identify the pedicles of the L4, L5 and sacral ala .  The targets were recognized at the junction of the transverse process and the superior articular process (and sacral ala for L5 medial branch nerves). Skin and subcutaneous tissues overlying this area were anesthetized with a 1% lidocaine. Under fluoroscopic guidance, we directed the 5 inch spinal needle (for all 3 levels) through the skin and subcutaneous tissues until osseous contact was achieved.   After a negative aspirate to make sure that there was no intravascular placement, Isovue was then injected to confirm no vascular runoff.   At that point, the stylets were removed 0.5 mL lidocaine was injected. The exact same procedure was repeated  on the left.         Light pressure was held at the puncture site(s) to prevent ecchymosis and oozing.  The patient's skin was cleansed, and hemostasis was confirmed.  Band-aids were applied to the needle injection site(s).       Condition:     The patient tolerated the procedure well and was monitored for approximately 15 minutes afterward in the post procedure area.  There were no immediate post procedure complications noted.  The patient was then discharged to home as per protocol.      Patient condition  stable.     Preprocedure pain score: 5/10  Postprocedure pain score 3 out of 10

## 2022-08-11 NOTE — DISCHARGE INSTRUCTIONS
Home Care Instructions after a Medial Branch Block      In a medial branch block, a local anesthetic (numbing medicine) is injected near the medial branch nerve. This stops the transmission of pain signals from the facet joint. If this reduces your pain and improves your mobility, it may tell the doctor which facet joint is causing the pain. This procedure is a diagnostic procedure and is typically short lasting. With this injection, a steroid to increase the longevity of the blocks effect may or may not be used.    Activity  -You may resume most normal activity levels with the exception of strenuous activity. It is important for us to know if your pain with normal activity is relieved after this injection.  -DO NOT shower for 24 hours  -DO NOT remove bandaid for 24 hours    Pain  -You may experience soreness at the injection site for one or two days  -You may use an ice pack for 20 minutes every 2 hours for the first 24 hours  -You may use a heating pad after the first 24 hours  -You may use Tylenol (acetaminophen) every 4 hours or other pain medicines as     directed by your physician    You may experience numbness radiating into your legs or arms (depending on the procedure location). This numbness may last several hours. Until sensation returns to normal; please use caution in walking, climbing stairs, and stepping out of your vehicle, etc.    DID YOU RECEIVE STEROIDS TODAY?  {YES / NO:980012}    Common side effects of steroids:  Not everyone will experience corticosteroid side effects. If side effects are experienced, they will gradually subside in the 7-10 day period following an injection. Most common side effects include:  -Flushed face and/or chest  -Feeling of warmth, particularly in the face but could be an overall feeling of warmth  -Increased blood sugar in diabetic patients  -Menstrual irregularities my occur. If taking hormone-based birth control an alternate method of birth control is  recommended  -Sleep disturbances and/or mood swings are possible  -Leg cramps      PLEASE KEEP TRACK OF YOUR SYMPTOMS AND NOTE YOUR IMPROVEMENT FOR YOUR DOCTOR.     Fill out the pain diary and fax it back to us. You do not need to call us if the pain diary was faxed.   If you do not have access to a fax machine, attach it to wywy.  You do not need to call us if the pain diary was attached to wywy.   If you cannot fax or send via wywy, then call our call center and request to speak with a nurse for follow up after a procedure   Please contact us if you have:  -Severe pain  -Fever more than 101.5 degrees Fahrenheit  -Signs of infection at the injection site (redness, swelling, or drainage)    FOR PAIN CENTER PATIENTS:  If you have questions, please contact the Pain Clinic at [phone] Option #1 between the hours of 7:00 am and 3:00 pm Monday through Friday. After office hours you can contact the on call provider by dialing [phone]. If you need immediate attention, we recommend that you go to a hospital emergency room or dial [phone].      FOR PM&R PATIENTS:  For patients seen by the PM and R service, please call [phone]. If you need to fax a pain diary to PM&R the fax number is [phone]. If you are unable to fax, uploading to [employer] is encouraged, then send to provider. If you have procedure scheduling questions please call [phone] Option #2

## 2022-08-12 ENCOUNTER — TELEPHONE (OUTPATIENT)
Dept: FAMILY MEDICINE | Facility: CLINIC | Age: 57
End: 2022-08-12

## 2022-08-12 NOTE — TELEPHONE ENCOUNTER
Central Prior Authorization Team - Phone: [phone]     Prior Authorization Not Needed per Insurance- note from last PA record- [employer] covers Rosuvastatin 40mg taking 1/2 tablet daily. No PA needed.    Medication: rosuvastatin (CRESTOR) 20 MG tablet- PA NOT NEEDED - 40MG COVERED 1/2 DAILY PER INSURANCE  Insurance Company:    Expected CoPay:      Pharmacy Filling the Rx: [id number] - [place] [address] [address]  Pharmacy Notified: YesComment:  yes called and spoke to pharmacist desiree and stated he saw previous note on file about [employer] covers 40mg 1/2 daily and is switching to that. I asked if pharmacy needs a new Rx from provider and he stated no he can change without need new rx dose is the same  Patient Notified: YesComment:  PHARMACY FILLING NOW AND WILL NOTIFY WHEN READY

## 2022-08-12 NOTE — TELEPHONE ENCOUNTER
Prior Authorization Retail Medication Request    Medication/Dose: rosuvastatin (CRESTOR) 20 MG tablet  ICD code (if different than what is on RX):    Hyperlipidemia with target LDL less than 100 [E78.5]       Hyperlipidemia LDL goal <130 [E78.5]       Previously Tried and Failed:  unknown  Rationale:  unknown    Insurance Name:  [employer] [facility]  Insurance ID:  [id number]      Pharmacy Information (if different than what is on RX)  Name:  [facility]  Phone:  [phone]

## 2022-08-17 DIAGNOSIS — M47.816 LUMBAR SPONDYLOSIS: Primary | ICD-10-CM

## 2022-08-17 RX ORDER — SODIUM CHLORIDE 9 MG/ML
500 INJECTION, SOLUTION INTRAVENOUS CONTINUOUS
Status: CANCELLED | OUTPATIENT
Start: 2022-08-17 | End: 2022-08-17

## 2022-08-22 ENCOUNTER — THERAPY VISIT (OUTPATIENT)
Dept: PHYSICAL THERAPY | Facility: CLINIC | Age: 57
End: 2022-08-22
Payer: COMMERCIAL

## 2022-08-22 DIAGNOSIS — M54.16 LUMBAR RADICULOPATHY: Primary | ICD-10-CM

## 2022-08-22 PROCEDURE — 97110 THERAPEUTIC EXERCISES: CPT | Mod: GP | Performed by: PHYSICAL THERAPIST

## 2022-08-23 ENCOUNTER — DOCUMENTATION ONLY (OUTPATIENT)
Dept: ANESTHESIOLOGY | Facility: CLINIC | Age: 57
End: 2022-08-23

## 2022-08-23 NOTE — PROGRESS NOTES
Purpose of call: Follow up after Bilateral lumbar 3, lumbar 4, lumbar 5 diagnostic  medial branch nerve block to target bilateral L3-L4 and L5-S1 facet joints (second block)  Date of service: 8/11/22  Spoke with: Patient    Location of pain: Low back  Percentage of pain relief after procedure: 80-90 %  Duration of pain relief: 1+ day    Follow up: RFA scheduled for 9/1/22      Joanne Ortiz RN

## 2022-08-30 ENCOUNTER — TELEPHONE (OUTPATIENT)
Dept: ANESTHESIOLOGY | Facility: CLINIC | Age: 57
End: 2022-08-30

## 2022-08-30 NOTE — TELEPHONE ENCOUNTER
RN called patient's contact number, no answer and voicemail gives a different patient name. RN called patient's spouse and left a voicemail with detailed information to reschedule patient's procedure appointment on 9/1 to a date further out. Patient's insurance is requesting additional physical therapy visits to meet criteria for the radiofrequency ablation. RN left call back number to discuss further and reschedule patient.     Joanne Ortiz RN

## 2022-09-06 NOTE — TELEPHONE ENCOUNTER
Left message for patient to call [facility] back  When patient calls back please transfer to RN  SASHA Maynard Fardows Omar, MD   7/22/2021  1:23 PM CDT Back to Top      I reviewed the CT scan and MRI.  He was noted to have a three small solid nodules.  Size of the nodules was small.  It ranges from 2 mm to 4 mm.   Findings are also consistent with trace centrilobular emphysema which is consistent with his history of COPD.        I would recommend continuing with annual CT scans to screen for lung cancer.     Also, his MRI shows disc herniation at the level of L5-S1.  Disc herniation is contacting the S1 nerve root.  No signs of spinal stenosis.  I would like to discuss results of the CT and MRI with Mr. Godinez.  Can you please advise him to schedule an office visit or a virtual visit to discuss the results.           Best regards  Nabila Newby MD        26-Aug-2022

## 2022-09-07 DIAGNOSIS — M54.6 CHRONIC BILATERAL THORACIC BACK PAIN: ICD-10-CM

## 2022-09-07 DIAGNOSIS — G89.29 CHRONIC BILATERAL THORACIC BACK PAIN: ICD-10-CM

## 2022-09-08 NOTE — TELEPHONE ENCOUNTER
Medication refilled, no changes. Pt last seen in clinic but Dr. Lopez on 6/30/22.    Yuli Harris LPN

## 2022-09-14 ENCOUNTER — THERAPY VISIT (OUTPATIENT)
Dept: PHYSICAL THERAPY | Facility: CLINIC | Age: 57
End: 2022-09-14
Payer: COMMERCIAL

## 2022-09-14 DIAGNOSIS — M54.16 LUMBAR RADICULOPATHY: Primary | ICD-10-CM

## 2022-09-14 PROCEDURE — 97140 MANUAL THERAPY 1/> REGIONS: CPT | Mod: GP | Performed by: PHYSICAL THERAPIST

## 2022-09-14 PROCEDURE — 97110 THERAPEUTIC EXERCISES: CPT | Mod: GP | Performed by: PHYSICAL THERAPIST

## 2022-09-21 ENCOUNTER — OFFICE VISIT (OUTPATIENT)
Dept: FAMILY MEDICINE | Facility: CLINIC | Age: 57
End: 2022-09-21
Payer: COMMERCIAL

## 2022-09-21 VITALS
SYSTOLIC BLOOD PRESSURE: 180 MMHG | BODY MASS INDEX: 37.03 KG/M2 | HEIGHT: 69 IN | RESPIRATION RATE: 16 BRPM | WEIGHT: 250 LBS | TEMPERATURE: 98.2 F | HEART RATE: 86 BPM | OXYGEN SATURATION: 97 % | DIASTOLIC BLOOD PRESSURE: 94 MMHG

## 2022-09-21 DIAGNOSIS — J43.2 CENTRILOBULAR EMPHYSEMA (H): ICD-10-CM

## 2022-09-21 DIAGNOSIS — E78.5 HYPERLIPIDEMIA WITH TARGET LDL LESS THAN 100: ICD-10-CM

## 2022-09-21 DIAGNOSIS — I10 BENIGN ESSENTIAL HYPERTENSION: Primary | ICD-10-CM

## 2022-09-21 DIAGNOSIS — J44.9 CHRONIC OBSTRUCTIVE PULMONARY DISEASE, UNSPECIFIED COPD TYPE (H): ICD-10-CM

## 2022-09-21 DIAGNOSIS — E66.01 MORBID OBESITY (H): ICD-10-CM

## 2022-09-21 LAB
ERYTHROCYTE [DISTWIDTH] IN BLOOD BY AUTOMATED COUNT: 12.3 % (ref 10–15)
HCT VFR BLD AUTO: 43.1 % (ref 40–53)
HGB BLD-MCNC: 13 G/DL (ref 13.3–17.7)
MCH RBC QN AUTO: 29.5 PG (ref 26.5–33)
MCHC RBC AUTO-ENTMCNC: 30.2 G/DL (ref 31.5–36.5)
MCV RBC AUTO: 98 FL (ref 78–100)
PLATELET # BLD AUTO: 261 10E3/UL (ref 150–450)
RBC # BLD AUTO: 4.4 10E6/UL (ref 4.4–5.9)
WBC # BLD AUTO: 8.9 10E3/UL (ref 4–11)

## 2022-09-21 PROCEDURE — 80053 COMPREHEN METABOLIC PANEL: CPT | Performed by: FAMILY MEDICINE

## 2022-09-21 PROCEDURE — 36415 COLL VENOUS BLD VENIPUNCTURE: CPT | Performed by: FAMILY MEDICINE

## 2022-09-21 PROCEDURE — 85027 COMPLETE CBC AUTOMATED: CPT | Performed by: FAMILY MEDICINE

## 2022-09-21 PROCEDURE — 80061 LIPID PANEL: CPT | Performed by: FAMILY MEDICINE

## 2022-09-21 PROCEDURE — 99214 OFFICE O/P EST MOD 30 MIN: CPT | Performed by: FAMILY MEDICINE

## 2022-09-21 RX ORDER — TRIAMTERENE AND HYDROCHLOROTHIAZIDE 75; 50 MG/1; MG/1
1 TABLET ORAL DAILY
Qty: 90 TABLET | Refills: 0 | Status: SHIPPED | OUTPATIENT
Start: 2022-09-21 | End: 2023-01-09

## 2022-09-21 RX ORDER — ALBUTEROL SULFATE 1.25 MG/3ML
1.25 SOLUTION RESPIRATORY (INHALATION) EVERY 6 HOURS PRN
Qty: 3 ML | Refills: 0 | Status: SHIPPED | OUTPATIENT
Start: 2022-09-21 | End: 2023-01-09

## 2022-09-21 ASSESSMENT — PAIN SCALES - GENERAL: PAINLEVEL: SEVERE PAIN (7)

## 2022-09-21 NOTE — PROGRESS NOTES
DME (Durable Medical Equipment) Orders and Documentation  Orders Placed This Encounter   Procedures     Nebulizer and Supplies Order      The patient was assessed and it was determined the patient is in need of the following listed DME Supplies/Equipment. Please complete supporting documentation below to demonstrate medical necessity.      Nebulizer Documentation  The patient was seen 09/21/2022. After assessment, the patient will need to be treated with ongoing nebulizer for treatment/management of COPD.

## 2022-09-21 NOTE — LETTER
September 22, 2022      Babar LIU Nova  [address] [address]        Dear ,    We are writing to inform you of your test results.    Your test results fall within the expected range(s) or remain unchanged from previous results.  Please continue with current treatment plan.    Resulted Orders   Comprehensive metabolic panel (BMP + Alb, Alk Phos, ALT, AST, Total. Bili, TP)   Result Value Ref Range    Sodium 141 133 - 144 mmol/L    Potassium 4.2 3.4 - 5.3 mmol/L    Chloride 108 94 - 109 mmol/L    Carbon Dioxide (CO2) 27 20 - 32 mmol/L    Anion Gap 6 3 - 14 mmol/L    Urea Nitrogen 17 7 - 30 mg/dL    Creatinine 0.95 0.66 - 1.25 mg/dL    Calcium 8.8 8.5 - 10.1 mg/dL    Glucose 100 (H) 70 - 99 mg/dL    Alkaline Phosphatase 68 40 - 150 U/L    AST 25 0 - 45 U/L    ALT 37 0 - 70 U/L    Protein Total 7.0 6.8 - 8.8 g/dL    Albumin 3.6 3.4 - 5.0 g/dL    Bilirubin Total 0.6 0.2 - 1.3 mg/dL    GFR Estimate >90 >60 mL/min/1.73m2      Comment:      Effective December 21, 2021 eGFRcr in adults is calculated using the 2021 CKD-EPI creatinine equation which includes age and gender (Winsome tuttle al., NEJ, DOI: 10.1056/HORSuv1012792)   Lipid panel reflex to direct LDL Fasting   Result Value Ref Range    Cholesterol 141 <200 mg/dL    Triglycerides 78 <150 mg/dL    Direct Measure HDL 68 >=40 mg/dL    LDL Cholesterol Calculated 57 <=100 mg/dL    Non HDL Cholesterol 73 <130 mg/dL    Patient Fasting > 8hrs? No     Narrative    Cholesterol  Desirable:  <200 mg/dL    Triglycerides  Normal:  Less than 150 mg/dL  Borderline High:  150-199 mg/dL  High:  200-499 mg/dL  Very High:  Greater than or equal to 500 mg/dL    Direct Measure HDL  Female:  Greater than or equal to 50 mg/dL   Male:  Greater than or equal to 40 mg/dL    LDL Cholesterol  Desirable:  <100mg/dL  Above Desirable:  100-129 mg/dL   Borderline High:  130-159 mg/dL   High:  160-189 mg/dL   Very High:  >= 190 mg/dL    Non HDL Cholesterol  Desirable:  130  mg/dL  Above Desirable:  130-159 mg/dL  Borderline High:  160-189 mg/dL  High:  190-219 mg/dL  Very High:  Greater than or equal to 220 mg/dL   CBC with platelets   Result Value Ref Range    WBC Count 8.9 4.0 - 11.0 10e3/uL    RBC Count 4.40 4.40 - 5.90 10e6/uL    Hemoglobin 13.0 (L) 13.3 - 17.7 g/dL    Hematocrit 43.1 40.0 - 53.0 %    MCV 98 78 - 100 fL    MCH 29.5 26.5 - 33.0 pg    MCHC 30.2 (L) 31.5 - 36.5 g/dL    RDW 12.3 10.0 - 15.0 %    Platelet Count 261 150 - 450 10e3/uL       If you have any questions or concerns, please call the clinic at the number listed above.       Sincerely,    Silvina Chang PA-C/cora

## 2022-09-21 NOTE — PROGRESS NOTES
"  Assessment & Plan     Benign essential hypertension  I expressed my concern that his blood pressure is running very high today since he has been off the Maxide.  This is also likely why he is experiencing some mild lower extremity swelling symptoms.  I sent in a refill of this medication and asked him to schedule a physical exam in about 1 month.  We are going to be checking labs today including a CBC, CMP and a cholesterol panel which were ordered by his PCP.  - triamterene-HCTZ (MAXZIDE) 75-50 MG tablet; Take 1 tablet by mouth daily    Morbid obesity (H)  He will continue to work on lifestyle modifications help with weight loss.    Centrilobular emphysema (H)  I ordered nebulizer supplies for him since he reports needing these to be replaced.  - Nebulizer and Supplies Order    Chronic obstructive pulmonary disease, unspecified COPD type (H)  He will continue Symbicort and albuterol as needed.  - albuterol (ACCUNEB) 1.25 MG/3ML neb solution; Take 1 vial (1.25 mg) by nebulization every 6 hours as needed for shortness of breath / dyspnea or wheezing    Hyperlipidemia with target LDL less than 100  Continue rosuvastatin.  We will be checking his cholesterol today.  - Comprehensive metabolic panel (BMP + Alb, Alk Phos, ALT, AST, Total. Bili, TP)  - Lipid panel reflex to direct LDL Fasting  - CBC with platelets             BMI:   Estimated body mass index is 36.92 kg/m  as calculated from the following:    Height as of this encounter: 1.753 m (5' 9\").    Weight as of this encounter: 113.4 kg (250 lb).   Weight management plan: Discussed healthy diet and exercise guidelines        Return in about 4 weeks (around 10/19/2022) for Annual Exam, Blood Pressure Follow Up.    Frederick Leslie DO  [facility] [place]    Margo Eckert is a 56 year old, presenting for the following health issues:  Hypertension and Swelling (Eye lids,feet and lower legs swelling)      History of Present Illness " "      Reason for visit:  Check up    He eats 0-1 servings of fruits and vegetables daily.He exercises with enough effort to increase his heart rate 20 to 29 minutes per day.  He is missing 1 dose(s) of medications per week.       Hypertension Follow-up      Do you check your blood pressure regularly outside of the clinic? No     Are you following a low salt diet?     Are your blood pressures ever more than 140 on the top number (systolic) OR more   than 90 on the bottom number (diastolic), for example 140/90?     Concern - swelling  Onset: 1 month  Description: legs,feet and eye lids swell  Intensity: moderate  Progression of Symptoms:  same  Accompanying Signs & Symptoms:   Previous history of similar problem:   Precipitating factors:        Worsened by:   Alleviating factors:        Improved by:   Therapies tried and outcome:  none       Review of Systems   Constitutional, HEENT, cardiovascular, pulmonary, GI, , musculoskeletal, neuro, skin, endocrine and psych systems are negative, except as otherwise noted.      Objective    BP (!) 180/94   Pulse 86   Temp 98.2  F (36.8  C) (Tympanic)   Resp 16   Ht 1.753 m (5' 9\")   Wt 113.4 kg (250 lb)   SpO2 97%   BMI 36.92 kg/m    Body mass index is 36.92 kg/m .  Physical Exam   GENERAL: healthy, alert and no distress  EYES: Eyes grossly normal to inspection, PERRL and conjunctivae and sclerae normal  NECK: no adenopathy, no asymmetry, masses, or scars and thyroid normal to palpation  RESP: lungs clear to auscultation - no rales, rhonchi or wheezes  CV: regular rate and rhythm, normal S1 S2, no S3 or S4, no murmur, click or rub, no peripheral edema and peripheral pulses strong  ABDOMEN: soft, nontender, no hepatosplenomegaly, no masses and bowel sounds normal  MS: no gross musculoskeletal defects noted, trace lower extremity edema  SKIN: no suspicious lesions or rashes  NEURO: Normal strength and tone, mentation intact and speech normal  PSYCH: mentation appears " normal, affect normal/bright

## 2022-09-22 LAB
ALBUMIN SERPL-MCNC: 3.6 G/DL (ref 3.4–5)
ALP SERPL-CCNC: 68 U/L (ref 40–150)
ALT SERPL W P-5'-P-CCNC: 37 U/L (ref 0–70)
ANION GAP SERPL CALCULATED.3IONS-SCNC: 6 MMOL/L (ref 3–14)
AST SERPL W P-5'-P-CCNC: 25 U/L (ref 0–45)
BILIRUB SERPL-MCNC: 0.6 MG/DL (ref 0.2–1.3)
BUN SERPL-MCNC: 17 MG/DL (ref 7–30)
CALCIUM SERPL-MCNC: 8.8 MG/DL (ref 8.5–10.1)
CHLORIDE BLD-SCNC: 108 MMOL/L (ref 94–109)
CHOLEST SERPL-MCNC: 141 MG/DL
CO2 SERPL-SCNC: 27 MMOL/L (ref 20–32)
CREAT SERPL-MCNC: 0.95 MG/DL (ref 0.66–1.25)
FASTING STATUS PATIENT QL REPORTED: NO
GFR SERPL CREATININE-BSD FRML MDRD: >90 ML/MIN/1.73M2
GLUCOSE BLD-MCNC: 100 MG/DL (ref 70–99)
HDLC SERPL-MCNC: 68 MG/DL
LDLC SERPL CALC-MCNC: 57 MG/DL
NONHDLC SERPL-MCNC: 73 MG/DL
POTASSIUM BLD-SCNC: 4.2 MMOL/L (ref 3.4–5.3)
PROT SERPL-MCNC: 7 G/DL (ref 6.8–8.8)
SODIUM SERPL-SCNC: 141 MMOL/L (ref 133–144)
TRIGL SERPL-MCNC: 78 MG/DL

## 2022-09-23 ENCOUNTER — THERAPY VISIT (OUTPATIENT)
Dept: PHYSICAL THERAPY | Facility: CLINIC | Age: 57
End: 2022-09-23
Payer: COMMERCIAL

## 2022-09-23 DIAGNOSIS — M54.16 LUMBAR RADICULOPATHY: Primary | ICD-10-CM

## 2022-09-23 PROCEDURE — 97110 THERAPEUTIC EXERCISES: CPT | Mod: GP

## 2022-09-28 DIAGNOSIS — J44.9 CHRONIC OBSTRUCTIVE PULMONARY DISEASE, UNSPECIFIED COPD TYPE (H): ICD-10-CM

## 2022-09-28 RX ORDER — BUDESONIDE AND FORMOTEROL FUMARATE DIHYDRATE 160; 4.5 UG/1; UG/1
2 AEROSOL RESPIRATORY (INHALATION) 2 TIMES DAILY
Qty: 10 G | Refills: 2 | Status: SHIPPED | OUTPATIENT
Start: 2022-09-28 | End: 2023-01-03

## 2022-10-11 DIAGNOSIS — M47.26 OSTEOARTHRITIS OF SPINE WITH RADICULOPATHY, LUMBAR REGION: ICD-10-CM

## 2022-10-12 RX ORDER — GABAPENTIN 300 MG/1
CAPSULE ORAL
Qty: 120 CAPSULE | Refills: 0 | Status: SHIPPED | OUTPATIENT
Start: 2022-10-12 | End: 2022-11-14

## 2022-10-17 ENCOUNTER — LAB (OUTPATIENT)
Dept: LAB | Facility: CLINIC | Age: 57
End: 2022-10-17
Payer: COMMERCIAL

## 2022-10-17 DIAGNOSIS — Z20.822 ENCOUNTER FOR LABORATORY TESTING FOR COVID-19 VIRUS: ICD-10-CM

## 2022-10-17 LAB — SARS-COV-2 RNA RESP QL NAA+PROBE: NEGATIVE

## 2022-10-17 PROCEDURE — 99000 SPECIMEN HANDLING OFFICE-LAB: CPT | Performed by: PATHOLOGY

## 2022-10-17 PROCEDURE — U0003 INFECTIOUS AGENT DETECTION BY NUCLEIC ACID (DNA OR RNA); SEVERE ACUTE RESPIRATORY SYNDROME CORONAVIRUS 2 (SARS-COV-2) (CORONAVIRUS DISEASE [COVID-19]), AMPLIFIED PROBE TECHNIQUE, MAKING USE OF HIGH THROUGHPUT TECHNOLOGIES AS DESCRIBED BY CMS-2020-01-R: HCPCS | Mod: 90 | Performed by: PATHOLOGY

## 2022-10-17 PROCEDURE — U0005 INFEC AGEN DETEC AMPLI PROBE: HCPCS | Mod: 90 | Performed by: PATHOLOGY

## 2022-10-19 DIAGNOSIS — J44.9 CHRONIC OBSTRUCTIVE PULMONARY DISEASE, UNSPECIFIED COPD TYPE (H): ICD-10-CM

## 2022-10-20 ENCOUNTER — HOSPITAL ENCOUNTER (OUTPATIENT)
Facility: AMBULATORY SURGERY CENTER | Age: 57
Discharge: HOME | End: 2022-10-20
Attending: ANESTHESIOLOGY | Admitting: ANESTHESIOLOGY
Payer: COMMERCIAL

## 2022-10-20 ENCOUNTER — ANCILLARY PROCEDURE (OUTPATIENT)
Dept: RADIOLOGY | Facility: AMBULATORY SURGERY CENTER | Age: 57
End: 2022-10-20
Attending: ANESTHESIOLOGY
Payer: COMMERCIAL

## 2022-10-20 VITALS
RESPIRATION RATE: 16 BRPM | BODY MASS INDEX: 38.4 KG/M2 | WEIGHT: 260 LBS | TEMPERATURE: 97 F | HEART RATE: 90 BPM | DIASTOLIC BLOOD PRESSURE: 103 MMHG | SYSTOLIC BLOOD PRESSURE: 171 MMHG | OXYGEN SATURATION: 95 %

## 2022-10-20 DIAGNOSIS — M47.816 LUMBAR SPONDYLOSIS: ICD-10-CM

## 2022-10-20 PROCEDURE — 64635 DESTROY LUMB/SAC FACET JNT: CPT | Mod: RT

## 2022-10-20 PROCEDURE — 64636 DESTROY L/S FACET JNT ADDL: CPT | Mod: RT | Performed by: ANESTHESIOLOGY

## 2022-10-20 PROCEDURE — 64635 DESTROY LUMB/SAC FACET JNT: CPT | Mod: RT | Performed by: ANESTHESIOLOGY

## 2022-10-20 PROCEDURE — 99152 MOD SED SAME PHYS/QHP 5/>YRS: CPT | Mod: GC | Performed by: ANESTHESIOLOGY

## 2022-10-20 RX ORDER — FENTANYL CITRATE 50 UG/ML
INJECTION, SOLUTION INTRAMUSCULAR; INTRAVENOUS DAILY PRN
Status: DISCONTINUED | OUTPATIENT
Start: 2022-10-20 | End: 2022-10-20 | Stop reason: HOSPADM

## 2022-10-20 RX ORDER — LIDOCAINE HYDROCHLORIDE 10 MG/ML
INJECTION, SOLUTION EPIDURAL; INFILTRATION; INTRACAUDAL; PERINEURAL DAILY PRN
Status: DISCONTINUED | OUTPATIENT
Start: 2022-10-20 | End: 2022-10-20 | Stop reason: HOSPADM

## 2022-10-20 RX ORDER — SODIUM CHLORIDE 9 MG/ML
500 INJECTION, SOLUTION INTRAVENOUS CONTINUOUS
Status: ACTIVE | OUTPATIENT
Start: 2022-10-20 | End: 2022-10-20

## 2022-10-20 RX ORDER — ALBUTEROL SULFATE 90 UG/1
2 AEROSOL, METERED RESPIRATORY (INHALATION) EVERY 6 HOURS
Qty: 6.7 G | Refills: 0 | Status: SHIPPED | OUTPATIENT
Start: 2022-10-20 | End: 2022-12-15

## 2022-10-20 RX ORDER — TRIAMCINOLONE ACETONIDE 40 MG/ML
INJECTION, SUSPENSION INTRA-ARTICULAR; INTRAMUSCULAR DAILY PRN
Status: DISCONTINUED | OUTPATIENT
Start: 2022-10-20 | End: 2022-10-20 | Stop reason: HOSPADM

## 2022-10-20 NOTE — OP NOTE
Patient: Babar Bhatt Age: 56 year old   MRN: [id number] Attending: Dr. Lopez     Date of Visit: October 20, 2022      PAIN MEDICINE CLINIC PROCEDURE NOTE    ATTENDING CLINICIAN:    William Lopez MD    ASSISTANT CLINICIAN:  Mark Hercules MD    PREPROCEDURE DIAGNOSES:  1. Lumbar facet arthropathy   2. Chronic low back pain       PROCEDURE(S) PERFORMED:  1. Right L3 and L4 medial branch nerve and L5 dorsal ramus nerve radiofrequency ablation.  2.  Fluoroscopic guidance for the above procedures    ANESTHESIA:  Local.    BLOOD LOSS:  Minimal.    DRAINS AND SPECIMENS:  None.    COMPLICATIONS:  None.    INDICATIONS:    Babar Bhatt is a 56 year old male with a history of low back pain secondary to lumar facet joint arthopathy . The patient previously had good response to diagnostic block of medial branch nerves x2. The patient stated that the patient was in their usual state of health and denied recent anticoagulant use or recent infections.  Therefore, the plan is to perform above mentioned procedure.     Procedure Details:    The patient was met in the procedure room, where the patient was identified by name, medical record number and date of birth.  All of the patient s last minute questions were answered. Written informed consent was obtained and saved in the electronic medical record, after the risks, benefits, and alternatives were discussed with the patient.      A formal time-out procedure was performed by Dr. Lopez, as per protocol, including patient name, title of procedure, and site of procedure, and all in the room concurred.  Routine monitors were applied.      The patient was placed in the prone position on the procedure room table.  All pressure points were checked and comfortably padded.  Routine monitors were placed.  Vital signs were stable.    A chlorhexidine prep was completed followed by sterile draping per standard procedure.     We identified each lumbar vertebral body after first identifying  the S1 vertebrae.  Fluoroscope was then obliqued towards the patient's  in order to identify the pedicles of the  L4, L5 and sacral ala.  The targets were recognized at the junction of the transverse process and the superior articular process (and sacral ala for L5 medial branch). Skin and subcutaneous tissues overlying this area were anesthetized with a 1% lidocaine.  Starting at L4,  under fluoroscopic guidance, we directed the radiofrequency 150 millimeter, 22-gauge RFA needle through the skin and subcutaneous tissues  until osseous contact was achieved.  At this point, the needle was walked off the junction of the transverse process and the superior articular process.The procedure  was then repeated for the L5 and sacral ala on the right. Lateral fluoroscopic image was obtained to check the correct needle depth.      At that point, the stylet was removed from the RF needle and an RFA probe was then inserted. Sensory testing was appropriately responsive.  Motor testing was initiated with appropriate multifidus responses.  There was no motor response in her lower extremities. Ablation was then carried out at 80 degrees Celsius for 90 seconds. Then RFA needle is rotated to 180 degree a ablation was then commenced at the above settings for a second lesion. Once the RF lesion was completed, 1 cubic centimeter of a solution consisting of 1 cubic centimeter of 40 mg per cubic centimeters triamcinolone and 7 cubic centimeters of 0.25% bupivacaine was injected through each RFA needle for a total of 1 cubic centimeters at each level.      Light pressure was held at the puncture site(s) to prevent ecchymosis and oozing.  The patient's skin was cleansed, and hemostasis was confirmed.  Band-aids were applied to the needle injection site(s).      Condition:    The patient tolerated the procedure well and was monitored for approximately 15 minutes afterward in the post procedure area.  There were no immediate post procedure  complications noted.  The patient was then discharged to home as per protocol.     Patient condition  Stable.    Pre-procedure pain score: 8/10  Post-procedure pain score: 0/10        Sedation:      Performed by: William Lopez MD    Indication:  Sedation is required to allow above mentioned procedure     Consent:  Consent obtained from the patient Babar Bhatt after discussing the risks, benefits and alternatives.    PO Intake:  Appropriately NPO for procedure    Lungs: Lungs Clear with good breath sounds bilaterally.     Heart: Normal heart sounds and rate    Focused history and physical completed prior to procedure. I have reviewed the lab findings, diagnostic data, medications, and the plan for sedation. I have determined this patient to be an appropriate candidate for the planned sedation and procedure and have reassessed the patient IMMEDIATELY PRIOR to sedation and procedure.      Sedation Post Procedure Summary:    Prior to the start of the procedure and with procedural staff participation, I verbally confirmed the patient s identity using two indicators, relevant allergies, that the procedure was appropriate and matched the consent or emergent situation, and that the correct equipment/implants were available. Immediately prior to starting the procedure I conducted the Time Out with the procedural staff and re-confirmed the patient s name, procedure, and site/side. (The Joint Commission universal protocol was followed.)  Yes      Sedatives: Fentanyl and Midazolam (Versed)    Vital signs, airway, End Tidal CO2 and pulse oximetry were monitored and remained stable throughout the procedure and sedation was maintained until the procedure was complete.  The patient was monitored by staff until sedation discharge criteria were met.    Patient tolerance: Patient tolerated the procedure well with no immediate complications.    Time of sedation in minutes: 35 minutes from beginning to end of physician one to one  monitoring.

## 2022-10-20 NOTE — DISCHARGE INSTRUCTIONS
Home Care Instructions after a Radio Frequency Ablation      Activity  -Rest today  -Do not work today  -Resume normal activity in 2-3 days  -No heavy lifting, turning or twisting for 24 hours  -DO NOT shower or soak in tub for 24 hours  -DO NOT remove bandaid for 24 hours    Pain  -You may experience soreness at the injection site for one or two days  -You may use an ice pack for 20 minutes every 2 hours for the first 24 hours, longer if needed for comfort, do not use heat  -You may use Tylenol (acetaminophen) every 4 hours or other pain medicines as directed by your physician  -If other pain medications are prescribed by your physician, please follow dosing instructions carefully.    What to expect  You may experience numbness radiating into your legs or arms (depending on the procedure location). This numbness may last several hours. Until sensation returns to normal, please use caution in walking, climbing stairs, and stepping out of your vehicle, etc. Relief of your initial symptoms can take up to 4 weeks to feel better, this is normal and due to the healing process. The procedure site may feel like a deep burn. Using ice will greatly minimize this discomfort. Do not use numbing creams or patches over your injection sites immediately following your procedure. Please keep injection sites covered, clean and dry for 24 hours.    DID YOU RECEIVE SEDATION TODAY?  Yes    Safety  Sedation medicine, if given, may remain active for many hours. It is important for the next 24 hours that you do not:  -Drive a car  -Operate machines or power tools  -Consume alcohol, including beer  -Sign any important papers or legal documents  -Please have a responsible adult with you for 24 hours following any sedation      Common side effects of steroids:  Not everyone will experience corticosteroid side effects. If side effects are experienced, they will gradually subside in the 7-10 day period following an injection. Most common side  effects include:  -Flushed face and/or chest  -Feeling of warmth, particularly in the face but could be an overall feeling of warmth  -Increased blood sugar in diabetic patients  -Menstrual irregularities my occur. If taking hormone-based birth control an alternate method of birth control is recommended  -Sleep disturbances and/or mood swings are possible  -Leg cramps      Please contact us if you have:  -Severe pain  -Fever more than 101.5 degrees Fahrenheit  -Signs of infection at the injection site (redness, swelling, or drainage)    If you have questions, please contact our office at [phone] Option #1 between the hours of 7:00 am and 3:00 pm Monday through Friday. After office hours you can contact the on call provider by dialing [phone]. If you need immediate attention, we recommend that you go to a hospital emergency room or dial [phone].    If you have procedure scheduling questions please call [phone] Option #2    If you are a PM&R patient, please call [phone] for questions

## 2022-10-20 NOTE — OR NURSING
"Patient requesting to leave shortly after done with RFA procedure. RN advised him he needed to stay and let us observe him post-sedation, but he became agitated and restless. Stated he \"had places he needed to go,\" and that he was not going to stay in his recovery room. Advised him that he could not leave until his  arrived per our policy. At this point he became argumentative and staff was concerned he would become aggressive. He then walked out of the room and left the floor AMA. Dr Lopez notified.  "

## 2022-10-20 NOTE — TELEPHONE ENCOUNTER
One time Rx sent 9/22  Note from 9/21/22 OV:    Return in about 4 weeks (around 10/19/2022) for Annual Exam, Blood Pressure Follow Up.    Elma Chou RN

## 2022-11-11 DIAGNOSIS — E78.5 HYPERLIPIDEMIA LDL GOAL <130: ICD-10-CM

## 2022-11-11 DIAGNOSIS — M47.26 OSTEOARTHRITIS OF SPINE WITH RADICULOPATHY, LUMBAR REGION: ICD-10-CM

## 2022-11-11 DIAGNOSIS — E78.5 HYPERLIPIDEMIA WITH TARGET LDL LESS THAN 100: ICD-10-CM

## 2022-11-11 NOTE — TELEPHONE ENCOUNTER
"Requested Prescriptions   Pending Prescriptions Disp Refills     rosuvastatin (CRESTOR) 20 MG tablet 90 tablet 0     Sig: Take 1 tablet (20 mg) by mouth daily       [facility] Protocol Passed - 11/11/2022  2:08 PM        Passed - LDL on file in past 12 months     Recent Labs   Lab Test 09/21/22  1408   LDL 57             Passed - No abnormal creatine kinase in past 12 months     No lab results found.             Passed - Recent (12 mo) or future (30 days) visit within the authorizing provider's specialty     Patient has had an office visit with the authorizing provider or a provider within the authorizing providers department within the previous 12 mos or has a future within next 30 days. See \"Patient Info\" tab in inbasket, or \"Choose Columns\" in Meds & Orders section of the refill encounter.              Passed - Medication is active on med list        Passed - Patient is age 18 or older            "

## 2022-11-14 RX ORDER — ROSUVASTATIN CALCIUM 20 MG/1
20 TABLET, COATED ORAL DAILY
Qty: 30 TABLET | Refills: 0 | Status: SHIPPED | OUTPATIENT
Start: 2022-11-14 | End: 2022-11-18

## 2022-11-14 RX ORDER — GABAPENTIN 300 MG/1
CAPSULE ORAL
Qty: 120 CAPSULE | Refills: 0 | Status: SHIPPED | OUTPATIENT
Start: 2022-11-14 | End: 2022-12-14

## 2022-11-14 NOTE — TELEPHONE ENCOUNTER
Patient called back.   Scheduled physical w/ FS on 12/5/22.   Would like medication refilled.   Thanks!  Renetta RIVERA

## 2022-11-14 NOTE — TELEPHONE ENCOUNTER
Left non detailed VM for pt asking that they callback and schedule fasting physical  Janay GUZMAN RN

## 2022-11-14 NOTE — TELEPHONE ENCOUNTER
Crestor:  Prescription approved per [facility] Refill Protocol.    Janay GUZMAN RN

## 2022-11-15 ENCOUNTER — TELEPHONE (OUTPATIENT)
Dept: FAMILY MEDICINE | Facility: CLINIC | Age: 57
End: 2022-11-15

## 2022-11-15 DIAGNOSIS — E78.5 HYPERLIPIDEMIA LDL GOAL <100: ICD-10-CM

## 2022-11-15 DIAGNOSIS — E66.01 MORBID OBESITY (H): Primary | ICD-10-CM

## 2022-11-15 NOTE — TELEPHONE ENCOUNTER
Prior Authorization Retail Medication Request    Medication/Dose: rosuvastatin (CRESTOR) 20 MG tablet  ICD code (if different than what is on RX):    Hyperlipidemia with target LDL less than 100 [E78.5]       Hyperlipidemia LDL goal <130 [E78.5]         Previously Tried and Failed:  unknown  Rationale:  unknown    Insurance Name:  [facility] [place] 2]  Insurance ID:  [id number]      Pharmacy Information (if different than what is on RX)  Name:  [facility]  Phone:  [phone]

## 2022-11-16 NOTE — TELEPHONE ENCOUNTER
Allyson from [facility] called and stated that a PA is not needed. Insurance covers rosuvastatin 40mg taking one-half tablet daily. Pharmacy will need a new script.

## 2022-11-16 NOTE — TELEPHONE ENCOUNTER
Central Prior Authorization Team   Phone: [phone]    PA Initiation    Medication: rosuvastatin (CRESTOR) 20 MG tablet  Insurance Company: [employer] - Phone [phone] Fax [phone]  Pharmacy Filling the Rx: [id number] [place], [place] - [address] [facility] [address]  Filling Pharmacy Phone: [phone]  Filling Pharmacy Fax:    Start Date: 11/16/2022

## 2022-11-17 NOTE — PROGRESS NOTES
Discharge Note    Progress reporting period is from initial evaluation date (please see noted date below) to Sep 23, 2022.  Linked Episodes   Type: Episode: Status: Noted: Resolved: Last update: Updated by:   PHYSICAL THERAPY low back pain 8/8/22 Active 8/8/2022 9/23/2022  8:01 AM Wilbur Cheek, PT      Comments:       Babar failed to follow up and current status is unknown.  Please see information below for last relevant information on current status.  Patient seen for 4 visits.    SUBJECTIVE  Subjective changes noted by patient:  Back has been up and down since the last visit. Back feels about the same since starting PT. Plans to get procedure done now that he has completed 4 PT visits.  .  Current pain level is  .     Previous pain level was  6/10.   Changes in function:  Yes (See Goal flowsheet attached for changes in current functional level)  Adverse reaction to treatment or activity: None    OBJECTIVE  Changes noted in objective findings: Lumbar ROM flexion hands to distal shin - relieves pain, extension min loss NE, sidebending WNL - pain w/ R SB. No TTP lumbar paraspinals, QL, PSIS. Encouraged pt to set up more PT after he gets the procedure to help progress strengthening and prevention of future increased back pain.     ASSESSMENT/PLAN  Diagnosis: low back pain   Updated problem list and treatment plan:   Pain - HEP  Decreased ROM/flexibility - HEP  Decreased function - HEP  Decreased strength - HEP  STG/LTGs have been met or progress has been made towards goals:  Yes, please see goal flowsheet for most current information  Assessment of Progress: current status is unknown.    Last current status: Pt is progressing as expected   Self Management Plans:  HEP  I have re-evaluated this patient and find that the nature, scope, duration and intensity of the therapy is appropriate for the medical condition of the patient.  Babar continues to require the following intervention to meet STG and LTG's:   HEP.    Recommendations:  Discharge with current home program.  Patient to follow up with MD as needed.    Please refer to the daily flowsheet for treatment today, total treatment time and time spent performing 1:1 timed codes.

## 2022-11-18 RX ORDER — ROSUVASTATIN CALCIUM 40 MG/1
20 TABLET, COATED ORAL DAILY
Qty: 90 TABLET | Refills: 0 | Status: SHIPPED | OUTPATIENT
Start: 2022-11-18 | End: 2023-01-09

## 2022-11-22 ENCOUNTER — TELEPHONE (OUTPATIENT)
Dept: ANESTHESIOLOGY | Facility: CLINIC | Age: 57
End: 2022-11-22

## 2022-11-22 NOTE — TELEPHONE ENCOUNTER
[facility] Call Center    Phone Message    May a detailed message be left on voicemail: yes     Reason for Call: Other: Patient called stating he is still in a lot of pain, he stated his procedure did not seem to help at all. Patient stated he feels like he is in worse pain now and would like a call back to discuss.     Action Taken: Message routed to:  [facility] ([facility]): Pain    Travel Screening: Not Applicable

## 2022-11-23 DIAGNOSIS — G89.18 PAIN ASSOCIATED WITH SURGICAL PROCEDURE: Primary | ICD-10-CM

## 2022-11-23 RX ORDER — TRAMADOL HYDROCHLORIDE 50 MG/1
50 TABLET ORAL DAILY PRN
Qty: 10 TABLET | Refills: 0 | Status: SHIPPED | OUTPATIENT
Start: 2022-11-23 | End: 2022-12-23

## 2022-11-23 NOTE — TELEPHONE ENCOUNTER
Patient updated that one time prescription of Tramadol was sent to his pharmacy to help with post procedure pain. Patient verbalized understanding. Writer scheduled patient for an appointment with Dr. Lopez in January. Date and time confirmed.       Joanne Ortiz RN

## 2022-11-23 NOTE — TELEPHONE ENCOUNTER
RN returned call and left a voicemail to address patient's concerns post procedure. Left call back number [phone] to discuss.     Joanne Ortiz RN

## 2022-11-23 NOTE — TELEPHONE ENCOUNTER
RN returned call and spoke with patient. Patient has concerns regarding his pain after Radiofrequency Ablation performed on 10/20/22. Patient states since the procedure his pain has felt worsened, 10/10 pain most days, describes as tight stiff feeling in his hips and sides and worsens when patient is moving around trying to work. The first week after the procedure he reports very minimal pain at that time but has since worsened. Patient is taking Gabapentin and Tizanidine which he reports the muscle relaxer really helps but only takes at night time. Writer advised patient that the RFA may take up to 8 weeks to reach full benefit and advised use of Tylenol or Ibuprofen, if patient is able to take, and alternating ice/heat. Patient verbalized understanding and states he will try this. Patient looking for additional recommendations or medication to help in the mean time, as his pain is making it very difficult for him to work. Writer updated patient will route to Dr. Lopez for an update and any additional recommendations.    Joanne Ortiz RN

## 2022-12-13 DIAGNOSIS — M47.26 OSTEOARTHRITIS OF SPINE WITH RADICULOPATHY, LUMBAR REGION: ICD-10-CM

## 2022-12-13 NOTE — TELEPHONE ENCOUNTER
Routing refill request to provider for review/approval because:  Drug not on the [facility] refill protocol   Scheduled for OV 01/09/2022  Jovana WALLIS RN

## 2022-12-14 RX ORDER — GABAPENTIN 300 MG/1
CAPSULE ORAL
Qty: 120 CAPSULE | Refills: 0 | Status: SHIPPED | OUTPATIENT
Start: 2022-12-14 | End: 2023-01-09

## 2023-01-01 ENCOUNTER — PRE VISIT (OUTPATIENT)
Dept: PULMONOLOGY | Facility: CLINIC | Age: 58
End: 2023-01-01

## 2023-01-01 ENCOUNTER — TELEPHONE (OUTPATIENT)
Dept: ANESTHESIOLOGY | Facility: CLINIC | Age: 58
End: 2023-01-01
Payer: COMMERCIAL

## 2023-01-01 ENCOUNTER — TELEPHONE (OUTPATIENT)
Dept: ANESTHESIOLOGY | Facility: CLINIC | Age: 58
End: 2023-01-01

## 2023-01-01 ENCOUNTER — ANCILLARY PROCEDURE (OUTPATIENT)
Dept: GENERAL RADIOLOGY | Facility: CLINIC | Age: 58
End: 2023-01-01
Payer: COMMERCIAL

## 2023-01-01 ENCOUNTER — OFFICE VISIT (OUTPATIENT)
Dept: PULMONOLOGY | Facility: CLINIC | Age: 58
End: 2023-01-01
Payer: COMMERCIAL

## 2023-01-01 ENCOUNTER — TELEPHONE (OUTPATIENT)
Dept: FAMILY MEDICINE | Facility: CLINIC | Age: 58
End: 2023-01-01
Payer: COMMERCIAL

## 2023-01-01 ENCOUNTER — TELEPHONE (OUTPATIENT)
Dept: GASTROENTEROLOGY | Facility: CLINIC | Age: 58
End: 2023-01-01
Payer: COMMERCIAL

## 2023-01-01 ENCOUNTER — OFFICE VISIT (OUTPATIENT)
Dept: FAMILY MEDICINE | Facility: CLINIC | Age: 58
End: 2023-01-01
Payer: COMMERCIAL

## 2023-01-01 ENCOUNTER — HOSPITAL ENCOUNTER (OUTPATIENT)
Facility: AMBULATORY SURGERY CENTER | Age: 58
Discharge: HOME | End: 2023-03-09
Attending: ANESTHESIOLOGY | Admitting: ANESTHESIOLOGY
Payer: COMMERCIAL

## 2023-01-01 ENCOUNTER — TELEPHONE (OUTPATIENT)
Dept: PULMONOLOGY | Facility: CLINIC | Age: 58
End: 2023-01-01
Payer: COMMERCIAL

## 2023-01-01 ENCOUNTER — ANCILLARY PROCEDURE (OUTPATIENT)
Dept: RADIOLOGY | Facility: AMBULATORY SURGERY CENTER | Age: 58
End: 2023-01-01
Attending: ANESTHESIOLOGY
Payer: COMMERCIAL

## 2023-01-01 ENCOUNTER — TELEPHONE (OUTPATIENT)
Dept: FAMILY MEDICINE | Facility: CLINIC | Age: 58
End: 2023-01-01

## 2023-01-01 ENCOUNTER — TELEPHONE (OUTPATIENT)
Facility: CLINIC | Age: 58
End: 2023-01-01
Payer: COMMERCIAL

## 2023-01-01 ENCOUNTER — TELEPHONE (OUTPATIENT)
Dept: GENERAL RADIOLOGY | Facility: CLINIC | Age: 58
End: 2023-01-01
Payer: COMMERCIAL

## 2023-01-01 ENCOUNTER — VIRTUAL VISIT (OUTPATIENT)
Dept: FAMILY MEDICINE | Facility: CLINIC | Age: 58
End: 2023-01-01
Payer: COMMERCIAL

## 2023-01-01 ENCOUNTER — HOSPITAL ENCOUNTER (OUTPATIENT)
Facility: AMBULATORY SURGERY CENTER | Age: 58
Discharge: HOME | End: 2023-08-31
Attending: ANESTHESIOLOGY | Admitting: ANESTHESIOLOGY
Payer: COMMERCIAL

## 2023-01-01 ENCOUNTER — OFFICE VISIT (OUTPATIENT)
Dept: PULMONOLOGY | Facility: CLINIC | Age: 58
End: 2023-01-01
Attending: INTERNAL MEDICINE
Payer: COMMERCIAL

## 2023-01-01 VITALS
BODY MASS INDEX: 35.55 KG/M2 | HEIGHT: 69 IN | HEART RATE: 85 BPM | SYSTOLIC BLOOD PRESSURE: 143 MMHG | DIASTOLIC BLOOD PRESSURE: 89 MMHG | RESPIRATION RATE: 16 BRPM | WEIGHT: 240 LBS | TEMPERATURE: 97.3 F | OXYGEN SATURATION: 96 %

## 2023-01-01 VITALS
OXYGEN SATURATION: 92 % | HEIGHT: 69 IN | HEART RATE: 97 BPM | DIASTOLIC BLOOD PRESSURE: 76 MMHG | RESPIRATION RATE: 14 BRPM | BODY MASS INDEX: 37.03 KG/M2 | TEMPERATURE: 97.2 F | WEIGHT: 250 LBS | SYSTOLIC BLOOD PRESSURE: 124 MMHG

## 2023-01-01 VITALS
OXYGEN SATURATION: 94 % | BODY MASS INDEX: 36.58 KG/M2 | DIASTOLIC BLOOD PRESSURE: 89 MMHG | RESPIRATION RATE: 17 BRPM | HEIGHT: 69 IN | HEART RATE: 90 BPM | WEIGHT: 247 LBS | SYSTOLIC BLOOD PRESSURE: 143 MMHG

## 2023-01-01 VITALS
TEMPERATURE: 98.7 F | SYSTOLIC BLOOD PRESSURE: 147 MMHG | HEART RATE: 89 BPM | RESPIRATION RATE: 16 BRPM | OXYGEN SATURATION: 96 % | DIASTOLIC BLOOD PRESSURE: 96 MMHG

## 2023-01-01 DIAGNOSIS — G47.00 INSOMNIA, UNSPECIFIED TYPE: ICD-10-CM

## 2023-01-01 DIAGNOSIS — F41.9 ANXIETY: ICD-10-CM

## 2023-01-01 DIAGNOSIS — J44.9 CHRONIC OBSTRUCTIVE PULMONARY DISEASE, UNSPECIFIED COPD TYPE (H): ICD-10-CM

## 2023-01-01 DIAGNOSIS — J44.9 CHRONIC OBSTRUCTIVE PULMONARY DISEASE, UNSPECIFIED COPD TYPE (H): Primary | ICD-10-CM

## 2023-01-01 DIAGNOSIS — M54.16 LUMBAR RADICULOPATHY: ICD-10-CM

## 2023-01-01 DIAGNOSIS — I10 BENIGN ESSENTIAL HYPERTENSION: ICD-10-CM

## 2023-01-01 DIAGNOSIS — E78.5 HYPERLIPIDEMIA LDL GOAL <100: ICD-10-CM

## 2023-01-01 DIAGNOSIS — G89.29 CHRONIC BILATERAL THORACIC BACK PAIN: ICD-10-CM

## 2023-01-01 DIAGNOSIS — M54.6 CHRONIC BILATERAL THORACIC BACK PAIN: ICD-10-CM

## 2023-01-01 DIAGNOSIS — E66.01 MORBID OBESITY (H): ICD-10-CM

## 2023-01-01 DIAGNOSIS — M47.26 OSTEOARTHRITIS OF SPINE WITH RADICULOPATHY, LUMBAR REGION: ICD-10-CM

## 2023-01-01 DIAGNOSIS — M54.16 LUMBAR RADICULOPATHY: Primary | ICD-10-CM

## 2023-01-01 DIAGNOSIS — G89.4 CHRONIC PAIN SYNDROME: ICD-10-CM

## 2023-01-01 DIAGNOSIS — R52 PAIN: ICD-10-CM

## 2023-01-01 DIAGNOSIS — K21.9 GASTROESOPHAGEAL REFLUX DISEASE, UNSPECIFIED WHETHER ESOPHAGITIS PRESENT: ICD-10-CM

## 2023-01-01 DIAGNOSIS — R91.8 PULMONARY NODULES: Primary | ICD-10-CM

## 2023-01-01 LAB
DLCOUNC-%PRED-PRE: 92 %
DLCOUNC-PRE: 25.05 ML/MIN/MMHG
DLCOUNC-PRED: 27.23 ML/MIN/MMHG
ERV-%PRED-PRE: 15 %
ERV-PRE: 0.24 L
ERV-PRED: 1.55 L
EXPTIME-PRE: 7.79 SEC
FEF2575-%PRED-PRE: 22 %
FEF2575-PRE: 0.65 L/SEC
FEF2575-PRED: 2.84 L/SEC
FEFMAX-%PRED-PRE: 41 %
FEFMAX-PRE: 3.81 L/SEC
FEFMAX-PRED: 9.14 L/SEC
FEV1-%PRED-PRE: 42 %
FEV1-PRE: 1.42 L
FEV1FEV6-PRE: 58 %
FEV1FEV6-PRED: 79 %
FEV1FVC-PRE: 56 %
FEV1FVC-PRED: 79 %
FEV1SVC-PRE: 43 %
FEV1SVC-PRED: 74 %
FIFMAX-PRE: 5.47 L/SEC
FRCPLETH-%PRED-PRE: 115 %
FRCPLETH-PRE: 4.1 L
FRCPLETH-PRED: 3.56 L
FVC-%PRED-PRE: 60 %
FVC-PRE: 2.55 L
FVC-PRED: 4.21 L
IC-%PRED-PRE: 97 %
IC-PRE: 3.03 L
IC-PRED: 3.09 L
RVPLETH-%PRED-PRE: 162 %
RVPLETH-PRE: 3.85 L
RVPLETH-PRED: 2.38 L
TLCPLETH-%PRED-PRE: 101 %
TLCPLETH-PRE: 7.12 L
TLCPLETH-PRED: 6.99 L
VA-%PRED-PRE: 80 %
VA-PRE: 5.14 L
VC-%PRED-PRE: 73 %
VC-PRE: 3.27 L
VC-PRED: 4.44 L

## 2023-01-01 PROCEDURE — 99442 PR PHYSICIAN TELEPHONE EVALUATION 11-20 MIN: CPT | Mod: 93 | Performed by: FAMILY MEDICINE

## 2023-01-01 PROCEDURE — 62323 NJX INTERLAMINAR LMBR/SAC: CPT

## 2023-01-01 PROCEDURE — 94726 PLETHYSMOGRAPHY LUNG VOLUMES: CPT | Performed by: INTERNAL MEDICINE

## 2023-01-01 PROCEDURE — 94729 DIFFUSING CAPACITY: CPT | Performed by: INTERNAL MEDICINE

## 2023-01-01 PROCEDURE — 94375 RESPIRATORY FLOW VOLUME LOOP: CPT | Performed by: INTERNAL MEDICINE

## 2023-01-01 PROCEDURE — 99205 OFFICE O/P NEW HI 60 MIN: CPT | Mod: 25 | Performed by: INTERNAL MEDICINE

## 2023-01-01 PROCEDURE — 62323 NJX INTERLAMINAR LMBR/SAC: CPT | Mod: GC | Performed by: ANESTHESIOLOGY

## 2023-01-01 PROCEDURE — G0463 HOSPITAL OUTPT CLINIC VISIT: HCPCS | Performed by: INTERNAL MEDICINE

## 2023-01-01 PROCEDURE — 71046 X-RAY EXAM CHEST 2 VIEWS: CPT | Performed by: RADIOLOGY

## 2023-01-01 PROCEDURE — 99214 OFFICE O/P EST MOD 30 MIN: CPT | Performed by: FAMILY MEDICINE

## 2023-01-01 RX ORDER — TRAZODONE HYDROCHLORIDE 50 MG/1
300 TABLET, FILM COATED ORAL AT BEDTIME
Qty: 180 TABLET | Refills: 2 | OUTPATIENT
Start: 2023-01-01

## 2023-01-01 RX ORDER — CITALOPRAM HYDROBROMIDE 40 MG/1
40 TABLET ORAL DAILY
Qty: 1 TABLET | Refills: 0 | OUTPATIENT
Start: 2023-01-01

## 2023-01-01 RX ORDER — TRAZODONE HYDROCHLORIDE 50 MG/1
300 TABLET, FILM COATED ORAL AT BEDTIME
Qty: 180 TABLET | Refills: 2 | Status: SHIPPED | OUTPATIENT
Start: 2023-01-01 | End: 2023-01-01

## 2023-01-01 RX ORDER — DIAZEPAM 5 MG
5-10 TABLET ORAL
Status: DISCONTINUED | OUTPATIENT
Start: 2023-01-01 | End: 2023-01-01 | Stop reason: HOSPADM

## 2023-01-01 RX ORDER — BUPIVACAINE HYDROCHLORIDE 2.5 MG/ML
INJECTION, SOLUTION EPIDURAL; INFILTRATION; INTRACAUDAL DAILY PRN
Status: DISCONTINUED | OUTPATIENT
Start: 2023-01-01 | End: 2023-01-01 | Stop reason: HOSPADM

## 2023-01-01 RX ORDER — PANTOPRAZOLE SODIUM 40 MG/1
40 TABLET, DELAYED RELEASE ORAL DAILY
Qty: 30 TABLET | Refills: 1 | Status: SHIPPED | OUTPATIENT
Start: 2023-01-01 | End: 2024-01-01

## 2023-01-01 RX ORDER — ZOLPIDEM TARTRATE 10 MG/1
10 TABLET ORAL
Qty: 30 TABLET | Refills: 0 | Status: SHIPPED | OUTPATIENT
Start: 2023-01-01 | End: 2023-01-01

## 2023-01-01 RX ORDER — IOPAMIDOL 408 MG/ML
INJECTION, SOLUTION INTRATHECAL DAILY PRN
Status: DISCONTINUED | OUTPATIENT
Start: 2023-01-01 | End: 2023-01-01 | Stop reason: HOSPADM

## 2023-01-01 RX ORDER — LIDOCAINE HYDROCHLORIDE 10 MG/ML
INJECTION, SOLUTION EPIDURAL; INFILTRATION; INTRACAUDAL; PERINEURAL DAILY PRN
Status: DISCONTINUED | OUTPATIENT
Start: 2023-01-01 | End: 2023-01-01 | Stop reason: HOSPADM

## 2023-01-01 RX ORDER — FLUTICASONE PROPIONATE AND SALMETEROL 500; 50 UG/1; UG/1
1 POWDER RESPIRATORY (INHALATION) EVERY 12 HOURS
Qty: 60 EACH | Refills: 3 | Status: SHIPPED | OUTPATIENT
Start: 2023-01-01

## 2023-01-01 RX ORDER — ROSUVASTATIN CALCIUM 40 MG/1
20 TABLET, COATED ORAL DAILY
Qty: 90 TABLET | Refills: 0 | Status: SHIPPED | OUTPATIENT
Start: 2023-01-01

## 2023-01-01 RX ORDER — ZOLPIDEM TARTRATE 10 MG/1
10 TABLET ORAL
Qty: 30 TABLET | Refills: 5 | Status: SHIPPED | OUTPATIENT
Start: 2023-01-01

## 2023-01-01 RX ORDER — METHYLPREDNISOLONE ACETATE 40 MG/ML
INJECTION, SUSPENSION INTRA-ARTICULAR; INTRALESIONAL; INTRAMUSCULAR; SOFT TISSUE DAILY PRN
Status: DISCONTINUED | OUTPATIENT
Start: 2023-01-01 | End: 2023-01-01 | Stop reason: HOSPADM

## 2023-01-01 RX ORDER — ALBUTEROL SULFATE 90 UG/1
2 AEROSOL, METERED RESPIRATORY (INHALATION) EVERY 6 HOURS
Qty: 6.7 G | Refills: 1 | Status: SHIPPED | OUTPATIENT
Start: 2023-01-01 | End: 2024-01-01

## 2023-01-01 RX ORDER — ALBUTEROL SULFATE 90 UG/1
2 AEROSOL, METERED RESPIRATORY (INHALATION) EVERY 6 HOURS
Qty: 6.7 G | Refills: 0 | Status: SHIPPED | OUTPATIENT
Start: 2023-01-01 | End: 2023-01-01

## 2023-01-01 RX ORDER — TRIAMTERENE AND HYDROCHLOROTHIAZIDE 75; 50 MG/1; MG/1
1 TABLET ORAL DAILY
Qty: 30 TABLET | Refills: 0 | Status: SHIPPED | OUTPATIENT
Start: 2023-01-01 | End: 2023-01-01

## 2023-01-01 RX ORDER — ALBUTEROL SULFATE 90 UG/1
2 AEROSOL, METERED RESPIRATORY (INHALATION) EVERY 6 HOURS
Qty: 6.7 G | Refills: 1 | Status: SHIPPED | OUTPATIENT
Start: 2023-01-01 | End: 2023-01-01

## 2023-01-01 RX ORDER — BUDESONIDE AND FORMOTEROL FUMARATE DIHYDRATE 160; 4.5 UG/1; UG/1
2 AEROSOL RESPIRATORY (INHALATION) 2 TIMES DAILY
Qty: 10 G | Refills: 2 | Status: CANCELLED | OUTPATIENT
Start: 2023-01-01

## 2023-01-01 RX ORDER — ZOLPIDEM TARTRATE 5 MG/1
5 TABLET ORAL
Qty: 30 TABLET | Refills: 0 | Status: CANCELLED | OUTPATIENT
Start: 2023-01-01

## 2023-01-01 RX ORDER — TRIAMTERENE AND HYDROCHLOROTHIAZIDE 75; 50 MG/1; MG/1
1 TABLET ORAL DAILY
Qty: 90 TABLET | Refills: 3 | Status: SHIPPED | OUTPATIENT
Start: 2023-01-01

## 2023-01-01 RX ORDER — TRAZODONE HYDROCHLORIDE 50 MG/1
300 TABLET, FILM COATED ORAL AT BEDTIME
Qty: 180 TABLET | Refills: 2 | Status: SHIPPED | OUTPATIENT
Start: 2023-01-01 | End: 2024-01-01

## 2023-01-01 RX ORDER — ALBUTEROL SULFATE 90 UG/1
2 AEROSOL, METERED RESPIRATORY (INHALATION) EVERY 6 HOURS
Qty: 6.7 G | Refills: 1 | OUTPATIENT
Start: 2023-01-01

## 2023-01-01 RX ORDER — DIAZEPAM 5 MG
5-10 TABLET ORAL
Status: CANCELLED | OUTPATIENT
Start: 2023-01-01

## 2023-01-01 RX ORDER — BUDESONIDE AND FORMOTEROL FUMARATE DIHYDRATE 160; 4.5 UG/1; UG/1
2 AEROSOL RESPIRATORY (INHALATION) 2 TIMES DAILY
Qty: 10 G | Refills: 2 | Status: SHIPPED | OUTPATIENT
Start: 2023-01-01 | End: 2023-01-01

## 2023-01-01 RX ORDER — GABAPENTIN 300 MG/1
300 CAPSULE ORAL 4 TIMES DAILY
Qty: 120 CAPSULE | Refills: 11 | Status: SHIPPED | OUTPATIENT
Start: 2023-01-01

## 2023-01-01 RX ORDER — MONTELUKAST SODIUM 10 MG/1
10 TABLET ORAL EVERY EVENING
Qty: 30 TABLET | Refills: 1 | Status: SHIPPED | OUTPATIENT
Start: 2023-01-01 | End: 2024-01-01

## 2023-01-01 ASSESSMENT — ENCOUNTER SYMPTOMS
DOUBLE VISION: 0
FREQUENCY: 0
COUGH: 1
BRUISES/BLEEDS EASILY: 0
ABDOMINAL PAIN: 0
CHILLS: 0
DIARRHEA: 0
PHOTOPHOBIA: 0
HEADACHES: 0
WEIGHT LOSS: 0
BACK PAIN: 1
ORTHOPNEA: 0
BLURRED VISION: 0
WHEEZING: 1
PALPITATIONS: 0
FOCAL WEAKNESS: 0
DIZZINESS: 1
DEPRESSION: 0
POLYDIPSIA: 0
DYSURIA: 0
FEVER: 0
HEARTBURN: 1
NECK PAIN: 0
MYALGIAS: 0
TREMORS: 0

## 2023-01-01 ASSESSMENT — ANXIETY QUESTIONNAIRES
6. BECOMING EASILY ANNOYED OR IRRITABLE: NOT AT ALL
1. FEELING NERVOUS, ANXIOUS, OR ON EDGE: NOT AT ALL
GAD7 TOTAL SCORE: 0
GAD7 TOTAL SCORE: 0
7. FEELING AFRAID AS IF SOMETHING AWFUL MIGHT HAPPEN: NOT AT ALL
5. BEING SO RESTLESS THAT IT IS HARD TO SIT STILL: NOT AT ALL
3. WORRYING TOO MUCH ABOUT DIFFERENT THINGS: NOT AT ALL
4. TROUBLE RELAXING: NOT AT ALL
IF YOU CHECKED OFF ANY PROBLEMS ON THIS QUESTIONNAIRE, HOW DIFFICULT HAVE THESE PROBLEMS MADE IT FOR YOU TO DO YOUR WORK, TAKE CARE OF THINGS AT HOME, OR GET ALONG WITH OTHER PEOPLE: NOT DIFFICULT AT ALL
2. NOT BEING ABLE TO STOP OR CONTROL WORRYING: NOT AT ALL

## 2023-01-01 ASSESSMENT — PATIENT HEALTH QUESTIONNAIRE - PHQ9
10. IF YOU CHECKED OFF ANY PROBLEMS, HOW DIFFICULT HAVE THESE PROBLEMS MADE IT FOR YOU TO DO YOUR WORK, TAKE CARE OF THINGS AT HOME, OR GET ALONG WITH OTHER PEOPLE: VERY DIFFICULT
SUM OF ALL RESPONSES TO PHQ QUESTIONS 1-9: 4
SUM OF ALL RESPONSES TO PHQ QUESTIONS 1-9: 4

## 2023-01-01 ASSESSMENT — PAIN SCALES - GENERAL
PAINLEVEL: EXTREME PAIN (8)
PAINLEVEL: NO PAIN (0)

## 2023-01-02 DIAGNOSIS — J44.9 CHRONIC OBSTRUCTIVE PULMONARY DISEASE, UNSPECIFIED COPD TYPE (H): ICD-10-CM

## 2023-01-03 RX ORDER — BUDESONIDE AND FORMOTEROL FUMARATE DIHYDRATE 160; 4.5 UG/1; UG/1
2 AEROSOL RESPIRATORY (INHALATION) 2 TIMES DAILY
Qty: 10 G | Refills: 2 | Status: SHIPPED | OUTPATIENT
Start: 2023-01-03 | End: 2023-01-09

## 2023-01-09 ENCOUNTER — OFFICE VISIT (OUTPATIENT)
Dept: FAMILY MEDICINE | Facility: CLINIC | Age: 58
End: 2023-01-09
Payer: COMMERCIAL

## 2023-01-09 VITALS
BODY MASS INDEX: 35.55 KG/M2 | OXYGEN SATURATION: 97 % | HEIGHT: 69 IN | SYSTOLIC BLOOD PRESSURE: 134 MMHG | HEART RATE: 92 BPM | WEIGHT: 240 LBS | DIASTOLIC BLOOD PRESSURE: 88 MMHG | TEMPERATURE: 97.3 F

## 2023-01-09 DIAGNOSIS — Z71.6 ENCOUNTER FOR SMOKING CESSATION COUNSELING: ICD-10-CM

## 2023-01-09 DIAGNOSIS — Z12.11 ENCOUNTER FOR SCREENING FOR MALIGNANT NEOPLASM OF COLON: ICD-10-CM

## 2023-01-09 DIAGNOSIS — Z00.00 ENCOUNTER FOR PREVENTATIVE ADULT HEALTH CARE EXAMINATION: Primary | ICD-10-CM

## 2023-01-09 DIAGNOSIS — R10.9 RIGHT FLANK PAIN: ICD-10-CM

## 2023-01-09 DIAGNOSIS — G89.4 CHRONIC PAIN SYNDROME: ICD-10-CM

## 2023-01-09 DIAGNOSIS — E78.5 HYPERLIPIDEMIA LDL GOAL <100: ICD-10-CM

## 2023-01-09 DIAGNOSIS — M47.26 OSTEOARTHRITIS OF SPINE WITH RADICULOPATHY, LUMBAR REGION: ICD-10-CM

## 2023-01-09 DIAGNOSIS — F41.9 ANXIETY: ICD-10-CM

## 2023-01-09 DIAGNOSIS — J44.9 CHRONIC OBSTRUCTIVE PULMONARY DISEASE, UNSPECIFIED COPD TYPE (H): ICD-10-CM

## 2023-01-09 DIAGNOSIS — I10 BENIGN ESSENTIAL HYPERTENSION: ICD-10-CM

## 2023-01-09 DIAGNOSIS — G47.00 INSOMNIA, UNSPECIFIED TYPE: ICD-10-CM

## 2023-01-09 DIAGNOSIS — Z79.899 ENCOUNTER FOR LONG-TERM (CURRENT) USE OF MEDICATIONS: ICD-10-CM

## 2023-01-09 LAB
ALBUMIN SERPL BCG-MCNC: 4.7 G/DL (ref 3.5–5.2)
ALBUMIN UR-MCNC: NEGATIVE MG/DL
ALP SERPL-CCNC: 69 U/L (ref 40–129)
ALT SERPL W P-5'-P-CCNC: 41 U/L (ref 10–50)
APPEARANCE UR: CLEAR
AST SERPL W P-5'-P-CCNC: 29 U/L (ref 10–50)
BILIRUB DIRECT SERPL-MCNC: <0.2 MG/DL (ref 0–0.3)
BILIRUB SERPL-MCNC: 0.5 MG/DL
BILIRUB UR QL STRIP: NEGATIVE
COLOR UR AUTO: YELLOW
CREAT UR-MCNC: 100 MG/DL
GLUCOSE UR STRIP-MCNC: NEGATIVE MG/DL
HBA1C MFR BLD: 5.1 % (ref 0–5.6)
HGB UR QL STRIP: NEGATIVE
KETONES UR STRIP-MCNC: NEGATIVE MG/DL
LEUKOCYTE ESTERASE UR QL STRIP: NEGATIVE
NITRATE UR QL: NEGATIVE
PH UR STRIP: 6 [PH] (ref 5–7)
PROT SERPL-MCNC: 7.7 G/DL (ref 6.4–8.3)
PSA SERPL-MCNC: 0.32 NG/ML (ref 0–3.5)
RBC #/AREA URNS AUTO: NORMAL /HPF
SP GR UR STRIP: 1.02 (ref 1–1.03)
UROBILINOGEN UR STRIP-ACNC: 0.2 E.U./DL
WBC #/AREA URNS AUTO: NORMAL /HPF

## 2023-01-09 PROCEDURE — 90677 PCV20 VACCINE IM: CPT | Performed by: FAMILY MEDICINE

## 2023-01-09 PROCEDURE — 80076 HEPATIC FUNCTION PANEL: CPT | Performed by: FAMILY MEDICINE

## 2023-01-09 PROCEDURE — 83036 HEMOGLOBIN GLYCOSYLATED A1C: CPT | Performed by: FAMILY MEDICINE

## 2023-01-09 PROCEDURE — 99396 PREV VISIT EST AGE 40-64: CPT | Mod: 25 | Performed by: FAMILY MEDICINE

## 2023-01-09 PROCEDURE — G0103 PSA SCREENING: HCPCS | Performed by: FAMILY MEDICINE

## 2023-01-09 PROCEDURE — 90471 IMMUNIZATION ADMIN: CPT | Performed by: FAMILY MEDICINE

## 2023-01-09 PROCEDURE — 99214 OFFICE O/P EST MOD 30 MIN: CPT | Mod: 25 | Performed by: FAMILY MEDICINE

## 2023-01-09 PROCEDURE — 36415 COLL VENOUS BLD VENIPUNCTURE: CPT | Performed by: FAMILY MEDICINE

## 2023-01-09 PROCEDURE — 80307 DRUG TEST PRSMV CHEM ANLYZR: CPT | Mod: 59 | Performed by: FAMILY MEDICINE

## 2023-01-09 PROCEDURE — 81001 URINALYSIS AUTO W/SCOPE: CPT | Performed by: FAMILY MEDICINE

## 2023-01-09 RX ORDER — ROSUVASTATIN CALCIUM 40 MG/1
20 TABLET, COATED ORAL DAILY
Qty: 90 TABLET | Refills: 0 | Status: SHIPPED | OUTPATIENT
Start: 2023-01-09 | End: 2023-01-01

## 2023-01-09 RX ORDER — TRIAMTERENE AND HYDROCHLOROTHIAZIDE 75; 50 MG/1; MG/1
1 TABLET ORAL DAILY
Qty: 90 TABLET | Refills: 0 | Status: SHIPPED | OUTPATIENT
Start: 2023-01-09 | End: 2023-01-01

## 2023-01-09 RX ORDER — BUDESONIDE AND FORMOTEROL FUMARATE DIHYDRATE 160; 4.5 UG/1; UG/1
2 AEROSOL RESPIRATORY (INHALATION) 2 TIMES DAILY
Qty: 10 G | Refills: 2 | Status: SHIPPED | OUTPATIENT
Start: 2023-01-09 | End: 2023-01-01

## 2023-01-09 RX ORDER — CITALOPRAM HYDROBROMIDE 40 MG/1
40 TABLET ORAL DAILY
Qty: 90 TABLET | Refills: 1 | Status: SHIPPED | OUTPATIENT
Start: 2023-01-09

## 2023-01-09 RX ORDER — TRAZODONE HYDROCHLORIDE 50 MG/1
300 TABLET, FILM COATED ORAL AT BEDTIME
Qty: 90 TABLET | Refills: 1 | Status: SHIPPED | OUTPATIENT
Start: 2023-01-09 | End: 2023-02-08

## 2023-01-09 RX ORDER — ALBUTEROL SULFATE 1.25 MG/3ML
1.25 SOLUTION RESPIRATORY (INHALATION) EVERY 6 HOURS PRN
Qty: 3 ML | Refills: 0 | Status: SHIPPED | OUTPATIENT
Start: 2023-01-09 | End: 2023-01-19

## 2023-01-09 RX ORDER — ALBUTEROL SULFATE 90 UG/1
2 AEROSOL, METERED RESPIRATORY (INHALATION) EVERY 6 HOURS
Qty: 6.7 G | Refills: 0 | Status: SHIPPED | OUTPATIENT
Start: 2023-01-09 | End: 2023-02-08

## 2023-01-09 RX ORDER — GABAPENTIN 300 MG/1
300 CAPSULE ORAL 4 TIMES DAILY
Qty: 120 CAPSULE | Refills: 11 | Status: SHIPPED | OUTPATIENT
Start: 2023-01-09 | End: 2023-01-01

## 2023-01-09 ASSESSMENT — PAIN SCALES - GENERAL: PAINLEVEL: WORST PAIN (10)

## 2023-01-09 NOTE — PROGRESS NOTES
SUBJECTIVE:   CC: Babar is an 57 year old who presents for preventative health visit.     Patient has been advised of split billing requirements and indicates understanding: Yes  HPI    Patient continues to have persistent back pain. He went to see one of the pain specialists Dr. Lopez for   Right L3,L4 and L5 medial branch nerve radiofrequency ablation to denervate right L4-L5 and L5-S1 facet joints. Patient also reports occasional left flank pain lasted for 3-4 days and resolved spontaneously.     Also, he had an episodes of ringing sensation in both ear. One of the episodes was associated with dizziness.     Today's PHQ-2 Score:   PHQ-2 ( 1999 [employer]) 1/9/2023   Q1: Little interest or pleasure in doing things 0   Q2: Feeling down, depressed or hopeless 0   PHQ-2 Score 0   PHQ-2 Total Score (12-17 Years)- Positive if 3 or more points; Administer PHQ-A if positive -   Q1: Little interest or pleasure in doing things -   Q2: Feeling down, depressed or hopeless -   PHQ-2 Score -     Have you ever done Advance Care Planning? (For example, a Health Directive, POLST, or a discussion with a medical provider or your loved ones about your wishes): No, advance care planning information given to patient to review.  Patient plans to discuss their wishes with loved ones or provider.      Social History     Tobacco Use     Smoking status: Some Days     Packs/day: 0.50     Types: Cigarettes     Start date: 10/7/1981     Smokeless tobacco: Never   Substance Use Topics     Alcohol use: Yes     Comment: occasionally   smoking 3-4 cigs per day.     If you drink alcohol do you typically have >3 drinks per day or >7 drinks per week? Yes    3-4 drinks on Wednesday. 2-3 drinks over the weekend.     Last PSA:   Prostate Specific Antigen Screen   Date Value Ref Range Status   01/09/2023 0.32 0.00 - 3.50 ng/mL Final       Reviewed orders with patient. Reviewed health maintenance and updated orders accordingly - Yes  Lab work is in  "process    Reviewed and updated as needed this visit by clinical staff   Tobacco  Allergies  Meds  Problems  Med Hx  Surg Hx  Fam Hx          Reviewed and updated as needed this visit by Provider   Tobacco  Allergies  Meds  Problems  Med Hx  Surg Hx  Fam Hx             Review of Systems  CONSTITUTIONAL: NEGATIVE for fever, chills, change in weight  INTEGUMENTARY/SKIN: NEGATIVE for worrisome rashes, moles or lesions  EYES: NEGATIVE for vision changes or irritation  ENT: NEGATIVE for ear, mouth and throat problems  RESP: NEGATIVE for significant cough or SOB  CV: NEGATIVE for chest pain, palpitations or peripheral edema  GI: NEGATIVE for nausea, abdominal pain, heartburn, or change in bowel habits   male: negative for dysuria, hematuria, decreased urinary stream, erectile dysfunction, urethral discharge  MUSCULOSKELETAL: NEGATIVE for significant arthralgias or myalgia  NEURO: NEGATIVE for weakness, dizziness or paresthesias  PSYCHIATRIC: NEGATIVE for changes in mood or affect    OBJECTIVE:   /88 (BP Location: Left arm, Patient Position: Sitting, Cuff Size: Adult Regular)   Pulse 92   Temp 97.3  F (36.3  C) (Temporal)   Ht 1.75 m (5' 8.9\")   Wt 108.9 kg (240 lb)   SpO2 97%   BMI 35.55 kg/m      Physical Exam  GENERAL: healthy, alert and no distress  EYES: Eyes grossly normal to inspection, PERRL and conjunctivae and sclerae normal  HENT: ear canals and TM's normal, nose and mouth without ulcers or lesions  NECK: no adenopathy, no asymmetry, masses, or scars and thyroid normal to palpation  RESP: lungs clear to auscultation - no rales, rhonchi or wheezes  CV: regular rate and rhythm, normal S1 S2, no S3 or S4, no murmur, click or rub, no peripheral edema and peripheral pulses strong  ABDOMEN: soft, nontender, no hepatosplenomegaly, no masses and bowel sounds normal  MS: no gross musculoskeletal defects noted, no edema  SKIN: no suspicious lesions or rashes  NEURO: Normal strength and tone, " mentation intact and speech normal  PSYCH: mentation appears normal, affect normal/bright    Diagnostic Test Results:  Labs reviewed in Epic    ASSESSMENT/PLAN:   Babar was seen today for physical.    Diagnoses and all orders for this visit:    Encounter for preventative adult health care examination  -     PSA, screen; Future  -     Hemoglobin A1c; Future    Encounter for screening for malignant neoplasm of colon  -     MYESHA([employer]); Future    Chronic obstructive pulmonary disease, unspecified COPD type (H)  -     albuterol (ACCUNEB) 1.25 MG/3ML neb solution; Take 1 vial (1.25 mg) by nebulization every 6 hours as needed for shortness of breath or wheezing  -     albuterol (PROAIR HFA/PROVENTIL HFA/VENTOLIN HFA) 108 (90 Base) MCG/ACT inhaler; Inhale 2 puffs into the lungs every 6 hours  -     budesonide-formoterol (SYMBICORT) 160-4.5 MCG/ACT Inhaler; Inhale 2 puffs into the lungs 2 times daily    Osteoarthritis of spine with radiculopathy, lumbar region  -     gabapentin (NEURONTIN) 300 MG capsule; Take 1 capsule (300 mg) by mouth 4 times daily for 30 days TAKE 1 CAPSULE BY MOUTH FOUR TIMES DAILY    Hyperlipidemia LDL goal <100  -     rosuvastatin (CRESTOR) 40 MG tablet; Take 0.5 tablets (20 mg) by mouth daily    Benign essential hypertension  -     triamterene-HCTZ (MAXZIDE) 75-50 MG tablet; Take 1 tablet by mouth daily    Insomnia, unspecified type  -     traZODone (DESYREL) 50 MG tablet; Take 6 tablets (300 mg) by mouth At Bedtime    Chronic pain syndrome  -     [id number] - Urine Drug Confirmation Panel (Comprehensive); Future    Encounter for smoking cessation counseling  -     CT Chest Lung Cancer Scrn Low Dose wo; Future    Anxiety  -     citalopram (CELEXA) 40 MG tablet; Take 1 tablet (40 mg) by mouth daily    Right flank pain  -     Hepatic panel (Albumin, ALT, AST, Bili, Alk Phos, TP); Future  -     US Abdomen Limited; Future  -     US Kidney Right; Future  -     UA with Microscopic reflex to  Culture - lab collect; Future  -     Urine Microscopic    Other orders  -     Pneumococcal 20 Valent Conjugate (Prevnar 20)      Patient has been advised of split billing requirements and indicates understanding: Yes      COUNSELING:   Reviewed preventive health counseling, as reflected in patient instructions       Regular exercise       Healthy diet/nutrition      He reports that he has been smoking cigarettes. He started smoking about 41 years ago. He has been smoking an average of .5 packs per day. He has never used smokeless tobacco.  Nicotine/Tobacco Cessation Plan:   Information offered: Patient not interested at this time    Nabila Newby MD  [facility]

## 2023-01-09 NOTE — LETTER
My COPD Action Plan     Name: Babar Bhatt    YOB: 1965   Date: 1/21/2023    My doctor: Nabila Newby MD   My clinic: [address], [address]  [facility] [address] [phone]  My Controller Medicine: Formoterol/Budesonide (Symbicort)   Dose: 160-4.5     My Rescue Medicine: Albuterol (Proair/Ventolin/Proventil) inhaler   Dose: 108 - 2 puffs every 6 hours as needed for shortness of breath     My Flare Up Medicine: none   Dose: none     My COPD Severity: Moderate = FeV1 < 79% -50%      Use of Oxygen: Oxygen Not Prescribed      Make sure you've had your pneumonia   vaccines.          GREEN ZONE       Doing well today      Usual level of activity and exercise    Usual amount of cough and mucus    No shortness of breath    Usual level of health (thinking clearly, sleeping well, feel like eating) Actions:      Take daily medicines    Use oxygen as prescribed    Follow regular exercise and diet plan    Avoid cigarette smoke and other irritants that harm the lungs           YELLOW ZONE          Having a bad day or flare up      Short of breath more than usual    A lot more sputum (mucus) than usual    Sputum looks yellow, green, tan, brown or bloody    More coughing or wheezing    Fever or chills    Less energy; trouble completing activities    Trouble thinking or focusing    Using quick relief inhaler or nebulizer more often    Poor sleep; symptoms wake me up    Do not feel like eating Actions:      Get plenty of rest    Take daily medicines    Use quick relief inhaler every 6 hours    If you use oxygen, call you doctor to see if you should adjust your oxygen    Do breathing exercises or other things to help you relax    Let a loved one, friend or neighbor know you are feeling worse    Call your care team if you have 2 or more symptoms.  Start taking steroids or antibiotics if directed by your care team           RED ZONE       Need medical care  now      Severe shortness of breath (feel you can't breathe)    Fever, chills    Not enough breath to do any activity    Trouble coughing up mucus, walking or talking    Blood in mucus    Frequent coughing   Rescue medicines are not working    Not able to sleep because of breathing    Feel confused or drowsy    Chest pain    Actions:      Call your health care team.  If you cannot reach your care team, call 911 or go to the emergency room.        Annual Reminders:  Meet with Care Team, Flu Shot every Fall  Pharmacy: [id number] - [place] [address] AT [facility] AND [address]

## 2023-01-09 NOTE — LETTER
[facility]  01/09/23  Patient: Babar Bhatt  YOB: 1965  Medical Record Number: [id number]                                                                                  Non-Opioid Controlled Substance Agreement    This is an agreement between you and your provider regarding safe and appropriate use of controlled substances prescribed by your care team. Controlled substances are?medicines that can cause physical and mental dependence (abuse).     There are strict laws about having and using these medicines. We here at [facility] are  committed to working with you in your efforts to get better. To support you in this work, we'll help you schedule regular office appointments for medicine refills. If we must cancel or change your appointment for any reason, we'll make sure you have enough medicine to last until your next appointment.     As a Provider, I will:     Listen carefully to your concerns while treating you with respect.     Recommend a treatment plan that I believe is in your best interest and may involve therapies other than medicine.      Talk with you often about the possible benefits and the risk of harm of any medicine that we prescribe for you.    Assess the safety of this medicine and check how well it works.      Provide a plan on how to taper (discontinue or go off) using this medicine if the decision is made to stop its use.      ::  As a Patient, I understand controlled substances:       Are prescribed by my care provider to help me function or work and manage my condition(s).?    Are strong medicines and can cause serious side effects.       Need to be taken exactly as prescribed.?Combining controlled substances with certain medicines or chemicals (such as illegal drugs, alcohol, sedatives, sleeping pills, and benzodiazepines) can be dangerous or even fatal.? If I stop taking my medicines suddenly, I may have severe withdrawal symptoms.     The risks,  benefits, and side effects of these medicine(s) were explained to me. I agree that:    1. I will take part in other treatments as advised by my care team. This may be psychiatry or counseling, physical therapy, behavioral therapy, group treatment or a referral to specialist.    2. I will keep all my appointments and understand this is part of the monitoring of controlled substances.?My care team may require an office visit for EVERY controlled substance refill. If I miss appointments or don t follow instructions, my care team may stop my medicine    3. I will take my medicines as prescribed. I will not change the dose or schedule unless my care team tells me to. There will be no refills if I run out early.      4. I may be asked to come to the clinic and complete a urine drug test or complete a pill count. If I don t give a urine sample or participate in a pill count, the care team may stop my medicine.    5. I will only receive controlled substance prescriptions from this clinic. If I am treated by another provider, I will tell them that I am taking controlled substances and that I have a treatment agreement with this provider. I will inform my [facility] care team within one business day if I am given a prescription for any controlled substance by another healthcare provider. My [facility] care team can contact other providers and pharmacists about my use of any medicines.    6. It is up to me to make sure that I don't run out of my medicines on weekends or holidays.?If my care team is willing to refill my prescription without a visit, I must request refills only during office hours. Refills may take up to 3 business days to process. I will use one pharmacy to fill all my controlled substance prescriptions. I will notify the clinic about any changes to my insurance or medicine availability.    7. I am responsible for my prescriptions. If the medicine/prescription is lost, stolen or destroyed, it will  not be replaced.?I also agree not to share controlled substance medicines with anyone.     8. I am aware I should not use any illegal or recreational drugs. I agree not to drink alcohol unless my care team says I can.     9. If I enroll in the [place] Medical Cannabis program, I will tell my care team before my next refill.    10. I will tell my care team right away if I become pregnant, have a new medical problem treated outside of my regular clinic, or have a change in my medicines.     11. I understand that this medicine can affect my thinking, judgment and reaction time.? Alcohol and drugs affect the brain and body, which can affect the safety of my driving. Being under the influence of alcohol or drugs can affect my decision-making, behaviors, personal safety and the safety of others. Driving while impaired (DWI) can occur if a person is driving, operating or in physical control of a car, motorcycle, boat, snowmobile, ATV, motorbike, off-road vehicle or any other motor vehicle ([place] Statute 169A.20). I understand the risk if I choose to drive or operate any vehicle or machinery.    I understand that if I do not follow any of the conditions above, my prescriptions or treatment may be stopped or changed.   I agree that my provider, clinic care team and pharmacy may work with any city, state or federal law enforcement agency that investigates the misuse, sale or other diversion of my controlled medicine. I will allow my provider to discuss my care with, or share a copy of, this agreement with any other treating provider, pharmacy or emergency room where I receive care.     I have read this agreement and have asked questions about anything I did not understand.    ________________________________________________________  Patient Signature - Babar Bhatt     ___________________                   Date     ________________________________________________________  Provider Signature - Nabila Newby MD        ___________________                   Date     ________________________________________________________  Witness Signature (required if provider not present while patient signing)          ___________________                   Date

## 2023-01-09 NOTE — NURSING NOTE
Prior to immunization administration, verified patients identity using patient s name and date of birth. Please see Immunization Activity for additional information.     Screening Questionnaire for Adult Immunization    Are you sick today?   No   Do you have allergies to medications, food, a vaccine component or latex?   No   Have you ever had a serious reaction after receiving a vaccination?   No   Do you have a long-term health problem with heart, lung, kidney, or metabolic disease (e.g., diabetes), asthma, a blood disorder, no spleen, complement component deficiency, a cochlear implant, or a spinal fluid leak?  Are you on long-term aspirin therapy?   No   Do you have cancer, leukemia, HIV/AIDS, or any other immune system problem?   No   Do you have a parent, brother, or sister with an immune system problem?   No   In the past 3 months, have you taken medications that affect  your immune system, such as prednisone, other steroids, or anticancer drugs; drugs for the treatment of rheumatoid arthritis, Crohn s disease, or psoriasis; or have you had radiation treatments?   No   Have you had a seizure, or a brain or other nervous system problem?   No   During the past year, have you received a transfusion of blood or blood    products, or been given immune (gamma) globulin or antiviral drug?   No   For women: Are you pregnant or is there a chance you could become       pregnant during the next month?   No   Have you received any vaccinations in the past 4 weeks?   No     Immunization questionnaire answers were all negative.        Per orders of Dr. ALMONTE, injection of PNEUMOCOCAL 20  given by Manny Holloway MA. Patient instructed to remain in clinic for 15 minutes afterwards, and to report any adverse reaction to me immediately.       Screening performed by Manny Holloway MA on 1/9/2023 at 3:00 PM.

## 2023-01-09 NOTE — PATIENT INSTRUCTIONS
"After Visit Summary/Follow-up Plan  1) Schedule your ultrasound  2) Return the Cologuard kit.       Thank you for allowing us the privilege of caring for you. We hope we provided you with the excellent service you deserve. I value your experience and would be very thankful for your time with providing feedback on our clinic survey. You may receive a survey via email or text message in the next few days.    Nabila Newby MD     RADIOLOGY SCHEDULING (Including Mammograms, Ultrasound, CT and MRI scans and Dexa Scans):  - [facility]: [phone]  - [facility]: [phone]  - [facility]: [phone]  - [place], [place] and [place]: [phone]    Colonoscopy Scheduling:    If you wish to schedule within [facility]alth, we have several options to help you schedule your appointment:     Call [phone] option 2,  Monday-Friday 8 am to 4:30 pm and we will be happy to assist you.   Visit our website at [email or web address] and click \"I Want To\" (in upper right) and select Request an Appointment  You can also request an appointment via [facility] if applicable or by visiting [email or web address]         "

## 2023-01-09 NOTE — PROGRESS NOTES
Immunizations Administered     Name Date Dose VIS Date Route    Pneumococcal 20 valent Conjugate (Prevnar 20) 1/9/23  2:21 PM 0.5 mL 02/04/2022, Given Today Intramuscular        Right deltoid

## 2023-01-10 ENCOUNTER — TELEPHONE (OUTPATIENT)
Dept: FAMILY MEDICINE | Facility: CLINIC | Age: 58
End: 2023-01-10

## 2023-01-10 ENCOUNTER — TELEPHONE (OUTPATIENT)
Dept: ANESTHESIOLOGY | Facility: CLINIC | Age: 58
End: 2023-01-10

## 2023-01-10 DIAGNOSIS — G89.29 CHRONIC BILATERAL THORACIC BACK PAIN: ICD-10-CM

## 2023-01-10 DIAGNOSIS — M54.6 CHRONIC BILATERAL THORACIC BACK PAIN: ICD-10-CM

## 2023-01-10 NOTE — TELEPHONE ENCOUNTER
Tizanidine refilled. No refills added to prescription.   Pt has follow up scheduled for 1/26/23 with Dr. Lopez.     Yuli Harris LPN

## 2023-01-10 NOTE — TELEPHONE ENCOUNTER
Prior Authorization Retail Medication Request    Medication/Dose: budesonide-formoterol (SYMBICORT) 160-4.5 MCG/ACT Inhaler  ICD code (if different than what is on RX):  Chronic obstructive pulmonary disease, unspecified COPD type (H) [J44.9]   Previously Tried and Failed:  unknown  Rationale:  unknown    Insurance Name:  [facility]  Insurance ID:  [id number]      Pharmacy Information (if different than what is on RX)  Name:  [facility]  Phone:  [phone]

## 2023-01-10 NOTE — TELEPHONE ENCOUNTER
[facility] Call Center    Phone Message    May a detailed message be left on voicemail: yes     Reason for Call: Medication Refill Request      Has the patient contacted the pharmacy for the refill? Yes     Name of medication being requested:     Pt doesn't know the name of the medication but says it's his muscle relaxer    Provider who prescribed the medication: William Lopez    Pharmacy: [facility] [address]    Date medication is needed: ASAP

## 2023-01-12 LAB — GABAPENTIN UR QL CFM: PRESENT

## 2023-01-12 NOTE — TELEPHONE ENCOUNTER
Central Prior Authorization Team   Phone: [phone]    PA Initiation    Medication: budesonide-formoterol (SYMBICORT) 160-4.5 MCG/ACT Inhaler  Insurance Company: [employer] - Phone [phone] Fax [phone]  Pharmacy Filling the Rx: [facility][id number] - [place], [place] - [address] AT [facility] [address]  Filling Pharmacy Phone: [phone]  Filling Pharmacy Fax:    Start Date: 1/12/2023

## 2023-01-12 NOTE — TELEPHONE ENCOUNTER
Prior Authorization Not Needed per Insurance    Medication: budesonide-formoterol (SYMBICORT) 160-4.5 MCG/ACT Inhaler  Insurance Company: [employer] - Phone [phone] Fax [phone]  Expected CoPay:      Pharmacy Filling the Rx: [id number] - [place], [place] - [address] AT [facility] AND [address]  Pharmacy Notified: Yes  Patient Notified: Yes    Called pharmacy and pharmacy stated that PA is Not Needed and Brand Symbicort is covered

## 2023-01-26 ENCOUNTER — TELEPHONE (OUTPATIENT)
Dept: ANESTHESIOLOGY | Facility: CLINIC | Age: 58
End: 2023-01-26

## 2023-01-26 ENCOUNTER — OFFICE VISIT (OUTPATIENT)
Dept: ANESTHESIOLOGY | Facility: CLINIC | Age: 58
End: 2023-01-26
Payer: COMMERCIAL

## 2023-01-26 VITALS — OXYGEN SATURATION: 98 % | HEART RATE: 98 BPM | SYSTOLIC BLOOD PRESSURE: 170 MMHG | DIASTOLIC BLOOD PRESSURE: 85 MMHG

## 2023-01-26 DIAGNOSIS — M54.16 LUMBAR RADICULOPATHY: Primary | ICD-10-CM

## 2023-01-26 DIAGNOSIS — M79.18 MYOFASCIAL PAIN: ICD-10-CM

## 2023-01-26 DIAGNOSIS — M47.816 LUMBAR FACET ARTHROPATHY: ICD-10-CM

## 2023-01-26 DIAGNOSIS — M54.6 CHRONIC BILATERAL THORACIC BACK PAIN: ICD-10-CM

## 2023-01-26 DIAGNOSIS — M47.816 LUMBAR SPONDYLOSIS: ICD-10-CM

## 2023-01-26 DIAGNOSIS — G89.29 CHRONIC BILATERAL THORACIC BACK PAIN: ICD-10-CM

## 2023-01-26 DIAGNOSIS — M51.26 LUMBAR DISC HERNIATION: ICD-10-CM

## 2023-01-26 PROCEDURE — 99214 OFFICE O/P EST MOD 30 MIN: CPT | Performed by: ANESTHESIOLOGY

## 2023-01-26 RX ORDER — TRAMADOL HYDROCHLORIDE 50 MG/1
50 TABLET ORAL DAILY PRN
Qty: 10 TABLET | Refills: 0 | Status: SHIPPED | OUTPATIENT
Start: 2023-01-26 | End: 2023-02-05

## 2023-01-26 RX ORDER — DIAZEPAM 5 MG
5-10 TABLET ORAL
Status: CANCELLED | OUTPATIENT
Start: 2023-01-26

## 2023-01-26 ASSESSMENT — PAIN SCALES - GENERAL: PAINLEVEL: EXTREME PAIN (8)

## 2023-01-26 NOTE — NURSING NOTE
Patient presents with:  Follow Up: Hip/groin pain      Extreme Pain (8)         What medications are you using for pain? Gabapentin, tizanidine    (Return Patients only) What refills are you needing today? Tizanidine    Suman Plata, EMT

## 2023-01-26 NOTE — NURSING NOTE
Patient left before AVS could be reviewed. Writer called patient and left a voicemail to discuss. See other telephone encounter.     Joanne Ortiz RN

## 2023-01-26 NOTE — TELEPHONE ENCOUNTER
Pt returning call to Joanne Rodriguez    [facility] [place]

## 2023-01-26 NOTE — TELEPHONE ENCOUNTER
RN called patient and left a voicemail. Writer calling to discuss After Visit Summary and Pre-Procedure instructions from today's clinic visit with Dr. Lopez. Left call back number [phone] to discuss with nursing team.       Joanne Ortiz RN

## 2023-01-26 NOTE — PATIENT INSTRUCTIONS
Medications:    tiZANidine (ZANAFLEX) 4 MG tablet. Take 1 tablet (4 mg) by mouth 3 times daily as needed for muscle spasms.       traMADol (ULTRAM) 50 MG tablet. Take 1 tablet (50 mg) by mouth daily as needed for severe pain (7-10).       *Please provide the clinic with a minium of 1 week notice, on all prescription refills.       Procedures:    Call to schedule your procedure: [phone] option #2    Epidural Steroid Injection    Your pre-procedure instructions are below, please call our clinic if you have any questions.        Recommended Follow up:      Follow up as needed after procedure.          To speak with a nurse, schedule/reschedule/cancel a clinic appointment, or request a medication refill call: [phone]    You can also reach us by [employer]: [email or web address]     Procedure Information related to COVID-19     Please call [phone] option #2 to schedule, reschedule, or cancel your procedure appointment.   Phones are answered Monday - Friday from 08:00 - 4:30pm.  Leave a voicemail with your name, birth date, and phone number if no one is available to take your call.        You no longer need to test for COVID- 19 prior to your procedure/surgery, unless your physician specifically requests that you test. If you experience COVID symptoms or have tested positive for COVID-19 within 14 days of your scheduled surgery or procedure, please update our office right away and your procedure may have to be postponed.       The procedure center staff will call you several days before the procedure to review important information that you will need to know for the day of the procedure.     Please contact the clinic if you have further questions about this information [phone].        Information related to Scheduling and Pre-Procedure Instructions:    If you must reschedule your procedure more than two times, you must follow up in clinic before rescheduling again.    Preparing for your  procedure    CAUTION - FAILURE TO FOLLOW THESE PRE-PROCEDURE INSTRUCTIONS WILL RESULT IN YOUR PROCEDURE BEING RESCHEDULED.    Your Procedure: Epidural Steroid Injection            You must have a  take you home after your procedure. Transportation by taxi or para-transit is okay as long as you have a responsible adult accompany you. You must provide your 's full name and contact number at time of check in.     Fasting Protocol Please have nothing to eat or drink 1 hour prior to arrival.     Medications If you take any medications, DO NOT STOP. Take your medications as usual the day of your procedure with a sip of water AT LEAST 2 HOURS PRIOR TO ARRIVAL.    Antibiotics If you are currently taking antibiotics, you must complete the entire dose 7 days prior to your scheduled procedure. You must be clear of any signs or symptoms of infection. If you begin antibiotics, please contact our clinic for instructions.     Fever, Chills, or Rash If you experience a fever of higher than 100 degrees, chills, rash, or open wounds during the one week before your procedure, please call the clinic to see if you may proceed with your procedure.      Medication Hold List  **Patients under Cardiology/Neurology care should consult their provider prior to the pain procedure to verify pre-procedure medication instructions. The information below contains general guidelines.**    Blood Thinners If you are taking daily ASPIRIN, PLAVIX, OR OTHER BLOOD THINNERS SUCH AS COUMADIN/WARFARIN, we will need your prescribing doctor to sign a release permitting you to stop these medications. Once approved by your prescribing doctor - STOP ALL BLOOD THINNERS BASED ON THE TIME TABLE BELOW PRIOR TO YOUR PROCEDURE. If you have been instructed to stop WARFARIN(COUMADIN), you must have an INR lab drawn the day before your procedure. . Your INR must be within normal limits before we can perform your injection. MEDICATIONS CAN BE RESTARTED  AFTER YOUR PROCEDURE.    14 DAY HOLD  Ticlid (ticlopidine)    10 DAY HOLD  Effient (Prasugel)    3 DAY HOLD  Xarelto (rivaroxaban) 7 DAY HOLD  Anacin, Bufferin, Ecotrin, Excedrin, Aggrenox (Aspirin)  Brilinta (ticagrelor)  Coumadin (Warfarin)  Pradexa (Dabigatran)  Elmiron (Pentosan)  Plavix (Clopidogrel Bisulfate)  Pletal (Cilostazol)    24 HOUR HOLD  Lovenox (enoxaparin)  Agrylin (Anagrelide)        Non-steroidal Anti-inflammatories (NSAIDs) DO NOT TAKE any non-steroidal anti-inflammatory medications (NSAIDs) listed on the table below. MEDICATIONS CAN BE RESTARTED AFTER YOUR PROCEDURE. Celebrex is OK to take and does not need to be discontinued.     Medications to stop:  3 DAY HOLD  Advil, Motrin (Ibuprofen)  Arthrotec (diciofenac sodium/misoprostol)  Clinoril (Sulindac)  Indocin (Indomethacin)  Lodine (Etodolac)  Toradol (Ketorolac)  Vicoprofen (Hydrocodone and Ibuprofen)  Voltaren (Diclofenac)    14 DAY HOLD  Daypro (Oxaprozin)  Feldene (Piroxicam)   7 DAY HOLD  Aleve (Naproxen sodium)  Darvon compound (contains aspirin)  Naprosyn (Naproxen)  Norgesic Forte (contains aspirin)  Mobic (Meloxicam)  Oruvall (Ketoprofen)  Percodan (contains aspirin)  Relafen (Nabumetone)  Salsalate  Trilisate  Vitamin E (more than 400 mg per day)  Any medication containing aspirin                To speak with a nurse, schedule/reschedule/cancel a clinic appointment, or request a medication refill call: [phone]    You can also reach us by [employer]: [email or web address]

## 2023-01-26 NOTE — PROGRESS NOTES
[facility] : Progress Note    Date of visit: 1/26/2023    Interval history:  Babar Bhatt is a 57 year old male who  is known to me for chronic low back pain.  His past medical history significant for hypertension, depression, and COPD.    At first, his pain mostly located in the lower back, which was dull aching in nature likely from facet joint degeneration.  He had intra-articular facet joint injection and lumbar medial branch nerve radiofrequency ablation with good pain relief.  Now he reports radicular pain to the lower extremity left greater than the right.  His MRI of the lumbar spine performed on 7/21/2021 shows focal right central/subarticular disc protrusion contacting the traversing right S1 nerve root.  Interestingly this does not correspond with his clinical symptoms which is mostly on the left.   MRI was done 1 and half year ago.  It is likely that his pain has been progressed.  He states that this pain is new and bothering him more than the lower back pain.  He does have some lower back pain which is tolerable with Zanaflex and gabapentin.    [place] Prescription Monitoring Program:   Reviewed. No concerns     Review of Systems:  The 14 system ROS was reviewed and was negative except what is documented above and as follows.  Any bowel or bladder problems: none  Mood: okay    Physical Exam:  Vitals:    01/26/23 1039   BP: (!) 170/85   BP Location: Right arm   Patient Position: Chair   Cuff Size: Adult Large   Pulse: 98   SpO2: 98%       Medications:  Current Outpatient Medications   Medication Sig Dispense Refill     albuterol (ACCUNEB) 1.25 MG/3ML neb solution Take 1 vial (1.25 mg) by nebulization every 6 hours as needed for shortness of breath or wheezing 3 mL 3     albuterol (PROAIR HFA/PROVENTIL HFA/VENTOLIN HFA) 108 (90 Base) MCG/ACT inhaler Inhale 2 puffs into the lungs every 6 hours 6.7 g 0     budesonide-formoterol  (SYMBICORT) 160-4.5 MCG/ACT Inhaler Inhale 2 puffs into the lungs 2 times daily 10 g 2     buPROPion (WELLBUTRIN XL) 300 MG 24 hr tablet        citalopram (CELEXA) 40 MG tablet Take 1 tablet (40 mg) by mouth daily 90 tablet 1     gabapentin (NEURONTIN) 300 MG capsule Take 1 capsule (300 mg) by mouth 4 times daily for 30 days TAKE 1 CAPSULE BY MOUTH FOUR TIMES DAILY 120 capsule 11     rosuvastatin (CRESTOR) 40 MG tablet Take 0.5 tablets (20 mg) by mouth daily 90 tablet 0     tiZANidine (ZANAFLEX) 4 MG tablet Take 1 tablet (4 mg) by mouth 3 times daily as needed for muscle spasms 90 tablet 0     traZODone (DESYREL) 50 MG tablet Take 6 tablets (300 mg) by mouth At Bedtime 90 tablet 1     triamterene-HCTZ (MAXZIDE) 75-50 MG tablet Take 1 tablet by mouth daily 90 tablet 0     zolpidem (AMBIEN) 5 MG tablet Take 5 mg by mouth nightly as needed for sleep         Analgesic Medications:   Medications related to Pain Management (From now, onward)    None             LABORATORY VALUES:   Recent Labs   Lab Test 09/21/22  1408 04/07/21  1450    141   POTASSIUM 4.2 4.0   CHLORIDE 108 106   CO2 27 29   ANIONGAP 6 6   * 110*   BUN 17 15   CR 0.95 0.90   LARS 8.8 9.0       CBC RESULTS:   Recent Labs   Lab Test 09/21/22  1408   WBC 8.9   RBC 4.40   HGB 13.0*   HCT 43.1   MCV 98   MCH 29.5   MCHC 30.2*   RDW 12.3          Most Recent 3 INR's:No lab results found.        ASSESSMENT:       Diagnoses       Codes Comments    Lumbar radiculopathy    -  Primary M54.16     Lumbar facet arthropathy     M47.816     Lumbar disc herniation     M51.26     Chronic bilateral thoracic back pain     M54.6, G89.29     Lumbar spondylosis     M47.816     Myofascial pain     M79.18         PLAN:    1. Medications.     -Continue Zanaflex 4 mg 3 times daily as needed.  90 tablets dispensed with 3 refills.  -Continue gabapentin 300 mg 4 times daily.  Discussed about increase the dose of gabapentin or changing to Lyrica.  He will consider  this in the future.  -Tramadol 50 mg daily as needed.  Advised him to take it as a last resort..  10 tablets dispensed.  No monthly refills.      2. Interventional procedures:    Ordered lumbar epidural steroid injection.  Risk of the procedure including bleeding, infection, failed procedure, nerve injury, paralysis discussed with the patient.  All of his questions were answered.    3. Labs and imaging: None needed for pain management.      4. Rehab: The patient is  encouraged to stay active as tolerated.     5. Psychology: No current needs.       7. Disposition: We will see patient for the above-mentioned procedure.      Assessment will be ongoing with changes in treatment as indicated.  Benefits/risks/alternatives to treatment have been reviewed and the patient has been instructed to contact this office if they have any questions or concerns.  This plan of care has been discussed with the patient and the patient is in agreement.     William Lopez MD, PHD

## 2023-01-26 NOTE — LETTER
1/26/2023       RE: Babar Bhatt  [address] [place] [place] [address]     Dear Colleague,    Thank you for referring your patient, Babar Bhatt, to the [facility]. Please see a copy of my visit note below.    [facility] : Progress Note    Date of visit: 1/26/2023    Interval history:  Babar Bhatt is a 57 year old male who  is known to me for chronic low back pain.  His past medical history significant for hypertension, depression, and COPD.    At first, his pain mostly located in the lower back, which was dull aching in nature likely from facet joint degeneration.  He had intra-articular facet joint injection and lumbar medial branch nerve radiofrequency ablation with good pain relief.  Now he reports radicular pain to the lower extremity left greater than the right.  His MRI of the lumbar spine performed on 7/21/2021 shows focal right central/subarticular disc protrusion contacting the traversing right S1 nerve root.  Interestingly this does not correspond with his clinical symptoms which is mostly on the left.   MRI was done 1 and half year ago.  It is likely that his pain has been progressed.  He states that this pain is new and bothering him more than the lower back pain.  He does have some lower back pain which is tolerable with Zanaflex and gabapentin.    [place] Prescription Monitoring Program:   Reviewed. No concerns     Review of Systems:  The 14 system ROS was reviewed and was negative except what is documented above and as follows.  Any bowel or bladder problems: none  Mood: okay    Physical Exam:  Vitals:    01/26/23 1039   BP: (!) 170/85   BP Location: Right arm   Patient Position: Chair   Cuff Size: Adult Large   Pulse: 98   SpO2: 98%       Medications:  Current Outpatient Medications   Medication Sig  Dispense Refill     albuterol (ACCUNEB) 1.25 MG/3ML neb solution Take 1 vial (1.25 mg) by nebulization every 6 hours as needed for shortness of breath or wheezing 3 mL 3     albuterol (PROAIR HFA/PROVENTIL HFA/VENTOLIN HFA) 108 (90 Base) MCG/ACT inhaler Inhale 2 puffs into the lungs every 6 hours 6.7 g 0     budesonide-formoterol (SYMBICORT) 160-4.5 MCG/ACT Inhaler Inhale 2 puffs into the lungs 2 times daily 10 g 2     buPROPion (WELLBUTRIN XL) 300 MG 24 hr tablet        citalopram (CELEXA) 40 MG tablet Take 1 tablet (40 mg) by mouth daily 90 tablet 1     gabapentin (NEURONTIN) 300 MG capsule Take 1 capsule (300 mg) by mouth 4 times daily for 30 days TAKE 1 CAPSULE BY MOUTH FOUR TIMES DAILY 120 capsule 11     rosuvastatin (CRESTOR) 40 MG tablet Take 0.5 tablets (20 mg) by mouth daily 90 tablet 0     tiZANidine (ZANAFLEX) 4 MG tablet Take 1 tablet (4 mg) by mouth 3 times daily as needed for muscle spasms 90 tablet 0     traZODone (DESYREL) 50 MG tablet Take 6 tablets (300 mg) by mouth At Bedtime 90 tablet 1     triamterene-HCTZ (MAXZIDE) 75-50 MG tablet Take 1 tablet by mouth daily 90 tablet 0     zolpidem (AMBIEN) 5 MG tablet Take 5 mg by mouth nightly as needed for sleep         Analgesic Medications:   Medications related to Pain Management (From now, onward)    None             LABORATORY VALUES:   Recent Labs   Lab Test 09/21/22  1408 04/07/21  1450    141   POTASSIUM 4.2 4.0   CHLORIDE 108 106   CO2 27 29   ANIONGAP 6 6   * 110*   BUN 17 15   CR 0.95 0.90   LARS 8.8 9.0       CBC RESULTS:   Recent Labs   Lab Test 09/21/22  1408   WBC 8.9   RBC 4.40   HGB 13.0*   HCT 43.1   MCV 98   MCH 29.5   MCHC 30.2*   RDW 12.3          Most Recent 3 INR's:No lab results found.        ASSESSMENT:       Diagnoses       Codes Comments    Lumbar radiculopathy    -  Primary M54.16     Lumbar facet arthropathy     M47.816     Lumbar disc herniation     M51.26     Chronic bilateral thoracic back pain     M54.6,  G89.29     Lumbar spondylosis     M47.816     Myofascial pain     M79.18         PLAN:    1. Medications.     -Continue Zanaflex 4 mg 3 times daily as needed.  90 tablets dispensed with 3 refills.  -Continue gabapentin 300 mg 4 times daily.  Discussed about increase the dose of gabapentin or changing to Lyrica.  He will consider this in the future.  -Tramadol 50 mg daily as needed.  Advised him to take it as a last resort..  10 tablets dispensed.  No monthly refills.      2. Interventional procedures:    Ordered lumbar epidural steroid injection.  Risk of the procedure including bleeding, infection, failed procedure, nerve injury, paralysis discussed with the patient.  All of his questions were answered.    3. Labs and imaging: None needed for pain management.      4. Rehab: The patient is  encouraged to stay active as tolerated.     5. Psychology: No current needs.       7. Disposition: We will see patient for the above-mentioned procedure.      Assessment will be ongoing with changes in treatment as indicated.  Benefits/risks/alternatives to treatment have been reviewed and the patient has been instructed to contact this office if they have any questions or concerns.  This plan of care has been discussed with the patient and the patient is in agreement.     William Lopez MD, PHD

## 2023-01-27 NOTE — TELEPHONE ENCOUNTER
RN returned call and spoke with patient. Writer discussed pre-procedure instructions for INJECTION, SPINE, LUMBAR, EPIDURAL (left L5-S1)   Patient verbalized understanding to holding appropriate medication per protocol and was agreeable to NPO policy and needing a .    Anticoagulant: None reported    Recommended Follow Up: as needed after procedure    Joanne Ortiz RN

## 2023-01-30 ENCOUNTER — TELEPHONE (OUTPATIENT)
Dept: ANESTHESIOLOGY | Facility: CLINIC | Age: 58
End: 2023-01-30
Payer: COMMERCIAL

## 2023-01-30 PROBLEM — M54.16 LUMBAR RADICULOPATHY: Status: ACTIVE | Noted: 2022-08-08

## 2023-01-30 NOTE — TELEPHONE ENCOUNTER
Called patient to schedule surgery with   Date of Surgery: 02/23/2023  Approximate arrival time given:  Yes  Location of surgery: [facility]  Discussed COVID-19 Testing: Not Applicable       Additional Comments:       Pt aware a  is needed     Zak Peña on 1/30/2023 at 1:57 PM

## 2023-02-04 LAB — NONINV COLON CA DNA+OCC BLD SCRN STL QL: NEGATIVE

## 2023-02-06 ENCOUNTER — TELEPHONE (OUTPATIENT)
Dept: FAMILY MEDICINE | Facility: CLINIC | Age: 58
End: 2023-02-06

## 2023-02-06 ENCOUNTER — ANCILLARY PROCEDURE (OUTPATIENT)
Dept: CT IMAGING | Facility: CLINIC | Age: 58
End: 2023-02-06
Attending: FAMILY MEDICINE
Payer: COMMERCIAL

## 2023-02-06 ENCOUNTER — ANCILLARY PROCEDURE (OUTPATIENT)
Dept: ULTRASOUND IMAGING | Facility: CLINIC | Age: 58
End: 2023-02-06
Attending: FAMILY MEDICINE
Payer: COMMERCIAL

## 2023-02-06 DIAGNOSIS — Z71.6 ENCOUNTER FOR SMOKING CESSATION COUNSELING: ICD-10-CM

## 2023-02-06 DIAGNOSIS — R10.9 RIGHT FLANK PAIN: ICD-10-CM

## 2023-02-06 PROCEDURE — 71271 CT THORAX LUNG CANCER SCR C-: CPT | Mod: GC | Performed by: RADIOLOGY

## 2023-02-06 PROCEDURE — 76705 ECHO EXAM OF ABDOMEN: CPT | Mod: GC | Performed by: RADIOLOGY

## 2023-02-06 NOTE — TELEPHONE ENCOUNTER
FS,        Order clarification     Who is requesting: Allie called from radiology.    Question regarding ultrasound orders:    US Kidney right and US abdomen limited.    Right kidney is included in the abdomen limited.  Do you still want the right kidney US?      Okay to leave a detailed message?: No at Other phone number:  [phone]*      Thanks,  Elma Chou RN     Thank you for choosing [facility] at [facility].  It's a pleasure to be a part of your healthcare team.  Please let us know if you will need anything --- [phone], press 2 for the nursing staff.  Have a great day!    Ktay LECHUGA & Maggy LECHUGA

## 2023-02-06 NOTE — TELEPHONE ENCOUNTER
I do not want a duplicate test. If the images are already included in the abdominal us, there is no need to do a separate right renal US.     MD Odin

## 2023-02-07 DIAGNOSIS — G47.00 INSOMNIA, UNSPECIFIED TYPE: ICD-10-CM

## 2023-02-07 DIAGNOSIS — J44.9 CHRONIC OBSTRUCTIVE PULMONARY DISEASE, UNSPECIFIED COPD TYPE (H): ICD-10-CM

## 2023-02-08 RX ORDER — TRAZODONE HYDROCHLORIDE 50 MG/1
300 TABLET, FILM COATED ORAL AT BEDTIME
Qty: 90 TABLET | Refills: 1 | Status: SHIPPED | OUTPATIENT
Start: 2023-02-08 | End: 2023-01-01

## 2023-02-08 RX ORDER — ALBUTEROL SULFATE 90 UG/1
2 AEROSOL, METERED RESPIRATORY (INHALATION) EVERY 6 HOURS
Qty: 6.7 G | Refills: 0 | Status: SHIPPED | OUTPATIENT
Start: 2023-02-08 | End: 2023-01-01

## 2023-02-08 NOTE — TELEPHONE ENCOUNTER
"Requested Prescriptions   Pending Prescriptions Disp Refills     traZODone (DESYREL) 50 MG tablet 90 tablet 1     Sig: Take 6 tablets (300 mg) by mouth At Bedtime       Serotonin Modulators Passed - 2/7/2023  4:24 PM        Passed - Recent (12 mo) or future (30 days) visit within the authorizing provider's specialty     Patient has had an office visit with the authorizing provider or a provider within the authorizing providers department within the previous 12 mos or has a future within next 30 days. See \"Patient Info\" tab in inbasket, or \"Choose Columns\" in Meds & Orders section of the refill encounter.              Passed - Medication is active on med list        Passed - Patient is age 18 or older           Prescription approved per [facility] Refill Protocol.    Machelle Oreilly RN  [facility]   "

## 2023-02-08 NOTE — TELEPHONE ENCOUNTER
"Requested Prescriptions   Pending Prescriptions Disp Refills     albuterol (PROAIR HFA/PROVENTIL HFA/VENTOLIN HFA) 108 (90 Base) MCG/ACT inhaler 6.7 g 0     Sig: Inhale 2 puffs into the lungs every 6 hours       Asthma Maintenance Inhalers - Anticholinergics Passed - 2/7/2023  4:28 PM        Passed - Patient is age 12 years or older        Passed - Recent (12 mo) or future (30 days) visit within the authorizing provider's specialty     Patient has had an office visit with the authorizing provider or a provider within the authorizing providers department within the previous 12 mos or has a future within next 30 days. See \"Patient Info\" tab in inbasket, or \"Choose Columns\" in Meds & Orders section of the refill encounter.              Passed - Medication is active on med list       Short-Acting Beta Agonist Inhalers Protocol  Passed - 2/7/2023  4:28 PM        Passed - Patient is age 12 or older        Passed - Recent (12 mo) or future (30 days) visit within the authorizing provider's specialty     Patient has had an office visit with the authorizing provider or a provider within the authorizing providers department within the previous 12 mos or has a future within next 30 days. See \"Patient Info\" tab in inbasket, or \"Choose Columns\" in Meds & Orders section of the refill encounter.              Passed - Medication is active on med list           Prescription approved per [facility] Refill Protocol.    Machelle Oreilly RN  [facility]   "

## 2023-02-13 DIAGNOSIS — J44.9 CHRONIC OBSTRUCTIVE PULMONARY DISEASE, UNSPECIFIED COPD TYPE (H): ICD-10-CM

## 2023-02-14 RX ORDER — ALBUTEROL SULFATE 1.25 MG/3ML
1.25 SOLUTION RESPIRATORY (INHALATION) EVERY 6 HOURS PRN
Qty: 1 ML | Refills: 0 | OUTPATIENT
Start: 2023-02-14

## 2023-02-22 ENCOUNTER — TELEPHONE (OUTPATIENT)
Dept: ANESTHESIOLOGY | Facility: CLINIC | Age: 58
End: 2023-02-22
Payer: COMMERCIAL
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
## 2023-02-27 ENCOUNTER — TELEPHONE (OUTPATIENT)
Dept: ANESTHESIOLOGY | Facility: CLINIC | Age: 58
End: 2023-02-27
Payer: COMMERCIAL

## 2023-02-27 NOTE — TELEPHONE ENCOUNTER
Received voicemail from patient on 2/27 regarding rescheduling procedure with Dr. Lopez.    Routing call to procedure scheduling to follow up and reschedule patient.   __    Daisy Peterson, Senior Perioperative Coordinator, on 2/27/2023

## 2023-02-28 ENCOUNTER — TELEPHONE (OUTPATIENT)
Dept: ANESTHESIOLOGY | Facility: CLINIC | Age: 58
End: 2023-02-28
Payer: COMMERCIAL

## 2023-02-28 NOTE — TELEPHONE ENCOUNTER
Called patient to schedule surgery with Dr. Lopez    Date of Surgery: 3/9    Location of surgery: [facility] Procedure Area    Pre-Op H&P: NA    Additional comments: Spoke with patient, they are aware of above dates, will reach out with any questions           Anna C. Schoenecker on 2/28/2023 at 8:27 AM

## 2023-03-07 NOTE — TELEPHONE ENCOUNTER
Prescription approved per [facility] Refill Protocol.  *1 refill only; patient due for appointment (Follow Up).    Juan KUNZ RN

## 2023-03-09 NOTE — OP NOTE
Patient: Babar Bhatt Age: 57 year old   MRN: [id number] Attending: Dr. Lopez     Date of Visit: March 9, 2023      PAIN MEDICINE CLINIC PROCEDURE NOTE    ATTENDING CLINICIAN:    William Lopez MD    ASSISTANT CLINICIAN:  Jorge Major DO    PREPROCEDURE DIAGNOSES:  1.  Lumbar radiculopathy  2.  Chronic low back pain       PROCEDURE(S) PERFORMED:  1.  Interlaminar lumbar epidural steroid injection (left L5-S1)  2.  Fluoroscopic guidance for the above-named procedure(s)      ANESTHESIA:  Local.    INDICATIONS:  Babar Bhatt is a 57 year old male with a history of  chronic low back pain secondary to bilateral lumbosacral radiculopathy .  The patient stated that the patient was in their usual state of health and denied recent anticoagulant use or recent infections.  Therefore, the plan is to perform above mentioned procedures.     Procedure Details:  The patient was met in the procedure room, where the patient was identified by name, medical record number and date of birth.  All of the patient s last minute questions were answered. Written informed consent was obtained and saved in the electronic medical record, after the risks, benefits, and alternatives were discussed with the patient.      A formal time-out procedure was performed, as per protocol, including patient name, title of procedure, and site of procedure, and all in the room concurred.  Routine monitors were applied.      The patient was placed in the prone position on the procedure room table.  All pressure points were checked and comfortably padded.  Routine monitors were placed.  Vital signs were stable.    A chlorhexidine prep was completed followed by sterile draping per standard procedure.     The AP fluoroscopic view was optimized for approach at  Left L5/S1 interspace.  The skin over the interspace was infiltrated with 4-5 mL of 1% Lidocaine using a 25 gauge, 1.5 inch needle.  A 20-gauge 3-1/2 inch Tuohy needle was advanced under fluoroscopic  guidance with left  paramedial approach until it touched lamina. Mutiple AP and lateral fluoroscopic images at this time  are taken as Tuohy needle was advanced to the epidural space. The epidural space was identified, without evidence of blood, cerebrospinal fluid, or parasthesia throughout. Needle tip placement within the epidural space was further confirmed with 1-2 mL of nonionic contrast agent, with the epidural space visualized in the AP and lateral fluoroscopic view(s) with appropriate spread of the agent with fat vacuolization and no intravascular or intrathecal spread noted. Next, 8 mL of a treatment solution containing 2 mL of preservative free 0.25% bupivacaine, 1 mL of Depo-Medrol 40 mg/mL and 5 mL preservative free normal saline was administered slowly. The needle was withdrawn.      Light pressure was held at the puncture site(s) to prevent ecchymosis and oozing.  The patient's skin was cleansed, and hemostasis was confirmed.  Band-aids were applied to the needle injection site(s).      Condition:    The patient remained awake and alert throughout the procedure.  The patient tolerated the procedure well and was monitored for approximately 15 minutes afterward in the post procedure area.  There were no immediate post procedure complications noted.  The patient was then discharged to home as per protocol.      Pre-procedure pain score: 8/10  Post-procedure pain score: 3/10

## 2023-03-09 NOTE — DISCHARGE INSTRUCTIONS
Home Care Instructions after an Epidural Steroid Pain Injection    A lumbar epidural steroid injection delivers steroid medication directly into the area that may be causing your lower back pain and/or leg pain. A cervical or thoracic epidural steroid injection delivers steroids into the epidural space surrounding spinal nerve roots to help relieve pain in the upper spine/neck.    Activity  -Rest today  -Do not work today  -Resume normal activity tomorrow  -DO NOT shower for 24 hours  -DO NOT remove bandaid for 24 hours    Pain  -You may experience soreness at the injection site for one or two days  -You may use an ice pack for 20 minutes every 2 hours for the first 24 hours  -You may use a heating pad after the first 24 hours  -You may use Tylenol (acetaminophen) every 4 hours or other pain medicines as     directed by your physician    You may experience numbness radiating into your legs or arms (depending on the procedure location). This numbness may last several hours. Until sensation returns to normal; please use caution in walking, climbing stairs, and stepping out of your vehicle, etc.      Common side effects of steroids:  Not everyone will experience corticosteroid side effects. If side effects are experienced, they will gradually subside in the 7-10 day period following an injection. Most common side effects include:  -Flushed face and/or chest  -Feeling of warmth, particularly in the face but could be an overall feeling of warmth  -Increased blood sugar in diabetic patients  -Menstrual irregularities my occur. If taking hormone-based birth control an alternate method of birth control is recommended  -Sleep disturbances and/or mood swings are possible  -Leg cramps    Please contact us if you have:  -Severe pain  -Fever more than 101.5 degrees Fahrenheit  -Signs of infection at the injection site (redness, swelling, or drainage)    FOR PAIN CENTER PATIENTS:  If you have questions, please contact the Pain  Clinic at [phone] Option #1 between the hours of 7:00 am and 3:00 pm Monday through Friday. After office hours you can contact the on call provider by dialing [phone]. If you need immediate attention, we recommend that you go to a hospital emergency room or dial [phone].      FOR PM&R PATIENTS:  For patients seen by the PM and R service, please call [phone]. (Monday through Friday 8a-5p.  After business hours and weekends call [phone] and ask for the PM and R doctor on call). If you need to fax a pain diary to PM&R the fax number is [phone]. If you are unable to fax, uploading to [employer] is encouraged, then send to provider. If you have procedure scheduling questions please call [phone] Option #2.

## 2023-03-10 NOTE — TELEPHONE ENCOUNTER
"Requested Prescriptions   Pending Prescriptions Disp Refills     traZODone (DESYREL) 50 MG tablet 90 tablet 1     Sig: Take 6 tablets (300 mg) by mouth At Bedtime       Serotonin Modulators Passed - 3/8/2023 11:19 AM        Passed - Recent (12 mo) or future (30 days) visit within the authorizing provider's specialty     Patient has had an office visit with the authorizing provider or a provider within the authorizing providers department within the previous 12 mos or has a future within next 30 days. See \"Patient Info\" tab in inbasket, or \"Choose Columns\" in Meds & Orders section of the refill encounter.              Passed - Medication is active on med list        Passed - Patient is age 18 or older           Routing refill request to provider for review/approval because:  Drug interaction warning    Thanks!  Hoang Zhao RN   [facility]    "

## 2023-03-22 NOTE — TELEPHONE ENCOUNTER
[facility] Call Center    Phone Message    May a detailed message be left on voicemail: yes     Reason for Call: Other: Patient is calling to let us know that the procedure that he had done is helping and is working. Please call patient back with any additional questions.     Action Taken: Message routed to:  [facility]    Travel Screening: Not Applicable

## 2023-03-29 NOTE — TELEPHONE ENCOUNTER
Prior Authorization Retail Medication Request    Medication/Dose: budesonide-formoterol (SYMBICORT) 160-4.5 MCG/ACT Inhaler  ICD code (if different than what is on RX):  J44.9  Previously Tried and Failed:    Rationale:      Insurance Name:  [facility]  Insurance ID:  [id number]      Pharmacy Information (if different than what is on RX)  Name:  [id number] - [place], [place] - [address] [facility] [email or web address]  Phone:

## 2023-03-30 NOTE — TELEPHONE ENCOUNTER
No pa needed- Brand Symbicort is preferred and covered by the plan. Pharmacy has received a paid claim for brand.

## 2023-03-30 NOTE — TELEPHONE ENCOUNTER
Central Prior Authorization Team  Phone: [phone]    PA Initiation    Medication: Budesonide-formoterol fumarate   Insurance Company: [employer] - Phone [phone] Fax [phone]  Pharmacy Filling the Rx: [id number] [place], [place] - [address] [facility] [address]  Filling Pharmacy Phone: [phone]  Filling Pharmacy Fax:    Start Date: 3/30/2023

## 2023-06-05 NOTE — PROGRESS NOTES
Assessment & Plan   Babar was seen today for recheck.    Diagnoses and all orders for this visit:    Chronic obstructive pulmonary disease, unspecified COPD type (H)  -     General PFT Lab (Please always keep checked); Future  -     Pulmonary Function Test; Future  -     Adult Pulmonary Medicine Referral; Future    Chronic bilateral thoracic back pain  -     tiZANidine (ZANAFLEX) 4 MG tablet; Take 1 tablet (4 mg) by mouth 3 times daily as needed for muscle spasms  Follow-up with the pain clinic  Benign essential hypertension  -     triamterene-HCTZ (MAXZIDE) 75-50 MG tablet; Take 1 tablet by mouth daily    Morbid obesity (H)  -     Semaglutide-Weight Management (WEGOVY) 0.25 MG/0.5ML pen; Inject 0.25 mg Subcutaneous once a week  -     Semaglutide-Weight Management (WEGOVY) 0.5 MG/0.5ML pen; Inject 0.5 mg Subcutaneous once a week    Chronic pain syndrome  Follow-up with the pain clinic continue with pain tizanidine and gabapentin.    Return to clinic in 2 months for weight management visit.    Nabila Newby MD  [facility]   Babar is a 57 year old, presenting for the following health issues:  RECHECK (Test results)        6/5/2023     1:12 PM   Additional Questions   Roomed by nai lester   Accompanied by n/a         6/5/2023     1:12 PM   Patient Reported Additional Medications   Patient reports taking the following new medications n/a     HPI     57-year-old with chronic lower back pain who presents to the clinic for a follow-up visit.Patient continues to have persistent back pain. He went to see one of the pain specialists Dr. Lopez for Right L3,L4 and L5 medial branch nerve radiofrequency ablation to denervate right L4-L5 and L5-S1 facet joints. Patient also reports occasional left flank pain lasted for 3-4 days and resolved spontaneously.   Patient continues to take gabapentin as prescribed.    Quit smoking this week.    Desires to try medication for weight loss.    Review  "of Systems   Constitutional, HEENT, cardiovascular, pulmonary, gi and gu systems are negative, except as otherwise noted.      Objective    /76 (BP Location: Right arm, Patient Position: Sitting, Cuff Size: Adult Regular)   Pulse 97   Temp 97.2  F (36.2  C) (Temporal)   Resp 14   Ht 1.76 m (5' 9.29\")   Wt 113.4 kg (250 lb)   SpO2 92%   BMI 36.61 kg/m    Body mass index is 36.61 kg/m .  Physical Exam   GENERAL: healthy, alert and no distress  RESP: lungs clear to auscultation - no rales, rhonchi or wheezes  CV: regular rate and rhythm, normal S1 S2, no S3 or S4, no murmur, click or rub, no peripheral edema and peripheral pulses strong  MS: no gross musculoskeletal defects noted, no edema    No results found for any visits on 06/05/23.              "

## 2023-06-05 NOTE — PATIENT INSTRUCTIONS
MEDICATION STARTED AT THIS APPOINTMENT    We are starting a GLP-1 (Glucagon-like Peptide-1) medication. Examples of this medication include Byetta, Wegovy, or Victoza, etc. The medication chosen will depend on insurance coverage, and can be changed to the medication that is covered under your plan. These medications work the same, in that they can help you lose weight and control your blood sugar. One of the ways it works is by slowing down the rate that food leaves your stomach. You feel goldberg and will eat less.  It also helps regulate hormones that can help improve your blood sugars.    Usually short acting insulins or oral agents are stopped or decreased by the doctor at the appointment. Low blood sugars are rare but can happen if patients are on insulin or other oral agents. If you notice consistent low sugars or high sugars, your medication may need to be adjusted after your appointment. If this is the case, please call RN and provide her your blood sugar record from the last 3-4 days. The RN will get in touch with the doctor and call you back/[employer] message with recommendations. We tolerate high sugars for a bit, so if sugars are running 180-200, this is ok. As weight starts dropping the blood sugars should too. If readings are consistently over 200 for 1-2 weeks, then you should call the doctor/nurse.    Types of GLP-1 medications include:    Saxenda: Starting dose is 0.6 mg for a week, then 1.2 mg for a week, then 1.8 mg for 1 week, then 2.4 mg for 1 week and then 3 mg daily thereafter (if prescribed by doctor). This medication is given daily. Pen needles need to be ordered also.    Victoza: Starting dose is 0.6 mg for a week, then 1.2 mg for a week, and then 1.8 mg thereafter (if prescribed by doctor). This medication is given daily. Pen needles need to be ordered also.    Wegovy: Starting dose is 0.25 mg once weekly for 4 weeks, and then increase to 0.5 mg weekly. If after 4 weeks of being on 0.5 mg  weekly dosing and still wanting additional weight loss and/or blood sugar benefits, check with your doctor to see if they want to increase the dose to 1 mg weekly.     Side effects of GLP- Medications include: The most common side effects are all GI related and consist of: nausea, constipation, diarrhea, burping, or gassiness. Patients are advised to eat slowly and less, and nausea typically passes if people can stick it out.     The risk of pancreatitis (inflammation of the pancreas) has been associated with this type of medication, but is very rare.  If you have had pancreatitis in the past, this medication may not be for you. Please let us know about any past history of pancreas problems.      Symptoms of pancreatitis include: Pain in your upper stomach area which  may travel to your back and be worse after eating. Your stomach area may be tender to the touch.  You may have vomiting or nausea and/or have a fever. If you should develop any of these symptoms, stop the medication and contact your primary care doctor. They will do a blood test to check for pancreatitis.         There is a small chance you may have some low blood sugar after taking the medication.   The signs of low blood sugar are:  Weakness  Shaky   Hungry  Sweating  Confusion      See below for ways to treat low blood sugar without adding in lots of extra calories.      Treating Low Blood Sugar    If you have symptoms of low blood sugar (sweating, shaking, dizzy, confused) eat 15 grams of carbs and wait 15 minutes:    Glucose Tabs are best for sugars under 70 -  Dex4 or BD Glucose tablets are good, you will need to take 3-4 of these to equal 15 grams.     One small box of raisins  4 oz fruit juice box or   cup fruit juice  1 small apple  1 small banana    cup canned fruit in water    English muffin or a slice of bread with jelly   1 low fat frozen waffle with sugar-free syrup    cup cottage cheese with   cup frozen or fresh blueberries  1 cup skim  or low-fat milk    cup whole grain cereal  4-6 crackers such as Triscuits      This medication is usually covered by insurance for patients with a diagnosis of Type 2 Diabetes. Depending on insurance coverage, the medication may be changed to a different formulary, but they all work in the same way. Sometimes a prior authorization is required, which may take up to 1-2 weeks for an insurance company to make a decision if they will cover the medication. Please be patient, you will be notified either way.

## 2023-06-12 NOTE — LETTER
2023    INSURER: Payor: [employer] / Plan: [employer] / Product Type: Indemnity /  Re: Prior Authorization Request  Patient: Babar Bhatt  Policy ID#:  [id number]  : 1965      To Whom it May Concern:  Subject: Request for Prior Authorization for Wegovy (semaglutide) for Weight Reduction    I am writing to request prior authorization for Wegovy (semaglutide) for the purpose of weight reduction for the Babar Bhatt. As the healthcare provider for the patient, I believe that Wegovy is a medically necessary treatment option that will provide significant benefits for the patient's overall health and well-being. Patient is not a candidate for Phentermine due to his history of COPD, Chronic pain and intermittent use of opioids for chronic pain.       I am outlining below the benefits of weight reduction and how Wegovy can contribute to achieving these outcomes:    1. Improved Metabolic Health: Weight reduction is associated with improvements in various metabolic parameters, including reduced insulin resistance, improved glucose control, and decreased risk of developing type 2 diabetes. Wegovy, as a GLP-1 receptor agonist, has been shown to improve glycemic control in individuals with obesity or overweight, thus contributing to metabolic health improvement.    2. Cardiovascular Risk Reduction: Weight loss has a positive impact on cardiovascular health by decreasing blood pressure, improving lipid profile, and reducing the risk of heart disease. Clinical studies have demonstrated that Wegovy, when used in conjunction with lifestyle interventions, leads to significant reductions in blood pressure and improvements in lipid levels, thereby lowering the risk of cardiovascular events.    3. Resolution of Obesity-Related Comorbidities: Obesity is associated with various comorbid conditions, such as hypertension, dyslipidemia, obstructive sleep apnea, and non-alcoholic fatty liver disease.  Weight reduction achieved with Wegovy has been shown to contribute to the resolution or improvement of these comorbidities, leading to enhanced overall health and reduced healthcare costs.    4. Enhanced Quality of Life: Excess weight can have a significant impact on an individual's quality of life, including physical functioning, mental well-being, and social interactions. Weight loss with Wegovy has been shown to improve physical mobility, reduce fatigue, enhance self-esteem, and positively impact psychological health, thereby improving the patient's overall quality of life.    Considering the aforementioned benefits, I strongly believe that Wegovy is an appropriate and medically necessary treatment option for the patient in question. I have discussed the potential risks and benefits of Wegovy with the patient, and we have agreed that this medication, in conjunction with a reduced-calorie diet and increased physical activity, represents the most suitable approach for their weight reduction journey.    I firmly believe that this therapy is clinically appropriate and that Babar Bhatt would benefit from improved quality of life if allowed the opportunity to receive this treatment.  Please contact me at Dept: [phone] if you require additional information to ensure the prompt approval for coverage.    Please send your written decision to me at this address:  [facility]  [address] [place] [address], [address]  [facility] [address] [phone]  Dept: [phone]      Sincerely,    Nabila Newby MD

## 2023-06-12 NOTE — TELEPHONE ENCOUNTER
Prior Authorization Retail Medication Request    Medication/Dose: Semaglutide-Weight Management (WEGOVY) 0.5 MG/0.5ML pen  ICD code (if different than what is on RX):  Morbid obesity (H) [E66.01]   Previously Tried and Failed:  unknown  Rationale:  unknown    Insurance Name:  [facility]  Insurance ID:  [id number]

## 2023-06-15 NOTE — TELEPHONE ENCOUNTER
FS,    Please see below medication was denied for patient     If you would like to start appeal please write Letter of Medical Necessity ID number [id number] under the Letter tab and send back to [facility] PA MED pool.     Thank you,     Elma Chou RN

## 2023-06-15 NOTE — TELEPHONE ENCOUNTER
PA Initiation    Medication: SEMAGLUTIDE-WEIGHT MANAGEMENT 0.5 MG/0.5ML SC SOAJ  Insurance Company: [employer] - Phone [phone] Fax [phone]  Pharmacy Filling the Rx: [id number] [place] [address] [facility] [address]  Filling Pharmacy Phone: [phone]  Filling Pharmacy Fax: [phone]  Start Date: 6/15/2023

## 2023-06-15 NOTE — TELEPHONE ENCOUNTER
PRIOR AUTHORIZATION DENIED    Medication: SEMAGLUTIDE-WEIGHT MANAGEMENT 0.5 MG/0.5ML SC SOAJ  Insurance Company: [employer] - Phone [phone] Fax [phone]  Denial Date: 6/15/2023  Denial Rational:     Appeal Information: Fax LMN to [employer]  Patient Notified: No

## 2023-06-20 NOTE — TELEPHONE ENCOUNTER
Routing refill request to provider for review/approval because:  Protocol not passed.  Luh PEREZ RN

## 2023-07-17 NOTE — TELEPHONE ENCOUNTER
Left Voicemail (1st Attempt) for the patient to call back and schedule the following:    Appointment type: New   Provider: [facility]   Return date: 09/25/2023   Specialty phone number: [phone]   Additional appointment(s) needed: CXR, PFT   Additonal Notes: 07/17/2023 LVM to schedule CXR

## 2023-07-17 NOTE — CONFIDENTIAL NOTE
RECORDS RECEIVED FROM: internal    DATE RECEIVED: 9.25.23    NOTES STATUS DETAILS   OFFICE NOTE from referring provider internal  Nabila Newby MD   OFFICE NOTE from other specialist     DISCHARGE SUMMARY from hospital     DISCHARGE REPORT from the ER     MEDICATION LIST internal     IMAGING  (NEED IMAGES AND REPORTS)     CT SCAN internal  2.6.23, 7.21.21   CHEST XRAY (CXR) internal  11.3.21   TESTS     PULMONARY FUNCTION TESTING (PFT) internal  11.3.21  Scheduled 9.25.23        Action 7/17/23 sv    Action Taken Message sent to nurse pool to place CXR order

## 2023-08-01 NOTE — TELEPHONE ENCOUNTER
Patient called to schedule procedure with Dr. Lopez. Patient last injection was on 03/09/23 and like to have the same injection again. Please place a new order for this patient.          Elizabeth Bunn MA on 8/1/2023 at 11:38 AM

## 2023-08-07 NOTE — TELEPHONE ENCOUNTER
Patient is scheduled for surgery with Dr. Lopez    Spoke with: patient    Date of Surgery: 08/31/23    Location: [facility]    Informed patient they will need an adult  yes    Additional comments: Patient is aware of date and time of the procedure.        Elizabeth Bunn MA on 8/7/2023 at 8:54 AM

## 2023-08-16 NOTE — TELEPHONE ENCOUNTER
Called and LVM To call back and schedule a Telephone visit  [facility] Unit Coordinator

## 2023-08-16 NOTE — TELEPHONE ENCOUNTER
FS,  Patient calling to request refill of Ambien.  States he has been trying to get ahold of therapist all week.  Has not been returning his phone calls and has not been able to get refill on medication.  Wondering if you are able to refill for him.  Please advise.  Luh PEREZ RN

## 2023-08-17 NOTE — TELEPHONE ENCOUNTER
Patient is scheduled 8/23/23  Angella Alcazar  [facility] Unit Coordinator

## 2023-08-23 NOTE — PROGRESS NOTES
Babar is a 57 year old who is being evaluated via a billable telephone visit.      What phone number would you like to be contacted at? [phone]   How would you like to obtain your AVS? Mail a copy  Distant Location (provider location):  On-site    Assessment & Plan     Babar was seen today for refill request.    Diagnoses and all orders for this visit:    Insomnia, unspecified type  -     traZODone (DESYREL) 50 MG tablet; Take 6 tablets (300 mg) by mouth At Bedtime  -     zolpidem (AMBIEN) 10 MG tablet; Take 1 tablet (10 mg) by mouth nightly as needed for sleep  -     zolpidem (AMBIEN) 10 MG tablet; Take 1 tablet (10 mg) by mouth nightly as needed for sleep  -     zolpidem (AMBIEN) 10 MG tablet; Take 1 tablet (10 mg) by mouth nightly as needed for sleep    Other orders  -     REVIEW OF HEALTH MAINTENANCE PROTOCOL ORDERS        Return in about 3 months (around 11/23/2023) for Follow-up for Mental Health.           Nabila Newby MD  [facility]   Babar is a 57 year old, presenting for the following health issues:  Refill Request        6/5/2023     1:12 PM   Additional Questions   Roomed by nai lester   Accompanied by n/a       History of Present Illness       Reason for visit:  Medication Refils    He eats 2-3 servings of fruits and vegetables daily.He consumes 1 sweetened beverage(s) daily.He exercises with enough effort to increase his heart rate 60 or more minutes per day.  He exercises with enough effort to increase his heart rate 5 days per week.   He is taking medications regularly.       Medication Followup of Ambian  Taking Medication as prescribed: yes  Side Effects:  None  Medication Helping Symptoms:  yes  Medication Followup of Trazodone   Taking Medication as prescribed: yes  Side Effects:  None  Medication Helping Symptoms:  yes      Review of Systems   Constitutional, HEENT, cardiovascular, pulmonary, gi and gu systems are negative, except as otherwise  noted.      Objective           Vitals:  No vitals were obtained today due to virtual visit.    Physical Exam   healthy, alert, and no distress  PSYCH: Alert and oriented times 3; coherent speech, normal   rate and volume, able to articulate logical thoughts, able   to abstract reason, no tangential thoughts, no hallucinations   or delusions  His affect is normal  RESP: No cough, no audible wheezing, able to talk in full sentences  Remainder of exam unable to be completed due to telephone visits          Phone call duration: 14 minutes

## 2023-08-31 NOTE — DISCHARGE INSTRUCTIONS
Home Care Instructions after an Epidural Steroid Pain Injection    A lumbar epidural steroid injection delivers steroid medication directly into the area that may be causing your lower back pain and/or leg pain. A cervical or thoracic epidural steroid injection delivers steroids into the epidural space surrounding spinal nerve roots to help relieve pain in the upper spine/neck.    Activity  -Rest today  -Do not work today  -Resume normal activity tomorrow  -DO NOT shower for 24 hours  -DO NOT remove bandaid for 24 hours    Pain  -You may experience soreness at the injection site for one or two days  -You may use an ice pack for 20 minutes every 2 hours for the first 24 hours  -You may use a heating pad after the first 24 hours  -You may use Tylenol (acetaminophen) every 4 hours or other pain medicines as     directed by your physician    You may experience numbness radiating into your legs or arms (depending on the procedure location). This numbness may last several hours. Until sensation returns to normal; please use caution in walking, climbing stairs, and stepping out of your vehicle, etc.    Common side effects of steroids:  Not everyone will experience corticosteroid side effects. If side effects are experienced, they will gradually subside in the 7-10 day period following an injection. Most common side effects include:  -Flushed face and/or chest  -Feeling of warmth, particularly in the face but could be an overall feeling of warmth  -Increased blood sugar in diabetic patients  -Menstrual irregularities my occur. If taking hormone-based birth control an alternate method of birth control is recommended  -Sleep disturbances and/or mood swings are possible  -Leg cramps    Please contact us if you have:  -Severe pain  -Fever more than 101.5 degrees Fahrenheit  -Signs of infection at the injection site (redness, swelling, or drainage)    FOR PAIN CENTER PATIENTS:  If you have questions, please contact the Pain  Clinic at [phone] Option #1 between the hours of 7:00 am and 3:00 pm Monday through Friday. After office hours you can contact the on call provider by dialing [phone]. If you need immediate attention, we recommend that you go to a hospital emergency room or dial [phone].

## 2023-08-31 NOTE — OP NOTE
Patient: Babar Bhatt Age: 57 year old   MRN: [id number] Attending: Dr. Lopez           PAIN MEDICINE CLINIC PROCEDURE NOTE     ATTENDING CLINICIAN:    William Lopez MD          PREPROCEDURE DIAGNOSES:  1.  Lumbar radiculopathy  2.  Chronic low back pain         PROCEDURE(S) PERFORMED:  1.  Interlaminar lumbar epidural steroid injection (left L5-S1)  2.  Fluoroscopic guidance for the above-named procedure(s)        ANESTHESIA:  Local.     INDICATIONS:  Babar Bhatt is a 57 year old male with a history of  chronic low back pain secondary to bilateral lumbosacral radiculopathy .  The patient stated that the patient was in their usual state of health and denied recent anticoagulant use or recent infections.  Therefore, the plan is to perform above mentioned procedures.      Procedure Details:  The patient was met in the procedure room, where the patient was identified by name, medical record number and date of birth.  All of the patient s last minute questions were answered. Written informed consent was obtained and saved in the electronic medical record, after the risks, benefits, and alternatives were discussed with the patient.       A formal time-out procedure was performed, as per protocol, including patient name, title of procedure, and site of procedure, and all in the room concurred.  Routine monitors were applied.       The patient was placed in the prone position on the procedure room table.  All pressure points were checked and comfortably padded.  Routine monitors were placed.  Vital signs were stable.     A chlorhexidine prep was completed followed by sterile draping per standard procedure.      The AP fluoroscopic view was optimized for approach at  Left L5/S1 interspace.  The skin over the interspace was infiltrated with 4-5 mL of 1% Lidocaine using a 25 gauge, 1.5 inch needle.  A 20-gauge 3-1/2 inch Tuohy needle was advanced under fluoroscopic guidance with left  paramedial approach until it touched  lamina. Mutiple AP and lateral fluoroscopic images at this time  are taken as Tuohy needle was advanced to the epidural space. The epidural space was identified, without evidence of blood, cerebrospinal fluid, or parasthesia throughout. Needle tip placement within the epidural space was further confirmed with 1-2 mL of nonionic contrast agent, with the epidural space visualized in the AP and lateral fluoroscopic view(s) with appropriate spread of the agent with fat vacuolization and no intravascular or intrathecal spread noted. Next, 8 mL of a treatment solution containing 2 mL of preservative free 0.25% bupivacaine, 1 mL of Depo-Medrol 40 mg/mL and 5 mL preservative free normal saline was administered slowly. The needle was withdrawn.        Light pressure was held at the puncture site(s) to prevent ecchymosis and oozing.  The patient's skin was cleansed, and hemostasis was confirmed.  Band-aids were applied to the needle injection site(s).       Condition:     The patient remained awake and alert throughout the procedure.  The patient tolerated the procedure well and was monitored for approximately 15 minutes afterward in the post procedure area.  There were no immediate post procedure complications noted.  The patient was then discharged to home as per protocol.       Pre-procedure pain score: 7/10  Post-procedure pain score: 0/10

## 2023-10-05 NOTE — TELEPHONE ENCOUNTER
The patient called trying to reach Dr Lopez's office.   transferred him to the appropriate department.

## 2023-10-05 NOTE — TELEPHONE ENCOUNTER
[facility] Call Center    Phone Message    May a detailed message be left on voicemail: yes     Reason for Call: Medication Refill Request    Has the patient contacted the pharmacy for the refill? Yes   Name of medication being requested: tiZANidine (ZANAFLEX) 4 MG tablet   Provider who prescribed the medication: Jessica  Pharmacy: [id number] - [place], [place] - [address] AT [facility] [address]    Date medication is needed: ASAP today patient is out of medication     Action Taken: Other: Pain    Travel Screening: Not Applicable

## 2023-10-05 NOTE — TELEPHONE ENCOUNTER
Prescription for Tizanidine is being prescribed by pt's PCP.     Pt was last seen by Dr. Lopez for Office visit on 1/26/23.     Most recent prescription for Tizanidine was sent to pt's pharmacy on 6/5/23 for #90 with 3 refills.     Pt was informed on VM that they should follow up with their PCP for refill of this medication. Pt was encouraged to call the clinic back if they had additional questions for Dr. Lopez's team.   Phone number provided.     Yuli Harris LPN

## 2023-10-19 NOTE — CONFIDENTIAL NOTE
RECORDS RECEIVED FROM: internal    DATE RECEIVED: 9.25.23    NOTES STATUS DETAILS   OFFICE NOTE from referring provider internal  Nabila Newby MD   OFFICE NOTE from other specialist       DISCHARGE SUMMARY from hospital       DISCHARGE REPORT from the ER       MEDICATION LIST internal      IMAGING  (NEED IMAGES AND REPORTS)       CT SCAN internal  2.6.23, 7.21.21   CHEST XRAY (CXR) internal  11.3.21  Scheduled 11.20.23    TESTS       PULMONARY FUNCTION TESTING (PFT) internal  11.3.21  Scheduled 11.20.23

## 2023-11-15 NOTE — TELEPHONE ENCOUNTER
Patient called back.   Was told there are no refills on file per pharmacy.   Please advise.   Thanks!  Renetta RIVERA

## 2023-11-20 NOTE — TELEPHONE ENCOUNTER
Patient calling in a refill request for     Disp Refills Start End LIZBETH   zolpidem (AMBIEN) 10 MG tablet 30 tablet 0 10/22/2023  No   Sig - Route: Take 1 tablet (10 mg) by mouth nightly as needed for sleep - Oral     Please send to [facility] #[id number] - [place], [place] - [address] AT [facility] [address] Coordinator

## 2023-11-20 NOTE — PROGRESS NOTES
Pulmonary Clinic  New Patient Evaluation    Name: Babar Bhatt MRN: [id number]     Age: 58 year old   YOB: 1965             HPI:   CC: COPD    Babar Bhatt is a 58 year old male with H/O HTN, HL, insomnia, chronic pain syndrome who presents today to the pulmonary clinic for follow-up of COPD.    Patient has been diagnosed with COPD on PFTs since 2017 which also showed bronchodilator reversibility.  He was last seen in this pulmonary clinic in November 2021 and was started on Symbicort.    Since then, he states that his shortness of breath has gradually worsened with exertion, especially over the last few months.  Currently, he can only walk couple of blocks and sometimes gets short of breath with his routine activities.  He works as a  and states that sometimes he has to take a break from his daily activities due to shortness of breath.  He endorses occasional episodes of coughing fits which is also associated with some lightheadedness.  He quit smoking 3 weeks ago but a 40-pack-year history before that.  He has intermittent wheezing right now.  Does not have any hospitalizations for COPD exacerbations in the last 1 year.  Currently, he uses Symbicort and albuterol almost twice daily on average.    Exposure History:   Tobacco: 40 pack year smoking history. Quit 3 weeks ago  Other inhaled substances (Vaping, hookah. Marijuana): marijuana every day   Occupation:   Pets: dog, cat  Allergies:  None  Hobbies: None  Travel: None  Home: None  GERD: present, significant         Past Medical History:     Past Medical History:   Diagnosis Date    Chronic obstructive pulmonary disease, unspecified COPD type (H) 4/28/2021    Depressive disorder     Heart murmur     Hypertension     Lumbar spondylosis 9/23/2021             Past Surgical History:      Past Surgical History:   Procedure Laterality Date    DESTRUCTION OF PARAVERTEBRAL FACET LUMBAR / SACRAL ADDITIONAL Right 10/20/2022     Procedure: Right lumbar 3, lumbar 4, lumbar 5 medial branch nerve radiofrequency ablation to denervate right L4-L5 and L5-S1 facet joints;  Surgeon: William Lopez MD;  Location: [facility] OR    INJECT BLOCK MEDIAL BRANCH CERVICAL/THORACIC/LUMBAR Bilateral 7/14/2022    Procedure: Bilateral diagnostic lumbar 3, lumbar 4, lumbar 5 medial branch nerve block with a goal of denervating L4-5 and L5-S1 facet joint (first block);  Surgeon: William Lopez MD;  Location: [facility] OR    INJECT BLOCK MEDIAL BRANCH CERVICAL/THORACIC/LUMBAR Bilateral 8/11/2022    Procedure: Bilateral lumbar 3, lumbar 4, lumbar 5 diagnostic  medial branch nerve block to target bilateral L3-L4 and L5-S1 facet joints (second block);  Surgeon: William Lopez MD;  Location: [facility] OR    INJECT EPIDURAL LUMBAR Left 3/9/2023    Procedure: INJECTION, SPINE, LUMBAR, EPIDURAL (left L5-S1);  Surgeon: William Lopez MD;  Location: [facility] OR    INJECT EPIDURAL LUMBAR Left 8/31/2023    Procedure: INJECTION, SPINE, LUMBAR, EPIDURAL (left L5-S1;  Surgeon: William Lopez MD;  Location: [facility] OR    INJECT FACET JOINT Bilateral 2/10/2022    Procedure: Bilateral lumbar L4-L5, L5-S1 intra-articular facet joint injection under x-ray guidance;  Surgeon: Wliliam Lopez MD;  Location: [facility] OR    INJECT PARAVERTEBRAL FACET JOINT LUMBAR / SACRAL FIRST Bilateral 10/7/2021    Procedure: Bilateral lumbar 4/5 and lumbar 5/sacral1 facet joint intraarticular injection with steroid medication;  Surgeon: William Lopez MD;  Location: [facility] OR    INJECT PARAVERTEBRAL FACET JOINT LUMBAR / SACRAL FIRST Bilateral 5/26/2022    Procedure: Bilateral lumbar L4-L5, L5-S1 intra-articular facet joint injection under x-ray guidance;  Surgeon: William Lopez MD;  Location: [facility] OR             Social History:     Social History     Socioeconomic History    Marital status: Single     Spouse name: Not on file    Number of children: Not on file    Years of education: Not on file    Highest education  level: Not on file   Occupational History    Not on file   Tobacco Use    Smoking status: Some Days     Packs/day: .5     Types: Cigarettes     Start date: 10/7/1981    Smokeless tobacco: Never    Tobacco comments:     Recently quit - 2 weeks (08/23/2023)    Substance and Sexual Activity    Alcohol use: Yes     Comment: occasionally    Drug use: Yes     Types: Marijuana    Sexual activity: Yes     Partners: Female   Other Topics Concern    Not on file   Social History Narrative    Not on file     Social Determinants of Health     Financial Resource Strain: Not on file   Food Insecurity: Not on file   Transportation Needs: Not on file   Physical Activity: Not on file   Stress: Not on file   Social Connections: Not on file   Interpersonal Safety: Not on file   Housing Stability: Not on file            Family History:     Family History   Problem Relation Age of Onset    Hypertension Mother     Diabetes Mother     Diabetes Father     Diabetes Sister              Immunizations:     Immunization History   Administered Date(s) Administered    COVID-19 MONOVALENT 12+ (Pfizer) 04/20/2021, 05/15/2021    Pneumococcal 20 valent Conjugate (Prevnar 20) 01/09/2023    TDAP Vaccine (Boostrix) 05/01/2016    Td (Adult), Adsorbed 04/16/2002             Allergies:   No Known Allergies          Medications:   albuterol (ACCUNEB) 1.25 MG/3ML neb solution, Take 1 vial (1.25 mg) by nebulization every 6 hours as needed for shortness of breath or wheezing  albuterol (PROAIR HFA/PROVENTIL HFA/VENTOLIN HFA) 108 (90 Base) MCG/ACT inhaler, Inhale 2 puffs into the lungs every 6 hours  budesonide-formoterol (SYMBICORT) 160-4.5 MCG/ACT Inhaler, Inhale 2 puffs into the lungs 2 times daily  buPROPion (WELLBUTRIN XL) 300 MG 24 hr tablet,   citalopram (CELEXA) 40 MG tablet, Take 1 tablet (40 mg) by mouth daily (Patient not taking: Reported on 8/31/2023)  gabapentin (NEURONTIN) 300 MG capsule, Take 1 capsule (300 mg) by mouth 4 times daily for 30 days  "TAKE 1 CAPSULE BY MOUTH FOUR TIMES DAILY  rosuvastatin (CRESTOR) 40 MG tablet, Take 0.5 tablets (20 mg) by mouth daily  Semaglutide-Weight Management (WEGOVY) 0.25 MG/0.5ML pen, Inject 0.25 mg Subcutaneous once a week (Patient not taking: Reported on 8/31/2023)  Semaglutide-Weight Management (WEGOVY) 0.5 MG/0.5ML pen, Inject 0.5 mg Subcutaneous once a week (Patient not taking: Reported on 8/31/2023)  tiZANidine (ZANAFLEX) 4 MG tablet, Take 1 tablet (4 mg) by mouth 3 times daily as needed for muscle spasms  traZODone (DESYREL) 50 MG tablet, Take 6 tablets (300 mg) by mouth At Bedtime  triamterene-HCTZ (MAXZIDE) 75-50 MG tablet, Take 1 tablet by mouth daily  zolpidem (AMBIEN) 10 MG tablet, Take 1 tablet (10 mg) by mouth nightly as needed for sleep    No current facility-administered medications on file prior to visit.           Review of Systems:     Review of Systems   Constitutional:  Negative for chills, fever, malaise/fatigue and weight loss.   HENT:  Negative for ear discharge, ear pain, hearing loss, nosebleeds and tinnitus.    Eyes:  Negative for blurred vision, double vision and photophobia.   Respiratory:  Positive for cough and wheezing.    Cardiovascular:  Negative for chest pain, palpitations and orthopnea.   Gastrointestinal:  Positive for heartburn. Negative for abdominal pain and diarrhea.   Genitourinary:  Negative for dysuria, frequency and urgency.   Musculoskeletal:  Positive for back pain. Negative for myalgias and neck pain.   Skin:  Negative for itching and rash.   Neurological:  Positive for dizziness. Negative for tremors, focal weakness and headaches.   Endo/Heme/Allergies:  Negative for polydipsia. Does not bruise/bleed easily.   Psychiatric/Behavioral:  Negative for depression and suicidal ideas.               Exam:   BP (!) 143/89   Pulse 90   Resp 17   Ht 1.761 m (5' 9.33\")   Wt 112 kg (247 lb)   SpO2 94%   BMI 36.13 kg/m      Physical Exam  Constitutional:       Appearance: Normal " appearance. He is obese.   HENT:      Head: Normocephalic and atraumatic.      Nose: Nose normal.   Eyes:      Pupils: Pupils are equal, round, and reactive to light.   Cardiovascular:      Rate and Rhythm: Normal rate and regular rhythm.   Pulmonary:      Effort: Pulmonary effort is normal.      Breath sounds: Normal breath sounds.   Abdominal:      General: Bowel sounds are normal.      Palpations: Abdomen is soft.   Musculoskeletal:         General: Normal range of motion.   Skin:     General: Skin is warm and dry.   Neurological:      General: No focal deficit present.      Mental Status: He is alert.   Psychiatric:         Mood and Affect: Mood normal.         Behavior: Behavior normal.       Fingernails without clubbing         Data:     PFT on 7/28/2017        PFT on 11/20/2023 -           All studies listed here were independently reviewed and interpreted by me today.          Assessment and Plan:     58/M with PMH of HTN, HL presents to the pulmonary clinic today for COPD/asthma overlap syndrome.    1.  COPD/asthma overlap syndrome.  2.  Pulmonary nodules  3.  GERD    - Patient has progression of asthma on his current inhaler therapy.  We will step it up to triple inhaler therapy to Trelegy.  Patient counseled on stopping Symbicort and to use Trelegy daily.  - Commended him on smoking cessation.  Encouraged to continue that as well as try to stop marijuana smoking.  He is currently working on it.  - Check a 6-minute walk test.  He could not do it today as he has to go back to work, but will do it before the next visit.  - Does not have frequent exacerbations currently is not a candidate for azithromycin or PDE 4 inhibitor.  - Pulmonary rehab referral.  - CT chest low-dose noncontrast to follow-up on his pulmonary nodules as he recently quit 3 weeks ago.  That would be in February 2024.  - Continue follow-up with sleep medicine for his AKI.  - Patient is not interested in vaccinations including COVID-19,  influenza or RSV.      Return to clinic: 3 months    I personally spent 60 minutes on the date of the encounter doing chart review, history and exam, documentation and further activities per the note.         MARKO Good   of Medicine  [facility]  Pulmonary, Allergy, Critical Care and Sleep Medicine  Pager - [phone]     The above note was dictated using voice recognition software and may include typographical errors. Please contact the author for any clarifications.

## 2023-11-20 NOTE — PROGRESS NOTES
Babar Bhatt comes into clinic today at the request of Dr. Douglas Shipman Ordering Provider for PFT      This service provided today was under the supervising provider of the day EDER Shipman, who was available if needed.    Swapnil Rea, RRT

## 2023-11-20 NOTE — NURSING NOTE
Chief Complaint   Patient presents with    Consult     New pulmonary COPD    Medications reviewed and vital signs taken.   Tobin Ryan CMA

## 2023-11-20 NOTE — TELEPHONE ENCOUNTER
Note from 8/23/23 VV:    Return in about 3 months (around 11/23/2023) for Follow-up for Mental Health.        VM left asking patient to call back.  Can be a VV.    Elma Chou RN

## 2023-11-20 NOTE — LETTER
11/20/2023         RE: Babar Bhatt  [address] [facility] [address]        Dear Colleague,    Thank you for referring your patient, Babar Bhatt, to the [facility] [place]. Please see a copy of my visit note below.              Pulmonary Clinic  New Patient Evaluation    Name: Babar Bhatt MRN: [id number]     Age: 58 year old   YOB: 1965             HPI:   CC: COPD    Babar Bhatt is a 58 year old male with H/O HTN, HL, insomnia, chronic pain syndrome who presents today to the pulmonary clinic for follow-up of COPD.    Patient has been diagnosed with COPD on PFTs since 2017 which also showed bronchodilator reversibility.  He was last seen in this pulmonary clinic in November 2021 and was started on Symbicort.    Since then, he states that his shortness of breath has gradually worsened with exertion, especially over the last few months.  Currently, he can only walk couple of blocks and sometimes gets short of breath with his routine activities.  He works as a  and states that sometimes he has to take a break from his daily activities due to shortness of breath.  He endorses occasional episodes of coughing fits which is also associated with some lightheadedness.  He quit smoking 3 weeks ago but a 40-pack-year history before that.  He has intermittent wheezing right now.  Does not have any hospitalizations for COPD exacerbations in the last 1 year.  Currently, he uses Symbicort and albuterol almost twice daily on average.    Exposure History:   Tobacco: 40 pack year smoking history. Quit 3 weeks ago  Other inhaled substances (Vaping, hookah. Marijuana): marijuana every day   Occupation:   Pets: dog, cat  Allergies:  None  Hobbies: None  Travel: None  Home: None  GERD: present, significant         Past Medical History:     Past Medical History:   Diagnosis Date    Chronic obstructive pulmonary disease, unspecified COPD  type (H) 4/28/2021    Depressive disorder     Heart murmur     Hypertension     Lumbar spondylosis 9/23/2021             Past Surgical History:      Past Surgical History:   Procedure Laterality Date    DESTRUCTION OF PARAVERTEBRAL FACET LUMBAR / SACRAL ADDITIONAL Right 10/20/2022    Procedure: Right lumbar 3, lumbar 4, lumbar 5 medial branch nerve radiofrequency ablation to denervate right L4-L5 and L5-S1 facet joints;  Surgeon: William Lopez MD;  Location: [facility] OR    INJECT BLOCK MEDIAL BRANCH CERVICAL/THORACIC/LUMBAR Bilateral 7/14/2022    Procedure: Bilateral diagnostic lumbar 3, lumbar 4, lumbar 5 medial branch nerve block with a goal of denervating L4-5 and L5-S1 facet joint (first block);  Surgeon: William Lopez MD;  Location: [facility] OR    INJECT BLOCK MEDIAL BRANCH CERVICAL/THORACIC/LUMBAR Bilateral 8/11/2022    Procedure: Bilateral lumbar 3, lumbar 4, lumbar 5 diagnostic  medial branch nerve block to target bilateral L3-L4 and L5-S1 facet joints (second block);  Surgeon: William Lopez MD;  Location: [facility] OR    INJECT EPIDURAL LUMBAR Left 3/9/2023    Procedure: INJECTION, SPINE, LUMBAR, EPIDURAL (left L5-S1);  Surgeon: William Lopez MD;  Location: [facility] OR    INJECT EPIDURAL LUMBAR Left 8/31/2023    Procedure: INJECTION, SPINE, LUMBAR, EPIDURAL (left L5-S1;  Surgeon: William Lopez MD;  Location: [facility] OR    INJECT FACET JOINT Bilateral 2/10/2022    Procedure: Bilateral lumbar L4-L5, L5-S1 intra-articular facet joint injection under x-ray guidance;  Surgeon: William Lopez MD;  Location: [facility] OR    INJECT PARAVERTEBRAL FACET JOINT LUMBAR / SACRAL FIRST Bilateral 10/7/2021    Procedure: Bilateral lumbar 4/5 and lumbar 5/sacral1 facet joint intraarticular injection with steroid medication;  Surgeon: William Lopez MD;  Location: [facility] OR    INJECT PARAVERTEBRAL FACET JOINT LUMBAR / SACRAL FIRST Bilateral 5/26/2022    Procedure: Bilateral lumbar L4-L5, L5-S1 intra-articular facet joint injection under  x-ray guidance;  Surgeon: William Lopez MD;  Location: [facility] OR             Social History:     Social History     Socioeconomic History    Marital status: Single     Spouse name: Not on file    Number of children: Not on file    Years of education: Not on file    Highest education level: Not on file   Occupational History    Not on file   Tobacco Use    Smoking status: Some Days     Packs/day: .5     Types: Cigarettes     Start date: 10/7/1981    Smokeless tobacco: Never    Tobacco comments:     Recently quit - 2 weeks (08/23/2023)    Substance and Sexual Activity    Alcohol use: Yes     Comment: occasionally    Drug use: Yes     Types: Marijuana    Sexual activity: Yes     Partners: Female   Other Topics Concern    Not on file   Social History Narrative    Not on file     Social Determinants of Health     Financial Resource Strain: Not on file   Food Insecurity: Not on file   Transportation Needs: Not on file   Physical Activity: Not on file   Stress: Not on file   Social Connections: Not on file   Interpersonal Safety: Not on file   Housing Stability: Not on file            Family History:     Family History   Problem Relation Age of Onset    Hypertension Mother     Diabetes Mother     Diabetes Father     Diabetes Sister              Immunizations:     Immunization History   Administered Date(s) Administered    COVID-19 MONOVALENT 12+ (Pfizer) 04/20/2021, 05/15/2021    Pneumococcal 20 valent Conjugate (Prevnar 20) 01/09/2023    TDAP Vaccine (Boostrix) 05/01/2016    Td (Adult), Adsorbed 04/16/2002             Allergies:   No Known Allergies          Medications:   albuterol (ACCUNEB) 1.25 MG/3ML neb solution, Take 1 vial (1.25 mg) by nebulization every 6 hours as needed for shortness of breath or wheezing  albuterol (PROAIR HFA/PROVENTIL HFA/VENTOLIN HFA) 108 (90 Base) MCG/ACT inhaler, Inhale 2 puffs into the lungs every 6 hours  budesonide-formoterol (SYMBICORT) 160-4.5 MCG/ACT Inhaler, Inhale 2 puffs into the  lungs 2 times daily  buPROPion (WELLBUTRIN XL) 300 MG 24 hr tablet,   citalopram (CELEXA) 40 MG tablet, Take 1 tablet (40 mg) by mouth daily (Patient not taking: Reported on 8/31/2023)  gabapentin (NEURONTIN) 300 MG capsule, Take 1 capsule (300 mg) by mouth 4 times daily for 30 days TAKE 1 CAPSULE BY MOUTH FOUR TIMES DAILY  rosuvastatin (CRESTOR) 40 MG tablet, Take 0.5 tablets (20 mg) by mouth daily  Semaglutide-Weight Management (WEGOVY) 0.25 MG/0.5ML pen, Inject 0.25 mg Subcutaneous once a week (Patient not taking: Reported on 8/31/2023)  Semaglutide-Weight Management (WEGOVY) 0.5 MG/0.5ML pen, Inject 0.5 mg Subcutaneous once a week (Patient not taking: Reported on 8/31/2023)  tiZANidine (ZANAFLEX) 4 MG tablet, Take 1 tablet (4 mg) by mouth 3 times daily as needed for muscle spasms  traZODone (DESYREL) 50 MG tablet, Take 6 tablets (300 mg) by mouth At Bedtime  triamterene-HCTZ (MAXZIDE) 75-50 MG tablet, Take 1 tablet by mouth daily  zolpidem (AMBIEN) 10 MG tablet, Take 1 tablet (10 mg) by mouth nightly as needed for sleep    No current facility-administered medications on file prior to visit.           Review of Systems:     Review of Systems   Constitutional:  Negative for chills, fever, malaise/fatigue and weight loss.   HENT:  Negative for ear discharge, ear pain, hearing loss, nosebleeds and tinnitus.    Eyes:  Negative for blurred vision, double vision and photophobia.   Respiratory:  Positive for cough and wheezing.    Cardiovascular:  Negative for chest pain, palpitations and orthopnea.   Gastrointestinal:  Positive for heartburn. Negative for abdominal pain and diarrhea.   Genitourinary:  Negative for dysuria, frequency and urgency.   Musculoskeletal:  Positive for back pain. Negative for myalgias and neck pain.   Skin:  Negative for itching and rash.   Neurological:  Positive for dizziness. Negative for tremors, focal weakness and headaches.   Endo/Heme/Allergies:  Negative for polydipsia. Does not  "bruise/bleed easily.   Psychiatric/Behavioral:  Negative for depression and suicidal ideas.               Exam:   BP (!) 143/89   Pulse 90   Resp 17   Ht 1.761 m (5' 9.33\")   Wt 112 kg (247 lb)   SpO2 94%   BMI 36.13 kg/m      Physical Exam  Constitutional:       Appearance: Normal appearance. He is obese.   HENT:      Head: Normocephalic and atraumatic.      Nose: Nose normal.   Eyes:      Pupils: Pupils are equal, round, and reactive to light.   Cardiovascular:      Rate and Rhythm: Normal rate and regular rhythm.   Pulmonary:      Effort: Pulmonary effort is normal.      Breath sounds: Normal breath sounds.   Abdominal:      General: Bowel sounds are normal.      Palpations: Abdomen is soft.   Musculoskeletal:         General: Normal range of motion.   Skin:     General: Skin is warm and dry.   Neurological:      General: No focal deficit present.      Mental Status: He is alert.   Psychiatric:         Mood and Affect: Mood normal.         Behavior: Behavior normal.       Fingernails without clubbing         Data:     PFT on 7/28/2017        PFT on 11/20/2023 -           All studies listed here were independently reviewed and interpreted by me today.          Assessment and Plan:     58/M with PMH of HTN, HL presents to the pulmonary clinic today for COPD/asthma overlap syndrome.    1.  COPD/asthma overlap syndrome.  2.  Pulmonary nodules  3.  GERD    - Patient has progression of asthma on his current inhaler therapy.  We will step it up to triple inhaler therapy to Trelegy.  Patient counseled on stopping Symbicort and to use Trelegy daily.  - Commended him on smoking cessation.  Encouraged to continue that as well as try to stop marijuana smoking.  He is currently working on it.  - Check a 6-minute walk test.  He could not do it today as he has to go back to work, but will do it before the next visit.  - Does not have frequent exacerbations currently is not a candidate for azithromycin or PDE 4 " inhibitor.  - Pulmonary rehab referral.  - CT chest low-dose noncontrast to follow-up on his pulmonary nodules as he recently quit 3 weeks ago.  That would be in February 2024.  - Continue follow-up with sleep medicine for his AKI.  - Patient is not interested in vaccinations including COVID-19, influenza or RSV.      Return to clinic: 3 months    I personally spent 60 minutes on the date of the encounter doing chart review, history and exam, documentation and further activities per the note.         MARKO Good   of Medicine  [facility]  Pulmonary, Allergy, Critical Care and Sleep Medicine  Pager - [phone]     The above note was dictated using voice recognition software and may include typographical errors. Please contact the author for any clarifications.      Again, thank you for allowing me to participate in the care of your patient.        Sincerely,        Douglas Shipman MD

## 2023-11-22 NOTE — TELEPHONE ENCOUNTER
Prior Authorization Retail Medication Request    Medication/Dose: Trelegy Ellipta 200-62.5-25 MCG INH  Diagnosis and ICD code (if different than what is on RX):    New/renewal/insurance change PA/secondary ins. PA:  Previously Tried and Failed:    Rationale:      Insurance   Primary:   Insurance ID:      Secondary (if applicable):  Insurance ID:      Pharmacy Information (if different than what is on RX)  Name:    Phone:    Fax

## 2023-11-28 NOTE — TELEPHONE ENCOUNTER
PA Initiation    Medication: TRELEGY ELLIPTA 200-62.5-25 MCG/ACT IN AEPB  Insurance Company: [employer] - Phone [phone] Fax [phone]  Pharmacy Filling the Rx: [facility] #[id number] - [place], [place] - [facility] [address] AT [facility] [address]  Filling Pharmacy Phone: [phone]  Filling Pharmacy Fax:    Start Date: 11/28/2023

## 2023-11-28 NOTE — TELEPHONE ENCOUNTER
PRIOR AUTHORIZATION DENIED    Medication: TRELEGY ELLIPTA 200-62.5-25 MCG/ACT IN AEPB  Insurance Company: [employer] - Phone [phone] Fax [phone]  Denial Date: 11/28/2023  Denial Rational: Needs to try both formulary alternatives- Advair Diskus or Dulera  Appeal Information: N/A  Patient Notified: No

## 2023-11-29 NOTE — TELEPHONE ENCOUNTER
Trelegy denied.  Rx Advair placed per Dr Shipman.  Left message on unidentified VM asking for Babar to return call to clinic to discuss.

## 2023-12-05 NOTE — TELEPHONE ENCOUNTER
Patient called back.  States he is only taking the Advair.  Does not need to have Symbicort refilled.  Luh PEREZ RN

## 2023-12-05 NOTE — TELEPHONE ENCOUNTER
Left message for patient to call [facility] back  When patient calls back please transfer to an RN  Luh PEREZ RN

## 2023-12-05 NOTE — TELEPHONE ENCOUNTER
Rx Advair on 11/29/2023 by Dr Shipman.  Which inhaler is the patient taking Symbicort or Advair?     MD Odin

## 2023-12-06 NOTE — TELEPHONE ENCOUNTER
Prior Authorization Retail Medication Request    Medication/Dose: Wixela 500-50MCG INH  Diagnosis and ICD code (if different than what is on RX):    New/renewal/insurance change PA/secondary ins. PA:  Previously Tried and Failed:    Rationale:      Insurance   Primary:   Insurance ID:      Secondary (if applicable):  Insurance ID:      Pharmacy Information (if different than what is on RX)  Name:    Phone:    Fax:

## 2023-12-07 NOTE — TELEPHONE ENCOUNTER
Left Voicemail (1st Attempt) for the patient to call back and schedule the following:    Appointment type: 6MWT  Provider: KELLEY  Return date: 02/20/24  Specialty phone number: [phone]  Additional appointment(s) needed: PFT  Additonal Notes: NA

## 2023-12-07 NOTE — TELEPHONE ENCOUNTER
Left 2nd message for pt to return call to clinic.     Confirmed with pt that he did  Advair from the pharmacy.

## 2023-12-10 NOTE — TELEPHONE ENCOUNTER
Central Prior Authorization Team   Phone: [phone]    PA Initiation    Medication: Wixela 500-50MCG INH  Insurance Company: [employer] - Phone [phone] Fax [phone]  Pharmacy Filling the Rx: [id number] [place], [place] - [address] AT [facility] [address]  Filling Pharmacy Phone: [phone]  Filling Pharmacy Fax:    Start Date: 12/10/2023

## 2023-12-10 NOTE — TELEPHONE ENCOUNTER
Central Prior Authorization Team   Phone: [phone]    PA Initiation    Medication: Wixela 500-50MCG INH  Insurance Company: [employer] - Phone [phone] Fax [phone]  Pharmacy Filling the Rx: [email or web address] #[id number] [place], [place] - [address] AT [facility] [address]  Filling Pharmacy Phone: [phone]  Filling Pharmacy Fax:    Start Date: 12/10/2023

## 2023-12-11 NOTE — TELEPHONE ENCOUNTER
Left Voicemail (2nd Attempt) for the patient to call back and schedule the following:    Appointment type: 6MWT  Provider: KELLEY  Return date: 02/20/24  Specialty phone number: [phone]  Additional appointment(s) needed: NA  Additonal Notes: NA

## 2023-12-14 NOTE — TELEPHONE ENCOUNTER
Prior Authorization Not Needed per Insurance    Medication: Wixela 500-50MCG INH-PA NOT NEEDED   Insurance Company: [employer] - Phone [phone] Fax [phone]  Expected CoPay:      Pharmacy Filling the Rx: [facility] #[id number] - [place], [place] - [address]  Pharmacy Notified:  Yes  Patient Notified:  No    Called pharmacy and pharmacy stated that PA is Not Needed and Brand Advair Diskus is covered. **Instructed pharmacy to notify patient when script is ready to /ship.** Pharmacy stated that they have a paid claim on medication on 12/1/2023 and patient has picked up medication.

## 2023-12-29 NOTE — TELEPHONE ENCOUNTER
Left Voicemail (1st Attempt) for the patient to call back and schedule the following:    Appointment type: tanvir  Provider: claudy  Return date: 02/02/24  Specialty phone number: [phone]  Additional appointment(s) needed: n/a   Additonal Notes: n/a

## 2024-01-01 ENCOUNTER — TELEPHONE (OUTPATIENT)
Dept: PULMONOLOGY | Facility: CLINIC | Age: 59
End: 2024-01-01
Payer: COMMERCIAL

## 2024-01-01 ENCOUNTER — TELEPHONE (OUTPATIENT)
Dept: FAMILY MEDICINE | Facility: CLINIC | Age: 59
End: 2024-01-01
Payer: COMMERCIAL

## 2024-01-01 ENCOUNTER — PATIENT OUTREACH (OUTPATIENT)
Dept: CARE COORDINATION | Facility: CLINIC | Age: 59
End: 2024-01-01
Payer: COMMERCIAL

## 2024-01-01 DIAGNOSIS — M54.6 CHRONIC BILATERAL THORACIC BACK PAIN: ICD-10-CM

## 2024-01-01 DIAGNOSIS — G89.29 CHRONIC BILATERAL THORACIC BACK PAIN: ICD-10-CM

## 2024-01-01 DIAGNOSIS — G47.00 INSOMNIA, UNSPECIFIED TYPE: ICD-10-CM

## 2024-01-01 DIAGNOSIS — J44.9 CHRONIC OBSTRUCTIVE PULMONARY DISEASE, UNSPECIFIED COPD TYPE (H): ICD-10-CM

## 2024-01-01 DIAGNOSIS — K21.9 GASTROESOPHAGEAL REFLUX DISEASE, UNSPECIFIED WHETHER ESOPHAGITIS PRESENT: ICD-10-CM

## 2024-01-01 RX ORDER — ZOLPIDEM TARTRATE 10 MG/1
10 TABLET ORAL
Qty: 30 TABLET | Refills: 5 | OUTPATIENT
Start: 2024-01-01

## 2024-01-01 RX ORDER — ALBUTEROL SULFATE 90 UG/1
2 AEROSOL, METERED RESPIRATORY (INHALATION) EVERY 6 HOURS
Qty: 6.7 G | Refills: 1 | Status: SHIPPED | OUTPATIENT
Start: 2024-01-01

## 2024-01-01 RX ORDER — TRAZODONE HYDROCHLORIDE 50 MG/1
300 TABLET, FILM COATED ORAL AT BEDTIME
Qty: 180 TABLET | Refills: 2 | Status: SHIPPED | OUTPATIENT
Start: 2024-01-01

## 2024-01-01 RX ORDER — MONTELUKAST SODIUM 10 MG/1
10 TABLET ORAL EVERY EVENING
Qty: 30 TABLET | Refills: 1 | Status: SHIPPED | OUTPATIENT
Start: 2024-01-01

## 2024-01-01 RX ORDER — PANTOPRAZOLE SODIUM 40 MG/1
40 TABLET, DELAYED RELEASE ORAL DAILY
Qty: 30 TABLET | Refills: 1 | Status: SHIPPED | OUTPATIENT
Start: 2024-01-01

## 2024-01-03 NOTE — TELEPHONE ENCOUNTER
Left Voicemail (2nd Attempt) for the patient to call back and schedule the following:    Appointment type: CATHY  Provider: KELLEY  Return date: 02/20/24  Specialty phone number: [phone]  Additional appointment(s) needed: NA  Additonal Notes: NA

## 2024-01-19 NOTE — TELEPHONE ENCOUNTER
Ambien is controlled substance and we will not be able to refill it without a visit. Can we do a video visit to refill it?  Last appointment was on 08/2023  MD Odin

## 2024-01-19 NOTE — TELEPHONE ENCOUNTER
Pending Prescriptions:                       Disp   Refills    zolpidem (AMBIEN) 10 MG tablet            30 tab*5            Sig: Take 1 tablet (10 mg) by mouth nightly as needed           for sleep    traZODone (DESYREL) 50 MG tablet          180 ta*2            Sig: Take 6 tablets (300 mg) by mouth at bedtime    tiZANidine (ZANAFLEX) 4 MG tablet         90 tab*3            Sig: Take 1 tablet (4 mg) by mouth 3 times daily as           needed for muscle spasms      Patient is asking for a 90 day refill as his insurance will be going out soon and he is in the process of getting it going again.

## 2024-01-26 NOTE — TELEPHONE ENCOUNTER
Patient called.   Informed him Rxs were sent to pharmacy.     Additionally, offered to schedule appt for ambien management but patient politely declined as his insurance will run out at the end of the month.   Patient will keep us updated when he receives his new insurance.     Renetta RIVERA

## (undated) DEVICE — PREP CHLORAPREP W/ORANGE TINT 10.5ML 260715

## (undated) DEVICE — GLOVE PROTEXIS POWDER FREE SMT 7.0  2D72PT70X

## (undated) DEVICE — TRAY PAIN INJECTION 97A 640

## (undated) DEVICE — NDL SPINAL 22GA 5" QUINCKE 405148

## (undated) DEVICE — NDL SPINAL 22GA 3.5" QUINCKE 405181

## (undated) DEVICE — NDL SPINAL 25GA 3.5" QUINCKE 405180

## (undated) DEVICE — SYR 10ML LL W/O NDL

## (undated) DEVICE — NDL EPIDURAL TUOHY 20GA 3.5" 405028

## (undated) DEVICE — SYR 07ML EPIDURAL LOSS OF RESISTANCE PULSATOR 4905

## (undated) DEVICE — ESU CANNULA RF MONOPLOAR CVD 20GA 10X150MM 0406-630-225

## (undated) RX ORDER — ONDANSETRON 2 MG/ML
INJECTION INTRAMUSCULAR; INTRAVENOUS
Status: DISPENSED
Start: 2022-10-20

## (undated) RX ORDER — DIAZEPAM 5 MG
TABLET ORAL
Status: DISPENSED
Start: 2022-05-26

## (undated) RX ORDER — FENTANYL CITRATE 50 UG/ML
INJECTION, SOLUTION INTRAMUSCULAR; INTRAVENOUS
Status: DISPENSED
Start: 2022-10-20

## (undated) RX ORDER — DIAZEPAM 5 MG
TABLET ORAL
Status: DISPENSED
Start: 2022-08-11

## (undated) RX ORDER — DIAZEPAM 5 MG
TABLET ORAL
Status: DISPENSED
Start: 2022-07-14